# Patient Record
Sex: FEMALE | Race: WHITE | Employment: UNEMPLOYED | URBAN - METROPOLITAN AREA
[De-identification: names, ages, dates, MRNs, and addresses within clinical notes are randomized per-mention and may not be internally consistent; named-entity substitution may affect disease eponyms.]

---

## 2017-01-03 ENCOUNTER — RADIANT APPOINTMENT (OUTPATIENT)
Dept: MAMMOGRAPHY | Facility: CLINIC | Age: 57
End: 2017-01-03
Payer: COMMERCIAL

## 2017-01-03 DIAGNOSIS — Z12.31 VISIT FOR SCREENING MAMMOGRAM: ICD-10-CM

## 2017-01-03 PROCEDURE — G0202 SCR MAMMO BI INCL CAD: HCPCS | Mod: TC

## 2017-01-05 ENCOUNTER — OFFICE VISIT (OUTPATIENT)
Dept: OPTOMETRY | Facility: CLINIC | Age: 57
End: 2017-01-05
Payer: COMMERCIAL

## 2017-01-05 DIAGNOSIS — E11.9 TYPE 2 DIABETES MELLITUS WITHOUT RETINOPATHY (H): ICD-10-CM

## 2017-01-05 DIAGNOSIS — H52.203 HYPEROPIA WITH ASTIGMATISM AND PRESBYOPIA, BILATERAL: Primary | ICD-10-CM

## 2017-01-05 DIAGNOSIS — H52.03 HYPEROPIA WITH ASTIGMATISM AND PRESBYOPIA, BILATERAL: Primary | ICD-10-CM

## 2017-01-05 DIAGNOSIS — H52.4 HYPEROPIA WITH ASTIGMATISM AND PRESBYOPIA, BILATERAL: Primary | ICD-10-CM

## 2017-01-05 PROCEDURE — 92015 DETERMINE REFRACTIVE STATE: CPT | Performed by: OPTOMETRIST

## 2017-01-05 PROCEDURE — 92014 COMPRE OPH EXAM EST PT 1/>: CPT | Performed by: OPTOMETRIST

## 2017-01-05 ASSESSMENT — REFRACTION_WEARINGRX
OS_CYLINDER: +0.75
OS_ADD: +1.75
OD_ADD: +1.75
OS_AXIS: 118
SPECS_TYPE: BIFOCAL
OS_SPHERE: -0.50
OD_CYLINDER: +0.50
OD_SPHERE: PLANO
OD_AXIS: 035

## 2017-01-05 ASSESSMENT — VISUAL ACUITY
METHOD: SNELLEN - LINEAR
OD_SC+: +2
OS_CC: 20/30-3
OS_SC+: -2
OS_CC+: +1
OD_SC: 20/30
OS_CC: 20/25
OD_CC: 20/20
OD_CC: 20/30-2
OD_SC: 20/60
OS_SC: 20/120+1
OS_SC: 20/20
OD_CC+: -1

## 2017-01-05 ASSESSMENT — EXTERNAL EXAM - LEFT EYE: OS_EXAM: NORMAL

## 2017-01-05 ASSESSMENT — REFRACTION_MANIFEST
OS_CYLINDER: +1.00
OD_ADD: +2.00
OD_CYLINDER: +0.75
OS_SPHERE: -1.00
OS_CYLINDER: +1.50
OS_AXIS: 108
OD_SPHERE: -0.25
OD_AXIS: 064
OS_AXIS: 120
OD_SPHERE: PLANO
OS_SPHERE: -0.25
OD_CYLINDER: +1.00
OS_ADD: +2.00
METHOD_AUTOREFRACTION: 1
OD_AXIS: 60

## 2017-01-05 ASSESSMENT — TONOMETRY
IOP_METHOD: APPLANATION
OD_IOP_MMHG: 16
OS_IOP_MMHG: 16

## 2017-01-05 ASSESSMENT — SLIT LAMP EXAM - LIDS
COMMENTS: NORMAL
COMMENTS: NORMAL

## 2017-01-05 ASSESSMENT — CONF VISUAL FIELD
OS_NORMAL: 1
METHOD: COUNTING FINGERS
OD_NORMAL: 1

## 2017-01-05 ASSESSMENT — EXTERNAL EXAM - RIGHT EYE: OD_EXAM: NORMAL

## 2017-01-05 ASSESSMENT — CUP TO DISC RATIO
OD_RATIO: 0.2
OS_RATIO: 0.2

## 2017-01-05 NOTE — PROGRESS NOTES
Chief Complaint   Patient presents with     Eye Exam For Diabetes        A1C      6.3   12/21/2016  A1C      6.7   9/15/2016  A1C      7.5   6/9/2016  A1C      6.3   7/13/2015  A1C      6.1   4/22/2015    Last Eye Exam: 12/8/2015  Dilated Previously: Yes    What are you currently using to see?  glasses    Distance Vision Acuity: Satisfied with vision    Near Vision Acuity: Satisfied with vision while reading  with glasses    Eye Comfort: itchy - sometimes  Do you use eye drops? : Yes: Systane - 2x a month  Occupation or Hobbies: games on phone - [employer]    Kusum Hintonquist  [employer]     Medical, surgical and family histories reviewed and updated 1/5/2017.       OBJECTIVE: See Ophthalmology exam    ASSESSMENT:    ICD-10-CM    1. Hyperopia with astigmatism and presbyopia, bilateral H52.03 EYE EXAM (SIMPLE-NONBILLABLE)    H52.203 REFRACTION   2. Type 2 diabetes mellitus without retinopathy (H) E11.9 EYE EXAM (SIMPLE-NONBILLABLE)      PLAN:    Pily Roche aware  eye exam results will be sent to Kath Fierro.  Patient Instructions   There was no diabetic eye disease in either eye today. Keep blood sugar levels under good control and return yearly for eye exams.    Your eyes may be blurry at near and sensitive to light for several hours from the dilating drops.

## 2017-01-05 NOTE — PATIENT INSTRUCTIONS
There was no diabetic eye disease in either eye today. Keep blood sugar levels under good control and return yearly for eye exams.    Your eyes may be blurry at near and sensitive to light for several hours from the dilating drops.

## 2017-01-10 RX ORDER — METFORMIN HCL 500 MG
TABLET, EXTENDED RELEASE 24 HR ORAL
Qty: 360 TABLET | Refills: 0 | OUTPATIENT
Start: 2017-01-10

## 2017-01-10 NOTE — TELEPHONE ENCOUNTER
metFORMIN (GLUCOPHAGE-XR) 500 MG 24 hr tablet 360 tablet 1 12/21/2016  No      Sig: Take 4 tablets (2,000 mg) by mouth daily (with dinner)     RN called pharmacy and was informed patient has refills on file.    Martita Quintana RN

## 2017-01-30 RX ORDER — DICLOFENAC SODIUM 75 MG/1
TABLET, DELAYED RELEASE ORAL
Qty: 180 TABLET | Refills: 0 | OUTPATIENT
Start: 2017-01-30

## 2017-01-30 RX ORDER — SIMVASTATIN 40 MG
TABLET ORAL
Qty: 90 TABLET | Refills: 0 | OUTPATIENT
Start: 2017-01-30

## 2017-01-30 NOTE — TELEPHONE ENCOUNTER
Denied as too soon - patient should have refills available.  Last RX in EPIC shows it was sent to the same pharmacy (as requesting) with 'Receipt confirmed by pharmacy'.      Reza Kearney RN

## 2017-02-04 ENCOUNTER — TELEPHONE (OUTPATIENT)
Dept: FAMILY MEDICINE | Facility: CLINIC | Age: 57
End: 2017-02-04

## 2017-02-04 DIAGNOSIS — E11.9 TYPE 2 DIABETES MELLITUS WITHOUT RETINOPATHY (H): Primary | ICD-10-CM

## 2017-02-04 NOTE — TELEPHONE ENCOUNTER
The patient's insurance requires a new prescription. [employer] no longer covers Accu-chek Edna. Please fax us back for a new prescription approval for new diabetic suplies (nw meter, strips and lancets). Insurance covers One Touch Ultra.          Jordan Faarax  [facility]

## 2017-04-11 ENCOUNTER — TELEPHONE (OUTPATIENT)
Dept: FAMILY MEDICINE | Facility: CLINIC | Age: 57
End: 2017-04-11

## 2017-04-11 DIAGNOSIS — G89.29 CHRONIC SHOULDER PAIN, UNSPECIFIED LATERALITY: ICD-10-CM

## 2017-04-11 DIAGNOSIS — F33.1 MAJOR DEPRESSIVE DISORDER, RECURRENT EPISODE, MODERATE (H): ICD-10-CM

## 2017-04-11 DIAGNOSIS — F17.200 TOBACCO USE DISORDER: ICD-10-CM

## 2017-04-11 DIAGNOSIS — M25.519 CHRONIC SHOULDER PAIN, UNSPECIFIED LATERALITY: ICD-10-CM

## 2017-04-11 NOTE — LETTER
April 21, 2017          Pily Roche  [address] [facility] [address]            Dear Pily Roche,      At [facility] we care about your health and are committed to providing quality patient care. Regular appointments are a vital part of the care and management of your health and can help prevent many of the complications that can occur.      It has come to our attention that you are due for Diabetes check.  Please call [facility] at [phone] soon to schedule your follow up appointment.    If you have transferred care to another clinic please call to inform us so that we do not continue to send you reminder letters.      Sincerely,      [facility] Care Team/kwaku

## 2017-04-11 NOTE — TELEPHONE ENCOUNTER
Panel Management Review      Lab Results   Component Value Date    A1C 6.3 12/21/2016    A1C 6.7 09/15/2016       Fail List measure: Dm      Patient is due/failing the following:   A1C    Action needed:   Patient needs office visit for Diabetes check.    Type of outreach:    Phone, spoke to patient.   , left message to call and schd a Diabetes appt.      Questions for provider review:    None                                                                                                                                    Kaela Wolf MA

## 2017-04-11 NOTE — TELEPHONE ENCOUNTER
buPROPion (WELLBUTRIN SR) 150 MG 12 hr tablet       Last Written Prescription Date: 12/21/16  Last Fill Quantity: 180; # refills: 1  Last Office Visit with [facility], [facility] or  Health prescribing provider:  12/21/16        Last PHQ-9 score on record=   PHQ-9 SCORE 11/28/2016   Total Score -   Total Score 12       Lab Results   Component Value Date    AST 28 12/21/2016     Lab Results   Component Value Date    ALT 47 12/21/2016       gabapentin (NEURONTIN) 300 MG capsule      Last Written Prescription Date: 12/21/16  Last Quantity: 910, # refills: 1  Last Office Visit with [facility], [facility] or  Health prescribing provider: 12/21/16       Creatinine   Date Value Ref Range Status   12/21/2016 0.78 0.52 - 1.04 mg/dL Final     Lab Results   Component Value Date    AST 28 12/21/2016     Lab Results   Component Value Date    ALT 47 12/21/2016     BP Readings from Last 3 Encounters:   12/21/16 116/78   09/19/16 112/60   06/13/16 110/80     traZODone (DESYREL) 100 MG tablet       Last Written Prescription Date: 12/21/16  Last Fill Quantity: 90; # refills: 1  Last Office Visit with [facility], [facility] or  Health prescribing provider:  12/21/16        Last PHQ-9 score on record=   PHQ-9 SCORE 11/28/2016   Total Score -   Total Score 12       Lab Results   Component Value Date    AST 28 12/21/2016     Lab Results   Component Value Date    ALT 47 12/21/2016           Jordan Faarax  [facility]

## 2017-04-12 NOTE — TELEPHONE ENCOUNTER
Routing refill request to provider for review/approval because:  Drug not on the [facility] refill protocol - Gabapentin  PHQ9 > 4

## 2017-04-13 RX ORDER — GABAPENTIN 300 MG/1
CAPSULE ORAL
Qty: 810 CAPSULE | Refills: 0 | Status: SHIPPED | OUTPATIENT
Start: 2017-04-13 | End: 2017-07-07

## 2017-04-13 RX ORDER — BUPROPION HYDROCHLORIDE 150 MG/1
TABLET, EXTENDED RELEASE ORAL
Qty: 180 TABLET | Refills: 0 | Status: SHIPPED | OUTPATIENT
Start: 2017-04-13 | End: 2017-07-07

## 2017-04-13 RX ORDER — TRAZODONE HYDROCHLORIDE 100 MG/1
TABLET ORAL
Qty: 180 TABLET | Refills: 0 | Status: SHIPPED | OUTPATIENT
Start: 2017-04-13 | End: 2017-07-07

## 2017-06-18 DIAGNOSIS — M25.519 CHRONIC SHOULDER PAIN, UNSPECIFIED LATERALITY: ICD-10-CM

## 2017-06-18 DIAGNOSIS — G89.29 CHRONIC SHOULDER PAIN, UNSPECIFIED LATERALITY: ICD-10-CM

## 2017-06-18 NOTE — TELEPHONE ENCOUNTER
lidocaine (LIDODERM) 5 % Patch      Last Written Prescription Date: 12/21/16  Last Fill Quantity: 90, # refills: 1  Last Office Visit with [facility] or [facility] prescribing provider: 12/21/16       Creatinine   Date Value Ref Range Status   12/21/2016 0.78 0.52 - 1.04 mg/dL Final         Genevieve Goldberg  [facility]

## 2017-06-20 NOTE — TELEPHONE ENCOUNTER
Routing refill request to provider for review/approval because:  Drug not on the [facility] refill protocol     KATE Cramer, Clinical RN Larissa Young.

## 2017-06-21 DIAGNOSIS — F41.9 ANXIETY: Primary | ICD-10-CM

## 2017-06-21 RX ORDER — CLONAZEPAM 1 MG/1
TABLET ORAL
Refills: 0 | Status: SHIPPED
Start: 2017-06-21 | End: 2017-07-07

## 2017-06-21 RX ORDER — LIDOCAINE 50 MG/G
PATCH TOPICAL
Qty: 90 PATCH | Refills: 1 | Status: SHIPPED | OUTPATIENT
Start: 2017-06-21 | End: 2018-07-18

## 2017-06-21 NOTE — TELEPHONE ENCOUNTER
clonazePAM (KLONOPIN) 1 MG tablet (Discontinued)      Last Written Prescription Date:  12/21/16  Last Fill Quantity: 60,   # refills: 2  Last Office Visit with [facility], [facility] or [facility] prescribing provider: 12/21/16  Future Office visit:       Routing refill request to provider for review/approval because:  Drug not on the [facility], [facility] or [facility] refill protocol or controlled substance          Jordan Faarax  [facility]

## 2017-06-21 NOTE — TELEPHONE ENCOUNTER
I will not fill controlled substances for her as we discussed after her last urine drug screen.  She is due for a diabetes recheck, I suggest she schedule an appt soon for that.   Kath Fierro PA-C

## 2017-07-05 DIAGNOSIS — N18.2 TYPE 2 DIABETES MELLITUS WITH STAGE 2 CHRONIC KIDNEY DISEASE, WITHOUT LONG-TERM CURRENT USE OF INSULIN (H): Primary | ICD-10-CM

## 2017-07-05 DIAGNOSIS — E11.22 TYPE 2 DIABETES MELLITUS WITH STAGE 2 CHRONIC KIDNEY DISEASE, WITHOUT LONG-TERM CURRENT USE OF INSULIN (H): Primary | ICD-10-CM

## 2017-07-07 ENCOUNTER — OFFICE VISIT (OUTPATIENT)
Dept: FAMILY MEDICINE | Facility: CLINIC | Age: 57
End: 2017-07-07
Payer: COMMERCIAL

## 2017-07-07 VITALS
TEMPERATURE: 97.8 F | SYSTOLIC BLOOD PRESSURE: 120 MMHG | HEART RATE: 102 BPM | OXYGEN SATURATION: 94 % | HEIGHT: 64 IN | BODY MASS INDEX: 30.22 KG/M2 | DIASTOLIC BLOOD PRESSURE: 76 MMHG | WEIGHT: 177 LBS

## 2017-07-07 DIAGNOSIS — F17.200 TOBACCO USE DISORDER: ICD-10-CM

## 2017-07-07 DIAGNOSIS — F33.1 MAJOR DEPRESSIVE DISORDER, RECURRENT EPISODE, MODERATE (H): ICD-10-CM

## 2017-07-07 DIAGNOSIS — K21.9 LPRD (LARYNGOPHARYNGEAL REFLUX DISEASE): ICD-10-CM

## 2017-07-07 DIAGNOSIS — E78.5 HYPERLIPIDEMIA LDL GOAL <100: ICD-10-CM

## 2017-07-07 DIAGNOSIS — J44.89 CHRONIC BRONCHITIS WITH COPD (CHRONIC OBSTRUCTIVE PULMONARY DISEASE) (H): ICD-10-CM

## 2017-07-07 DIAGNOSIS — M25.519 CHRONIC SHOULDER PAIN, UNSPECIFIED LATERALITY: ICD-10-CM

## 2017-07-07 DIAGNOSIS — N18.2 TYPE 2 DIABETES MELLITUS WITH STAGE 2 CHRONIC KIDNEY DISEASE, WITHOUT LONG-TERM CURRENT USE OF INSULIN (H): ICD-10-CM

## 2017-07-07 DIAGNOSIS — G89.29 CHRONIC SHOULDER PAIN, UNSPECIFIED LATERALITY: ICD-10-CM

## 2017-07-07 DIAGNOSIS — E11.22 TYPE 2 DIABETES MELLITUS WITH STAGE 2 CHRONIC KIDNEY DISEASE, WITHOUT LONG-TERM CURRENT USE OF INSULIN (H): ICD-10-CM

## 2017-07-07 LAB
ALBUMIN SERPL-MCNC: 4.2 G/DL (ref 3.4–5)
ALP SERPL-CCNC: 116 U/L (ref 40–150)
ALT SERPL W P-5'-P-CCNC: 38 U/L (ref 0–50)
ANION GAP SERPL CALCULATED.3IONS-SCNC: 8 MMOL/L (ref 3–14)
AST SERPL W P-5'-P-CCNC: 23 U/L (ref 0–45)
BILIRUB SERPL-MCNC: 0.4 MG/DL (ref 0.2–1.3)
BUN SERPL-MCNC: 19 MG/DL (ref 7–30)
CALCIUM SERPL-MCNC: 9.4 MG/DL (ref 8.5–10.1)
CHLORIDE SERPL-SCNC: 107 MMOL/L (ref 94–109)
CHOLEST SERPL-MCNC: 162 MG/DL
CO2 SERPL-SCNC: 26 MMOL/L (ref 20–32)
CREAT SERPL-MCNC: 0.77 MG/DL (ref 0.52–1.04)
CREAT UR-MCNC: 105 MG/DL
GFR SERPL CREATININE-BSD FRML MDRD: 78 ML/MIN/1.7M2
GLUCOSE SERPL-MCNC: 105 MG/DL (ref 70–99)
HBA1C MFR BLD: 6.3 % (ref 4.3–6)
HDLC SERPL-MCNC: 50 MG/DL
LDLC SERPL CALC-MCNC: 91 MG/DL
MICROALBUMIN UR-MCNC: 12 MG/L
MICROALBUMIN/CREAT UR: 11.24 MG/G CR (ref 0–25)
NONHDLC SERPL-MCNC: 112 MG/DL
POTASSIUM SERPL-SCNC: 4.1 MMOL/L (ref 3.4–5.3)
PROT SERPL-MCNC: 7.5 G/DL (ref 6.8–8.8)
SODIUM SERPL-SCNC: 141 MMOL/L (ref 133–144)
TRIGL SERPL-MCNC: 106 MG/DL

## 2017-07-07 PROCEDURE — 82043 UR ALBUMIN QUANTITATIVE: CPT | Performed by: PHYSICIAN ASSISTANT

## 2017-07-07 PROCEDURE — 80053 COMPREHEN METABOLIC PANEL: CPT | Performed by: PHYSICIAN ASSISTANT

## 2017-07-07 PROCEDURE — 36415 COLL VENOUS BLD VENIPUNCTURE: CPT | Performed by: PHYSICIAN ASSISTANT

## 2017-07-07 PROCEDURE — 99214 OFFICE O/P EST MOD 30 MIN: CPT | Performed by: PHYSICIAN ASSISTANT

## 2017-07-07 PROCEDURE — 80061 LIPID PANEL: CPT | Performed by: PHYSICIAN ASSISTANT

## 2017-07-07 PROCEDURE — 99207 C FOOT EXAM  NO CHARGE: CPT | Performed by: PHYSICIAN ASSISTANT

## 2017-07-07 PROCEDURE — 83036 HEMOGLOBIN GLYCOSYLATED A1C: CPT | Performed by: PHYSICIAN ASSISTANT

## 2017-07-07 RX ORDER — METFORMIN HCL 500 MG
2000 TABLET, EXTENDED RELEASE 24 HR ORAL
Qty: 360 TABLET | Refills: 1 | Status: SHIPPED | OUTPATIENT
Start: 2017-07-07 | End: 2017-12-04

## 2017-07-07 RX ORDER — TRAZODONE HYDROCHLORIDE 100 MG/1
200 TABLET ORAL AT BEDTIME
Qty: 180 TABLET | Refills: 1 | Status: SHIPPED | OUTPATIENT
Start: 2017-07-07 | End: 2017-12-04

## 2017-07-07 RX ORDER — LORATADINE 10 MG/1
10 TABLET ORAL DAILY
Qty: 90 TABLET | Refills: 1 | Status: SHIPPED | OUTPATIENT
Start: 2017-07-07 | End: 2017-12-04

## 2017-07-07 RX ORDER — METHOCARBAMOL 500 MG/1
1000 TABLET, FILM COATED ORAL 3 TIMES DAILY PRN
Qty: 270 TABLET | Refills: 1 | Status: SHIPPED | OUTPATIENT
Start: 2017-07-07 | End: 2017-12-04

## 2017-07-07 RX ORDER — ALBUTEROL SULFATE 90 UG/1
AEROSOL, METERED RESPIRATORY (INHALATION)
Qty: 36 G | Refills: 1 | Status: SHIPPED | OUTPATIENT
Start: 2017-07-07 | End: 2017-10-25

## 2017-07-07 RX ORDER — BUPROPION HYDROCHLORIDE 150 MG/1
150 TABLET, EXTENDED RELEASE ORAL 2 TIMES DAILY
Qty: 180 TABLET | Refills: 1 | Status: SHIPPED | OUTPATIENT
Start: 2017-07-07 | End: 2017-12-04

## 2017-07-07 RX ORDER — GABAPENTIN 300 MG/1
CAPSULE ORAL
Qty: 810 CAPSULE | Refills: 1 | Status: SHIPPED | OUTPATIENT
Start: 2017-07-07 | End: 2017-12-04

## 2017-07-07 RX ORDER — SIMVASTATIN 40 MG
40 TABLET ORAL AT BEDTIME
Qty: 90 TABLET | Refills: 1 | Status: SHIPPED | OUTPATIENT
Start: 2017-07-07 | End: 2017-12-04

## 2017-07-07 RX ORDER — FLUTICASONE PROPIONATE 50 MCG
2 SPRAY, SUSPENSION (ML) NASAL DAILY
Qty: 48 G | Refills: 1 | Status: SHIPPED | OUTPATIENT
Start: 2017-07-07 | End: 2017-12-04

## 2017-07-07 RX ORDER — DICLOFENAC SODIUM 75 MG/1
TABLET, DELAYED RELEASE ORAL
Qty: 180 TABLET | Refills: 1 | Status: SHIPPED | OUTPATIENT
Start: 2017-07-07 | End: 2017-12-04

## 2017-07-07 ASSESSMENT — PAIN SCALES - GENERAL: PAINLEVEL: NO PAIN (0)

## 2017-07-07 NOTE — NURSING NOTE
"Chief Complaint   Patient presents with     Diabetes       Initial /76  Pulse 102  Temp 97.8  F (36.6  C) (Tympanic)  Ht 5' 4\" (1.626 m)  Wt 177 lb (80.3 kg)  SpO2 94%  Breastfeeding? No  BMI 30.38 kg/m2 Estimated body mass index is 30.38 kg/(m^2) as calculated from the following:    Height as of this encounter: 5' 4\" (1.626 m).    Weight as of this encounter: 177 lb (80.3 kg).  Medication Reconciliation: complete   Deandre APONTE        "

## 2017-07-07 NOTE — MR AVS SNAPSHOT
After Visit Summary   7/7/2017    Pily Roche    MRN: [id number]           Patient Information     Date Of Birth          1960        Visit Information        Provider Department      7/7/2017 7:00 AM Kath Fierro PA-C [facility]        Today's Diagnoses     Type 2 diabetes mellitus with stage 2 chronic kidney disease, without long-term current use of insulin (H)        LPRD (laryngopharyngeal reflux disease)        Chronic shoulder pain, unspecified laterality        Major depressive disorder, recurrent episode, moderate (H)        Chronic bronchitis with COPD (chronic obstructive pulmonary disease)        Hyperlipidemia LDL goal <100        Tobacco use disorder          Care Instructions    At [facility], we strive to deliver an exceptional experience to you, every time we see you.    If you receive a survey in the mail, please send us back your thoughts. We really do value your feedback.    Thank you for visiting [facility]    Normal or non-critical lab and imaging results will be communicated to you by MyChart, letter or phone within 7 days.  If you do not hear from us within 10 days, please call the clinic. If you have a critical or abnormal lab result, we will notify you by phone as soon as possible.     If you have any questions regarding your visit please contact:     Team Zeynep/Spirit  Clinic Hours Telephone Number   Dr. Daniel Varela   7am-7pm  Monday through Thursday  7am-5pm Friday [phone]  Lizzy CHAVEZ RN   Pharmacy 8:30am-9pm Monday-Friday    9am-5pm Saturday-Sunday [phone]   Urgent Care 11am-9pm Monday-Friday        9am-5pm Saturday-Sunday [phone]     After hours, weekend or if you need to make an appointment with your primary provider please call [phone].   After Hours nurse advise:  call [facility]: [phone]    Use EyeLockhart (secure email communication and access to your chart) to send your primary care provider a message or make an appointment. Ask someone on your Team how to sign up for AVST. To log on to [employer] or for more information in [employer] please visit the website at [email or web address].   As of October 8, 2013, all password changes, disabled accounts, or ID changes in AVST/MyHealth will be done by our Access Services Department.   If you need help with your account or password, call: [phone]. Clinic staff no longer has the ability to change passwords.             Follow-ups after your visit        Your next 10 appointments already scheduled     Jul 12, 2017  7:20 AM CDT   Office Visit with Kath Fierro PA-C   [facility]    [address] [place] [id number] [phone]           Bring a current list of meds and any records pertaining to this visit.  For Physicals, please bring immunization records and any forms needing to be filled out.  Please arrive 10 minutes early to complete paperwork.              Who to contact     If you have questions or need follow up information about today's clinic visit or your schedule please contact [facility] directly at [phone].  Normal or non-critical lab and imaging results will be communicated to you by EyeLockhart, letter or phone within 4 business days after the clinic has received the results. If you do not hear from us within 7 days, please contact the clinic through EyeLockhart or phone. If you have a critical or abnormal lab result, we will notify you by phone as soon as possible.  Submit refill requests through AVST or call your pharmacy and they will forward the refill request to us. Please allow 3 business days for your refill to be completed.          Additional Information About Your Visit        [facility]  "Information     Planwise lets you send messages to your doctor, view your test results, renew your prescriptions, schedule appointments and more. To sign up, go to [email or web address] . Click on \"Log in\" on the left side of the screen, which will take you to the Welcome page. Then click on \"Sign up Now\" on the right side of the page.     You will be asked to enter the access code listed below, as well as some personal information. Please follow the directions to create your username and password.     Your access code is: [email or web address]  Expires: 10/5/2017  7:28 AM     Your access code will  in 90 days. If you need help or a new code, please call your [place] clinic or [phone].        Care EveryWhere ID     This is your Care EveryWhere ID. This could be used by other organizations to access your [place] medical records  [id number]        Your Vitals Were     Pulse Temperature Height Pulse Oximetry Breastfeeding? BMI (Body Mass Index)    102 97.8  F (36.6  C) (Tympanic) 5' 4\" (1.626 m) 94% No 30.38 kg/m2       Blood Pressure from Last 3 Encounters:   17 120/76   16 116/78   16 112/60    Weight from Last 3 Encounters:   17 177 lb (80.3 kg)   16 194 lb (88 kg)   16 203 lb (92.1 kg)              We Performed the Following     Albumin Random Urine Quantitative     Comprehensive metabolic panel     FOOT EXAM     Hemoglobin A1c     Lipid panel reflex to direct LDL          Today's Medication Changes          These changes are accurate as of: 17  9:36 AM.  If you have any questions, ask your nurse or doctor.               These medicines have changed or have updated prescriptions.        Dose/Directions    buPROPion 150 MG 12 hr tablet   Commonly known as:  WELLBUTRIN SR   This may have changed:  Another medication with the same name was removed. Continue taking this medication, and follow the directions you see here.   Used for:  Tobacco use disorder, Major depressive " disorder, recurrent episode, moderate (H)   Changed by:  Kath Fierro PA-C        Dose:  150 mg   Take 1 tablet (150 mg) by mouth 2 times daily   Quantity:  180 tablet   Refills:  1       gabapentin 300 MG capsule   Commonly known as:  NEURONTIN   This may have changed:  Another medication with the same name was removed. Continue taking this medication, and follow the directions you see here.   Used for:  Chronic shoulder pain, unspecified laterality   Changed by:  Kath Fierro PA-C        TAKE 3 CAPSULES BY MOUTH THREE TIMES DAILY   Quantity:  810 capsule   Refills:  1       lidocaine 5 % Patch   Commonly known as:  LIDODERM   This may have changed:  Another medication with the same name was removed. Continue taking this medication, and follow the directions you see here.   Used for:  Chronic shoulder pain, unspecified laterality   Changed by:  Kath Fierro PA-C        APPLY 1 PATCH ONTO SKIN. WEAR FOR 12 HOURS THEN PATCH FREE FOR 12 HOURS   Quantity:  90 patch   Refills:  1       traZODone 100 MG tablet   Commonly known as:  DESYREL   This may have changed:  Another medication with the same name was removed. Continue taking this medication, and follow the directions you see here.   Used for:  Major depressive disorder, recurrent episode, moderate (H)   Changed by:  Kath Fierro PA-C        Dose:  200 mg   Take 2 tablets (200 mg) by mouth At Bedtime   Quantity:  180 tablet   Refills:  1            Where to get your medicines      These medications were sent to [facility] [address] [facility], [place] - [address] [facility] [address]  [address] [place] [facility]  [address] [facility] [address]     Phone:  [phone]     albuterol 108 (90 BASE) MCG/ACT Inhaler    aspirin 81 MG tablet    buPROPion 150 MG 12 hr tablet    diclofenac 75 MG EC tablet    fluticasone 50 MCG/ACT spray    gabapentin 300 MG capsule    loratadine 10 MG tablet    metFORMIN 500 MG 24 hr  tablet    methocarbamol 500 MG tablet    mometasone-formoterol 200-5 MCG/ACT oral inhaler    omeprazole 20 MG CR capsule    simvastatin 40 MG tablet    traZODone 100 MG tablet                Primary Care Provider Office Phone # Fax #    Kath Marjan Fierro PA-C [phone]       [facility] [address]  [facility] [address]        Equal Access to Services     KARISSA CARDENAS : Hadii aad ku hadasho Soomaali, waaxda luqadaha, qaybta kaalmada adeegyada, waxay idiin hayaan adeeg kharash lanataliia . So [facility] [phone].    ATENCIÓN: Si habla español, tiene a street disposición servicios gratuitos de asistencia lingüística. Yfname al [phone].    We comply with applicable federal civil rights laws and [place] laws. We do not discriminate on the basis of race, color, national origin, age, disability sex, sexual orientation or gender identity.            Thank you!     Thank you for choosing [facility]  for your care. Our goal is always to provide you with excellent care. Hearing back from our patients is one way we can continue to improve our services. Please take a few minutes to complete the written survey that you may receive in the mail after your visit with us. Thank you!             Your Updated Medication List - Protect others around you: Learn how to safely use, store and throw away your medicines at www.disposemymeds.org.          This list is accurate as of: 7/7/17  9:36 AM.  Always use your most recent med list.                   Brand Name Dispense Instructions for use Diagnosis    albuterol 108 (90 BASE) MCG/ACT Inhaler    VENTOLIN HFA    36 g    INHALE 2 PUFFS EVERY 6 HOURS AS NEEDED FOR SHORTNESS OF BREATH OR DYSPNEA    Chronic bronchitis with COPD (chronic obstructive pulmonary disease) (H)       aspirin 81 MG tablet     90 tablet    Take 1 tablet (81 mg) by mouth At Bedtime    Type 2 diabetes mellitus with stage 2 chronic kidney disease, without long-term  current use of insulin (H)       buPROPion 150 MG 12 hr tablet    WELLBUTRIN SR    180 tablet    Take 1 tablet (150 mg) by mouth 2 times daily    Tobacco use disorder, Major depressive disorder, recurrent episode, moderate (H)       diclofenac 75 MG EC tablet    VOLTAREN    180 tablet    TAKE 1 TABLET BY MOUTH TWICE DAILY AS NEEDED FOR MODERATE PAIN.    Chronic shoulder pain, unspecified laterality       fluticasone 50 MCG/ACT spray    FLONASE    48 g    Spray 2 sprays into both nostrils daily    LPRD (laryngopharyngeal reflux disease)       gabapentin 300 MG capsule    NEURONTIN    810 capsule    TAKE 3 CAPSULES BY MOUTH THREE TIMES DAILY    Chronic shoulder pain, unspecified laterality       lidocaine 5 % Patch    LIDODERM    90 patch    APPLY 1 PATCH ONTO SKIN. WEAR FOR 12 HOURS THEN PATCH FREE FOR 12 HOURS    Chronic shoulder pain, unspecified laterality       loratadine 10 MG tablet    CLARITIN    90 tablet    Take 1 tablet (10 mg) by mouth daily    LPRD (laryngopharyngeal reflux disease)       metFORMIN 500 MG 24 hr tablet    GLUCOPHAGE-XR    360 tablet    Take 4 tablets (2,000 mg) by mouth daily (with dinner)    Type 2 diabetes mellitus with stage 2 chronic kidney disease, without long-term current use of insulin (H)       methocarbamol 500 MG tablet    ROBAXIN    270 tablet    Take 2 tablets (1,000 mg) by mouth 3 times daily as needed for muscle spasms    Chronic shoulder pain, unspecified laterality       mometasone-formoterol 200-5 MCG/ACT oral inhaler    DULERA    3 Inhaler    Inhale 2 puffs into the lungs 2 times daily    Chronic bronchitis with COPD (chronic obstructive pulmonary disease) (H)       omeprazole 20 MG CR capsule    priLOSEC    90 capsule    Take 1 capsule (20 mg) by mouth daily as needed    LPRD (laryngopharyngeal reflux disease)       * order for DME     1 Device    1 glucometer per insurance formulary    Type 2 diabetes mellitus without retinopathy (H)       * order for DME     3 Month     Diabetic test strips and lancets per insurance formulary Check blood sugar once per day    Type 2 diabetes mellitus without retinopathy (H)       simvastatin 40 MG tablet    ZOCOR    90 tablet    Take 1 tablet (40 mg) by mouth At Bedtime    Hyperlipidemia LDL goal <100       traZODone 100 MG tablet    DESYREL    180 tablet    Take 2 tablets (200 mg) by mouth At Bedtime    Major depressive disorder, recurrent episode, moderate (H)       * Notice:  This list has 2 medication(s) that are the same as other medications prescribed for you. Read the directions carefully, and ask your doctor or other care provider to review them with you.

## 2017-07-07 NOTE — PROGRESS NOTES
SUBJECTIVE:                                                    Pily Roche is a 56 year old female who presents to clinic today for the following health issues:      Diabetes Follow-up      Patient is checking blood sugars: rarely.  Results range from 122 to 100    Diabetic concerns: None     Symptoms of hypoglycemia (low blood sugar): none     Paresthesias (numbness or burning in feet) or sores: No     Date of last diabetic eye exam: UTD      Amount of exercise or physical activity: work and gardening    Problems taking medications regularly: No    Medication side effects: none    Diet: diabetic    GERD:  Stable on meds.     Chronic pain:  Stable on meds, not interested in pain clinic.  She has failed previous UDS therefore no narcotic or opioids recommended.   She is self-medicating with marijuana.    COPD:  Stable.  Still smoking.     Lipids:  Tolerating statin without issues.     Depression:  Stable.     Alcohol Use:  She is no longer drinking every night, now it is more like once every few weeks.      Skin changes>  Would like bumps removed (has had similar bumps removed in past)    Problem list and histories reviewed & adjusted, as indicated.  Additional history: as documented    Patient Active Problem List   Diagnosis     Insomnia     Chronic low back pain     Elevated liver enzymes     Moderate major depression (H)     Disorder of bursae and tendons in shoulder region     Alcohol abuse     Chronic bronchitis with COPD (chronic obstructive pulmonary disease) (H)     Advance care planning     Type 2 diabetes, HbA1c goal < 7% (H)     Hyperlipidemia LDL goal <100     Impingement syndrome, shoulder     Rotator cuff tear     AC separation     Tobacco use disorder     Anxiety     Chronic shoulder pain, unspecified laterality     CKD (chronic kidney disease) stage 2, GFR 60-89 ml/min     Macular degeneration     Type II diabetes mellitus with stage 2 chronic kidney disease (H)     Non morbid obesity due to  excess calories     Menopausal symptoms     Chronic bilateral low back pain without sciatica     Chronic pain syndrome     Methamphetamine use     Type 2 diabetes mellitus without retinopathy (H)     Past Surgical History:   Procedure Laterality Date     C SHOULDER SURG PROC UNLISTED  1992    X 3 (broken clavicle and rotator cuff tear with impingement     HC SACROPLASTY  1990's    Herniated L4-L5 X 2     HYSTERECTOMY, PAP NO LONGER INDICATED  2005     HYSTERECTOMY, VAGINAL  6/28/05    Ovaries in Place       Social History   Substance Use Topics     Smoking status: Current Every Day Smoker     Packs/day: 1.50     Years: 30.00     Types: Cigarettes     Smokeless tobacco: Never Used      Comment: 1/2 pack to 1 ppd, has an ecig     Alcohol use 3.0 - 6.0 oz/week     5 - 10 Standard drinks or equivalent per week      Comment: 2beers every few fews     Family History   Problem Relation Age of Onset     DIABETES Mother      Hypertension Mother      Gynecology Mother      Arthritis Mother      Thyroid Disease Mother      Allergies Mother      Lipids Father      HEART DISEASE Father      C.A.D. Father      Hypertension Maternal Grandmother      Arthritis Maternal Grandmother      CANCER Maternal Grandmother      CANCER Maternal Grandfather      C.A.D. Maternal Grandfather      Asthma Paternal Grandmother      C.A.D. Paternal Grandmother      CEREBROVASCULAR DISEASE Paternal Grandfather      Alzheimer Disease Paternal Grandfather      Arthritis Paternal Grandfather      C.A.D. Paternal Grandfather      Cardiovascular Paternal Grandfather      Circulatory Paternal Grandfather      Glaucoma No family hx of      Macular Degeneration No family hx of          Current Outpatient Prescriptions   Medication Sig Dispense Refill     loratadine (CLARITIN) 10 MG tablet Take 1 tablet (10 mg) by mouth daily 90 tablet 1     omeprazole (PRILOSEC) 20 MG CR capsule Take 1 capsule (20 mg) by mouth daily as needed 90 capsule 1     methocarbamol  (ROBAXIN) 500 MG tablet Take 2 tablets (1,000 mg) by mouth 3 times daily as needed for muscle spasms 270 tablet 1     traZODone (DESYREL) 100 MG tablet Take 2 tablets (200 mg) by mouth At Bedtime 180 tablet 1     gabapentin (NEURONTIN) 300 MG capsule TAKE 3 CAPSULES BY MOUTH THREE TIMES DAILY 810 capsule 1     mometasone-formoterol (DULERA) 200-5 MCG/ACT oral inhaler Inhale 2 puffs into the lungs 2 times daily 3 Inhaler 1     simvastatin (ZOCOR) 40 MG tablet Take 1 tablet (40 mg) by mouth At Bedtime 90 tablet 1     metFORMIN (GLUCOPHAGE-XR) 500 MG 24 hr tablet Take 4 tablets (2,000 mg) by mouth daily (with dinner) 360 tablet 1     diclofenac (VOLTAREN) 75 MG EC tablet TAKE 1 TABLET BY MOUTH TWICE DAILY AS NEEDED FOR MODERATE PAIN. 180 tablet 1     buPROPion (WELLBUTRIN SR) 150 MG 12 hr tablet Take 1 tablet (150 mg) by mouth 2 times daily 180 tablet 1     albuterol (VENTOLIN HFA) 108 (90 BASE) MCG/ACT Inhaler INHALE 2 PUFFS EVERY 6 HOURS AS NEEDED FOR SHORTNESS OF BREATH OR DYSPNEA 36 g 1     fluticasone (FLONASE) 50 MCG/ACT spray Spray 2 sprays into both nostrils daily 48 g 1     aspirin 81 MG tablet Take 1 tablet (81 mg) by mouth At Bedtime 90 tablet 3     lidocaine (LIDODERM) 5 % Patch APPLY 1 PATCH ONTO SKIN. WEAR FOR 12 HOURS THEN PATCH FREE FOR 12 HOURS 90 patch 1     [DISCONTINUED] buPROPion (WELLBUTRIN SR) 150 MG 12 hr tablet TAKE 1 TABLET BY MOUTH TWICE DAILY 180 tablet 0     [DISCONTINUED] gabapentin (NEURONTIN) 300 MG capsule TAKE 3 CAPSULES BY MOUTH THREE TIMES DAILY 810 capsule 0     [DISCONTINUED] traZODone (DESYREL) 100 MG tablet TAKE 2 TABLETS BY MOUTH AT BEDTIME 180 tablet 0     order for DME 1 glucometer per insurance formulary 1 Device 0     order for DME Diabetic test strips and lancets per insurance formulary Check blood sugar once per day 3 Month 1     [DISCONTINUED] loratadine (CLARITIN) 10 MG tablet Take 1 tablet (10 mg) by mouth daily 90 tablet 1     [DISCONTINUED] omeprazole (PRILOSEC) 20 MG  CR capsule Take 1 capsule (20 mg) by mouth daily as needed 90 capsule 1     [DISCONTINUED] traZODone (DESYREL) 100 MG tablet Take 2 tablets (200 mg) by mouth At Bedtime 180 tablet 1     [DISCONTINUED] gabapentin (NEURONTIN) 300 MG capsule TAKE 3 CAPSULES BY MOUTH THREE TIMES DAILY 810 capsule 1     [DISCONTINUED] mometasone-formoterol (DULERA) 200-5 MCG/ACT oral inhaler Inhale 2 puffs into the lungs 2 times daily 3 Inhaler 1     [DISCONTINUED] simvastatin (ZOCOR) 40 MG tablet Take 1 tablet (40 mg) by mouth At Bedtime 90 tablet 1     [DISCONTINUED] metFORMIN (GLUCOPHAGE-XR) 500 MG 24 hr tablet Take 4 tablets (2,000 mg) by mouth daily (with dinner) 360 tablet 1     [DISCONTINUED] buPROPion (WELLBUTRIN SR) 150 MG 12 hr tablet Take 1 tablet (150 mg) by mouth 2 times daily 180 tablet 1     [DISCONTINUED] albuterol (VENTOLIN HFA) 108 (90 BASE) MCG/ACT Inhaler INHALE 2 PUFFS EVERY 6 HOURS AS NEEDED FOR SHORTNESS OF BREATH OR DYSPNEA 36 g 1     [DISCONTINUED] lidocaine (LIDODERM) 5 % Patch Place 1 patch onto the skin every 24 hours Apply 1 patch to skin. Wear for 12 hours and remove for 12 hrs 90 patch 1     [DISCONTINUED] diclofenac (VOLTAREN) 75 MG EC tablet TAKE 1 TABLET BY MOUTH TWICE DAILY AS NEEDED FOR MODERATE PAIN. 180 tablet 1     [DISCONTINUED] fluticasone (FLONASE) 50 MCG/ACT spray Spray 2 sprays into both nostrils daily 48 g 1     BP Readings from Last 3 Encounters:   07/07/17 120/76   12/21/16 116/78   09/19/16 112/60    Wt Readings from Last 3 Encounters:   07/07/17 177 lb (80.3 kg)   12/21/16 194 lb (88 kg)   09/19/16 203 lb (92.1 kg)                    Reviewed and updated as needed this visit by clinical staff  Tobacco  Allergies  Meds       Reviewed and updated as needed this visit by Provider         ROS:  Constitutional, HEENT, cardiovascular, pulmonary, GI, , musculoskeletal, neuro, skin, endocrine and psych systems are negative, except as otherwise noted.    OBJECTIVE:     /76  Pulse 102   "Temp 97.8  F (36.6  C) (Tympanic)  Ht 5' 4\" (1.626 m)  Wt 177 lb (80.3 kg)  SpO2 94%  Breastfeeding? No  BMI 30.38 kg/m2  Body mass index is 30.38 kg/(m^2).  GENERAL: healthy, alert and no distress  EYES: Eyes grossly normal to inspection, PERRL and conjunctivae and sclerae normal  HENT: ear canals and TM's normal, nose and mouth without ulcers or lesions  NECK: no adenopathy, no asymmetry, masses, or scars and thyroid normal to palpation  RESP: lungs clear to auscultation - no rales, rhonchi or wheezes  BREAST: normal without masses, tenderness or nipple discharge and no palpable axillary masses or adenopathy  CV: regular rate and rhythm, normal S1 S2, no S3 or S4, no murmur, click or rub, no peripheral edema and peripheral pulses strong  ABDOMEN: soft, nontender, no hepatosplenomegaly, no masses and bowel sounds normal  MS: no gross musculoskeletal defects noted, no edema  SKIN: no suspicious lesions or rashes a few scattered erythematous papules noted on abdomen/arms (approximately 4-5).   NEURO: Normal strength and tone, mentation intact and speech normal  PSYCH: mentation appears normal, affect normal/bright  Diabetic foot exam: normal DP and PT pulses, no trophic changes or ulcerative lesions and normal sensory exam    Diagnostic Test Results:  Results for orders placed or performed in visit on 07/07/17 (from the past 24 hour(s))   Hemoglobin A1c   Result Value Ref Range    Hemoglobin A1C 6.3 (H) 4.3 - 6.0 %       ASSESSMENT/PLAN:             1. Type 2 diabetes mellitus with stage 2 chronic kidney disease, without long-term current use of insulin (H)  A1C stable.  Foot exam normal.  Ok to monitor sugars once every week or so given how stable sugars have been.      We did not discuss today, but may consider ACE-I at next visit, I do not see any CI to starting this.    - Hemoglobin A1c  - Lipid panel reflex to direct LDL  - Comprehensive metabolic panel  - Albumin Random Urine Quantitative  - metFORMIN " (GLUCOPHAGE-XR) 500 MG 24 hr tablet; Take 4 tablets (2,000 mg) by mouth daily (with dinner)  Dispense: 360 tablet; Refill: 1  - aspirin 81 MG tablet; Take 1 tablet (81 mg) by mouth At Bedtime  Dispense: 90 tablet; Refill: 3  - FOOT EXAM    2. LPRD (laryngopharyngeal reflux disease)  Stable.   - loratadine (CLARITIN) 10 MG tablet; Take 1 tablet (10 mg) by mouth daily  Dispense: 90 tablet; Refill: 1  - omeprazole (PRILOSEC) 20 MG CR capsule; Take 1 capsule (20 mg) by mouth daily as needed  Dispense: 90 capsule; Refill: 1  - fluticasone (FLONASE) 50 MCG/ACT spray; Spray 2 sprays into both nostrils daily  Dispense: 48 g; Refill: 1    3. Chronic shoulder pain, unspecified laterality  Stable.   I do not recommend self medicating with marijuana, I suggest she see a pain specialist however she is not interested.  I do not recommend opioids or narcotic medications given her previous methamphetamine use and current marijuana use.  Also need to monitor alcohol use.   - methocarbamol (ROBAXIN) 500 MG tablet; Take 2 tablets (1,000 mg) by mouth 3 times daily as needed for muscle spasms  Dispense: 270 tablet; Refill: 1  - gabapentin (NEURONTIN) 300 MG capsule; TAKE 3 CAPSULES BY MOUTH THREE TIMES DAILY  Dispense: 810 capsule; Refill: 1  - diclofenac (VOLTAREN) 75 MG EC tablet; TAKE 1 TABLET BY MOUTH TWICE DAILY AS NEEDED FOR MODERATE PAIN.  Dispense: 180 tablet; Refill: 1    4. Major depressive disorder, recurrent episode, moderate (H)  Stable.   - traZODone (DESYREL) 100 MG tablet; Take 2 tablets (200 mg) by mouth At Bedtime  Dispense: 180 tablet; Refill: 1  - buPROPion (WELLBUTRIN SR) 150 MG 12 hr tablet; Take 1 tablet (150 mg) by mouth 2 times daily  Dispense: 180 tablet; Refill: 1    5. Chronic bronchitis with COPD (chronic obstructive pulmonary disease)  Lungs clear today.  Smoking cessation advised.   - mometasone-formoterol (DULERA) 200-5 MCG/ACT oral inhaler; Inhale 2 puffs into the lungs 2 times daily  Dispense: 3  Inhaler; Refill: 1  - albuterol (VENTOLIN HFA) 108 (90 BASE) MCG/ACT Inhaler; INHALE 2 PUFFS EVERY 6 HOURS AS NEEDED FOR SHORTNESS OF BREATH OR DYSPNEA  Dispense: 36 g; Refill: 1    6. Hyperlipidemia LDL goal <100  Tolerating well.   - simvastatin (ZOCOR) 40 MG tablet; Take 1 tablet (40 mg) by mouth At Bedtime  Dispense: 90 tablet; Refill: 1    7. Tobacco use disorder  Will continue to strive towards smoking cessation.   - buPROPion (WELLBUTRIN SR) 150 MG 12 hr tablet; Take 1 tablet (150 mg) by mouth 2 times daily  Dispense: 180 tablet; Refill: 1    8.  Skin Lesions.  Ok to f/u for removal of these (will do a punch biopsy to remove)  FUTURE APPOINTMENTS:       - Follow-up visit in 6 months.       Kath Fierro PA-C  [facility]

## 2017-07-07 NOTE — PATIENT INSTRUCTIONS
At [facility], we strive to deliver an exceptional experience to you, every time we see you.    If you receive a survey in the mail, please send us back your thoughts. We really do value your feedback.    Thank you for visiting [facility]    Normal or non-critical lab and imaging results will be communicated to you by MyChart, letter or phone within 7 days.  If you do not hear from us within 10 days, please call the clinic. If you have a critical or abnormal lab result, we will notify you by phone as soon as possible.     If you have any questions regarding your visit please contact:     Team [facility]  Clinic Hours Telephone Number   Dr. Daniel Varela   7am-7pm  Monday through Thursday  7am-5pm Friday [phone]  Lizzy CHAVEZ RN   Pharmacy 8:30am-9pm Monday-Friday    9am-5pm Saturday-Sunday [phone]   Urgent Care 11am-9pm Monday-Friday        9am-5pm Saturday-Sunday [phone]     After hours, weekend or if you need to make an appointment with your primary provider please call [phone].   After Hours nurse advise: call [place] [facility]: [phone]    Use [employer] (secure email communication and access to your chart) to send your primary care provider a message or make an appointment. Ask someone on your Team how to sign up for [employer]. To log on to [employer] or for more information in [employer] please visit the website at [email or web address].   As of October 8, 2013, all password changes, disabled accounts, or ID changes in [employer]/MyHealth will be done by our Access Services Department.   If you need help with your account or password, call: [phone]. Clinic staff no longer has the ability to change passwords.

## 2017-07-08 ASSESSMENT — PATIENT HEALTH QUESTIONNAIRE - PHQ9: SUM OF ALL RESPONSES TO PHQ QUESTIONS 1-9: 11

## 2017-07-12 ENCOUNTER — OFFICE VISIT (OUTPATIENT)
Dept: FAMILY MEDICINE | Facility: CLINIC | Age: 57
End: 2017-07-12
Payer: COMMERCIAL

## 2017-07-12 VITALS
BODY MASS INDEX: 30.73 KG/M2 | HEART RATE: 105 BPM | OXYGEN SATURATION: 95 % | SYSTOLIC BLOOD PRESSURE: 128 MMHG | WEIGHT: 179 LBS | DIASTOLIC BLOOD PRESSURE: 84 MMHG | TEMPERATURE: 96.5 F

## 2017-07-12 DIAGNOSIS — R32 URINARY INCONTINENCE, UNSPECIFIED TYPE: ICD-10-CM

## 2017-07-12 DIAGNOSIS — D23.5 BENIGN NEOPLASM OF SKIN OF TRUNK, EXCEPT SCROTUM: Primary | ICD-10-CM

## 2017-07-12 PROCEDURE — 99207 ZZC NO CHARGE LOS: CPT | Performed by: PHYSICIAN ASSISTANT

## 2017-07-12 PROCEDURE — 11400 EXC TR-EXT B9+MARG 0.5 CM<: CPT | Performed by: PHYSICIAN ASSISTANT

## 2017-07-12 PROCEDURE — 11400 EXC TR-EXT B9+MARG 0.5 CM<: CPT | Mod: 59 | Performed by: PHYSICIAN ASSISTANT

## 2017-07-12 NOTE — PATIENT INSTRUCTIONS
Based on your medical history and these are the current health maintenance or preventive care services that you are due for (some may have been done at this visit)  Health Maintenance Due   Topic Date Due     COPD ACTION PLAN Q1 YR  10/30/2016         At [facility], we strive to deliver an exceptional experience to you, every time we see you.    If you receive a survey in the mail, please send us back your thoughts. We really do value your feedback.    Your care team's suggested websites for health information:  Www.[employer].org : Up to date and easily searchable information on multiple topics.  Www.medlineplus.gov : medication info, interactive tutorials, watch real surgeries online  Www.familydoctor.org : good info from the Academy of Family Physicians  Www.cdc.gov : public health info, travel advisories, epidemics (H1N1)  Www.aap.org : children's health info, normal development, vaccinations  Www.health.[facility].[place].us : [place] dept of health, public health issues in [place], N1N1    How to contact your care team:   Team [facility] [phone]         Pharmacy [phone]    Dr. Sanchez, Melissa Mckeon PA-C, Dr. Morillo, Gaby BANEGAS CNP, Kath Fierro PA-C, Dr. Douglas, and BASSAM Walter CNP    Team RNs: Lizzy & Judy      Clinic hours  M-Th 7 am-7 pm   Fri 7 am-5 pm.   Urgent care M-F 11 am-9 pm,   Sat/Sun 9 am-5 pm.  Pharmacy M-Th 8 am-8 pm Fri 8 am-6 pm  Sat/Sun 9 am-5 pm.     All password changes, disabled accounts, or ID changes in [employer]/MyHealth will be done by our Access Services Department.    If you need help with your account or password, call: [phone]. Clinic staff no longer has the ability to change passwords.     Post Mole removal Instructions    Apply vaseline and a bandage daily until healed.    Do not use hydrogen peroxide to cleanse wound.    After 24 hours, allow water to run over wound gently (do not scub) to cleanse.    With any bleeding, hold  steady pressure (without peeking) for 15 mins. Try twice.    If it continues to bleed for more than 30 minutes, call [phone] during office hours. After office hours, proceed directly to the nearest emergency room.    Make sure to protect the biopsy site from sun exposure to prevent it from turning dark brown.    Once the site is healed, you can gently massage it with vitamin E oil once daily to help the scar lessen.

## 2017-07-12 NOTE — MR AVS SNAPSHOT
After Visit Summary   7/12/2017    Pily Roche    MRN: [id number]           Patient Information     Date Of Birth          1960        Visit Information        Provider Department      7/12/2017 7:20 AM Kath Fierro PA-C [facility]        Today's Diagnoses     Benign neoplasm of skin of trunk, except scrotum    -  1    Urinary incontinence, unspecified type          Care Instructions    Based on your medical history and these are the current health maintenance or preventive care services that you are due for (some may have been done at this visit)  Health Maintenance Due   Topic Date Due     COPD ACTION PLAN Q1 YR  10/30/2016         At [facility], we strive to deliver an exceptional experience to you, every time we see you.    If you receive a survey in the mail, please send us back your thoughts. We really do value your feedback.    Your care team's suggested websites for health information:  Www.[employer] : Up to date and easily searchable information on multiple topics.  Www.medlineplus.gov : medication info, interactive tutorials, watch real surgeries online  Www.familydoctor.org : good info from the Academy of Family Physicians  Www.cdc.gov : public health info, travel advisories, epidemics (H1N1)  Www.aap.org : children's health info, normal development, vaccinations  Www.health.[facility].[place].us : [place] dept of health, public health issues in [place], N1N1    How to contact your care team:   Team [facility] [phone]         Pharmacy [phone]    Dr. Sanchez, Melissa Mckeon PA-C, Gaby Gonzáles APRN CNP, Kath Fierro PA-C, Dr. Douglas, and BASSAM Walter CNP    Team RNs: Lizzy & Judy      Clinic hours  M-Th 7 am-7 pm   Fri 7 am-5 pm.   Urgent care M-F 11 am-9 pm,   Sat/Sun 9 am-5 pm.  Pharmacy M-Th 8 am-8 pm Fri 8 am-6 pm  Sat/Sun 9 am-5 pm.     All password changes, disabled accounts, or ID changes in  [employer]/MyHealth will be done by our Access Services Department.    If you need help with your account or password, call: [phone]. Clinic staff no longer has the ability to change passwords.             Follow-ups after your visit        Additional Services     UROLOGY ADULT REFERRAL       Your provider has referred you to: [place]: [facility] [phone]   [email or web address]    Please be aware that coverage of these services is subject to the terms and limitations of your health insurance plan.  Call member services at your health plan with any benefit or coverage questions.      Please bring the following with you to your appointment:    (1) Any X-Rays, CTs or MRIs which have been performed.  Contact the facility where they were done to arrange for  prior to your scheduled appointment.    (2) List of current medications  (3) This referral request   (4) Any documents/labs given to you for this referral                  Who to contact     If you have questions or need follow up information about today's clinic visit or your schedule please contact [facility] directly at [phone].  Normal or non-critical lab and imaging results will be communicated to you by MyChart, letter or phone within 4 business days after the clinic has received the results. If you do not hear from us within 7 days, please contact the clinic through [employer] or phone. If you have a critical or abnormal lab result, we will notify you by phone as soon as possible.  Submit refill requests through [employer] or call your pharmacy and they will forward the refill request to us. Please allow 3 business days for your refill to be completed.          Additional Information About Your Visit        [employer] Information     [employer] lets you send messages to your doctor, view your test results, renew your prescriptions, schedule appointments and more. To sign up, go to  [email or web address] . Click on \"Log in\" on the left side of the screen, which will take you to the Welcome page. Then click on \"Sign up Now\" on the right side of the page.     You will be asked to enter the access code listed below, as well as some personal information. Please follow the directions to create your username and password.     Your access code is: [email or web address]  Expires: 10/5/2017  7:28 AM     Your access code will  in 90 days. If you need help or a new code, please call your [place] clinic or [phone].        Care EveryWhere ID     This is your Care EveryWhere ID. This could be used by other organizations to access your [place] medical records  [id number]        Your Vitals Were     Pulse Temperature Pulse Oximetry Breastfeeding? BMI (Body Mass Index)       105 96.5  F (35.8  C) (Oral) 95% No 30.73 kg/m2        Blood Pressure from Last 3 Encounters:   17 128/84   17 120/76   16 116/78    Weight from Last 3 Encounters:   17 179 lb (81.2 kg)   17 177 lb (80.3 kg)   16 194 lb (88 kg)              We Performed the Following     UROLOGY ADULT REFERRAL        Primary Care Provider Office Phone # Fax #    Kath Marjan Fierro PA-C [phone]       [facility] [address]  [facility] [address]        Equal Access to Services     PEGGY CARDENAS : Hadii aad ku hadasho Soomaali, waaxda luqadaha, qaybta kaalmada adeegyada, [facility]. So [facility] [phone].    ATENCIÓN: Si habla español, tiene a street disposición servicios gratuitos de asistencia lingüística. Llame al [phone].    We comply with applicable federal civil rights laws and [place] laws. We do not discriminate on the basis of race, color, national origin, age, disability sex, sexual orientation or gender identity.            Thank you!     Thank you for choosing [facility]  for your care. Our goal is always to provide " you with excellent care. Hearing back from our patients is one way we can continue to improve our services. Please take a few minutes to complete the written survey that you may receive in the mail after your visit with us. Thank you!             Your Updated Medication List - Protect others around you: Learn how to safely use, store and throw away your medicines at www.disposemymeds.org.          This list is accurate as of: 7/12/17  8:07 AM.  Always use your most recent med list.                   Brand Name Dispense Instructions for use Diagnosis    albuterol 108 (90 BASE) MCG/ACT Inhaler    VENTOLIN HFA    36 g    INHALE 2 PUFFS EVERY 6 HOURS AS NEEDED FOR SHORTNESS OF BREATH OR DYSPNEA    Chronic bronchitis with COPD (chronic obstructive pulmonary disease) (H)       aspirin 81 MG tablet     90 tablet    Take 1 tablet (81 mg) by mouth At Bedtime    Type 2 diabetes mellitus with stage 2 chronic kidney disease, without long-term current use of insulin (H)       buPROPion 150 MG 12 hr tablet    WELLBUTRIN SR    180 tablet    Take 1 tablet (150 mg) by mouth 2 times daily    Tobacco use disorder, Major depressive disorder, recurrent episode, moderate (H)       diclofenac 75 MG EC tablet    VOLTAREN    180 tablet    TAKE 1 TABLET BY MOUTH TWICE DAILY AS NEEDED FOR MODERATE PAIN.    Chronic shoulder pain, unspecified laterality       fluticasone 50 MCG/ACT spray    FLONASE    48 g    Spray 2 sprays into both nostrils daily    LPRD (laryngopharyngeal reflux disease)       gabapentin 300 MG capsule    NEURONTIN    810 capsule    TAKE 3 CAPSULES BY MOUTH THREE TIMES DAILY    Chronic shoulder pain, unspecified laterality       lidocaine 5 % Patch    LIDODERM    90 patch    APPLY 1 PATCH ONTO SKIN. WEAR FOR 12 HOURS THEN PATCH FREE FOR 12 HOURS    Chronic shoulder pain, unspecified laterality       loratadine 10 MG tablet    CLARITIN    90 tablet    Take 1 tablet (10 mg) by mouth daily    LPRD (laryngopharyngeal reflux  disease)       metFORMIN 500 MG 24 hr tablet    GLUCOPHAGE-XR    360 tablet    Take 4 tablets (2,000 mg) by mouth daily (with dinner)    Type 2 diabetes mellitus with stage 2 chronic kidney disease, without long-term current use of insulin (H)       methocarbamol 500 MG tablet    ROBAXIN    270 tablet    Take 2 tablets (1,000 mg) by mouth 3 times daily as needed for muscle spasms    Chronic shoulder pain, unspecified laterality       mometasone-formoterol 200-5 MCG/ACT oral inhaler    DULERA    3 Inhaler    Inhale 2 puffs into the lungs 2 times daily    Chronic bronchitis with COPD (chronic obstructive pulmonary disease) (H)       omeprazole 20 MG CR capsule    priLOSEC    90 capsule    Take 1 capsule (20 mg) by mouth daily as needed    LPRD (laryngopharyngeal reflux disease)       * order for DME     1 Device    1 glucometer per insurance formulary    Type 2 diabetes mellitus without retinopathy (H)       * order for DME     3 Month    Diabetic test strips and lancets per insurance formulary Check blood sugar once per day    Type 2 diabetes mellitus without retinopathy (H)       simvastatin 40 MG tablet    ZOCOR    90 tablet    Take 1 tablet (40 mg) by mouth At Bedtime    Hyperlipidemia LDL goal <100       traZODone 100 MG tablet    DESYREL    180 tablet    Take 2 tablets (200 mg) by mouth At Bedtime    Major depressive disorder, recurrent episode, moderate (H)       * Notice:  This list has 2 medication(s) that are the same as other medications prescribed for you. Read the directions carefully, and ask your doctor or other care provider to review them with you.

## 2017-07-12 NOTE — PROGRESS NOTES
SUBJECTIVE:                                                    Pily Roche is a 56 year old female who presents to clinic today for the following health issues:      MOLE REMOVAL:       Mole Removal x 5  (erythematous papules that are irritated and she continues to pick at them.  She has had a few removed in the past with relief and would like these removed as well)    Location:   Mole 1: left upper chest (5 mm)  Mole 2: left upper chest (approximately 2 inches below mole 1) 5 mm  Mole 3: right abdomen (5 mm)  Mole 4:  Right upper leg (inner thigh) 5 mm  Mole 5:  Right upper back 5 mm (with scarring from previous mole removal noted      Informed consent was reviewed and patient was agreeable to proceed with procedure.  Risks of scarring, recurrence, infection, and bleeding were reviewed.    The lesion is anesthetized with 1-1.5cc of 2% lidocaine with epinephrine after being prepped with povidone swabs.  The specimen is then removed with a  5 mm punch biopsy, scissors, and foreceps.  Hemostasis was obtained with drysol.  The wound is dressed with vaseline and a bandage.    For any bleeding apply direct pressure for 15 minutes (try twice).  If not successful at stopping the bleeding, call the office or proceed to nearest emergency room.  If wound becomes purulent or erythematous, call the office.  After the first 24 hours pt can allow clean water to run across the wound and gently rinse the area.  Please do not to use hydrogen peroxide to cleanse the wound.  Keep the wound covered with vaseline and a bandage until healed.      She also c/o increased urinary incontinence, has had bladder repair surgery in the past and would like to go back to urologist.  Referral placed.     Kath Fierro PA-C

## 2017-07-12 NOTE — NURSING NOTE
"Chief Complaint   Patient presents with     Lesion Removal     MOLE REMOVAL       Initial /84 (BP Location: Left arm, Patient Position: Chair, Cuff Size: Adult Regular)  Pulse 105  Temp 96.5  F (35.8  C) (Oral)  Wt 179 lb (81.2 kg)  SpO2 95%  Breastfeeding? No  BMI 30.73 kg/m2 Estimated body mass index is 30.73 kg/(m^2) as calculated from the following:    Height as of 7/7/17: 5' 4\" (1.626 m).    Weight as of this encounter: 179 lb (81.2 kg).  Medication Reconciliation: complete   An,CMA (AMAA)      "

## 2017-08-07 ENCOUNTER — TELEPHONE (OUTPATIENT)
Dept: FAMILY MEDICINE | Facility: CLINIC | Age: 57
End: 2017-08-07

## 2017-08-07 DIAGNOSIS — F41.9 ANXIETY: Primary | ICD-10-CM

## 2017-08-07 RX ORDER — CLONAZEPAM 1 MG/1
TABLET ORAL
Refills: 0
Start: 2017-08-07

## 2017-08-08 DIAGNOSIS — F41.9 ANXIETY: ICD-10-CM

## 2017-08-08 NOTE — TELEPHONE ENCOUNTER
clonazePAM (KLONOPIN) 1 MG tablet (Discontinued)      Last Written Prescription Date:  12/21/16  Last Fill Quantity: 60,   # refills: 2  Last Office Visit with [facility] or [facility] prescribing provider: 7/12/17  Future Office visit:       Routing refill request to provider for review/approval because:  Drug not on the [facility], [facility] or [facility] refill protocol or controlled substance          Jordan Faarax  [facility]

## 2017-08-08 NOTE — TELEPHONE ENCOUNTER
clonazePAM (KLONOPIN) 1 MG tablet (Discontinued)      Last Written Prescription Date:  6/21/17  Last Fill Quantity: 60,   # refills: 0  Last Office Visit with [facility], [facility] or [facility] prescribing provider: 7/12/17  Future Office visit:       Routing refill request to provider for review/approval because:  Drug not on the [facility], [facility] or [facility] refill protocol or controlled substance      [facility]

## 2017-08-08 NOTE — TELEPHONE ENCOUNTER
Please call pharmacy, I will not refill this medication (as mentioned in last refill request for this in June, why am I getting this request from the pharmacy?  Do they have it on auto-refill?  If so, please take that off).   Kath Fierro PA-C

## 2017-08-08 NOTE — TELEPHONE ENCOUNTER
Reason for Call:  Other call back    Detailed comments: Patient is requesting a call, does not understand why prescription refill for below medication was not approved. Patient states she was just in July 12th to see Kath Fierro.     Pending Prescriptions:                       Disp   Refills    clonazePAM (KLONOPIN) 1 MG tablet [Pharma*60 tab*0            Sig: TAKE 1 TO 2 TABLETS BY MOUTH AT BEDTIME AS NEEDED    clonazePAM (KLONOPIN) 1 MG tablet         90 tab*0            Sig: Take 1 to 2 tabs by mouth at bedtime as needed.          Phone Number Patient can be reached at: Home number on file [phone] (home)    Best Time: Anytime    Can we leave a detailed message on this number? YES    Call taken on 8/8/2017 at 3:47 PM by Sarina Tatum

## 2017-08-09 RX ORDER — CLONAZEPAM 1 MG/1
TABLET ORAL
Refills: 0
Start: 2017-08-09

## 2017-08-09 NOTE — TELEPHONE ENCOUNTER
Please call Pily, Klonopin is a controlled substance and I told her after her last urine drug screen that I cannot fill any controlled substances for her going forward (which includes Klonopin).   Kath Fierro PA-C

## 2017-08-11 NOTE — TELEPHONE ENCOUNTER
Pt. Informed the her refill for Klonopin has been denied. Pt is requesting some other medication.  Laura Newberry MA

## 2017-08-11 NOTE — TELEPHONE ENCOUNTER
This writer attempted to contact Pily on 08/11/17      Reason for call Medication refill and left message to return call.      When patient calls back, please contact 2nd floor [facility] Care Team (MA/TC). If no one available, document that pt called and route to care team. routine priority .          Laura Newberry, CMA

## 2017-08-16 RX ORDER — HYDROXYZINE HYDROCHLORIDE 25 MG/1
25-50 TABLET, FILM COATED ORAL EVERY 6 HOURS PRN
Qty: 45 TABLET | Refills: 0 | Status: SHIPPED | OUTPATIENT
Start: 2017-08-16 | End: 2017-09-27

## 2017-08-16 NOTE — TELEPHONE ENCOUNTER
Please call Pily,   I have sent a script of hydroxyzine to help with anxiety.  This is like a really strong benadryl.  Do not mix it with drugs or alcohol.  She should f/u in 1 month for a med recheck.  Use it PRN    Kath Fierro PA-C

## 2017-08-16 NOTE — TELEPHONE ENCOUNTER
Reason for Call:  Other returning call    Detailed comments: Pily returned call to clinic. Please try her again     Phone Number Patient can be reached at: Home number on file [phone] (home)    Best Time: Any     Can we leave a detailed message on this number? YES    Call taken on 8/16/2017 at 1:24 PM by Evans Colbert

## 2017-08-16 NOTE — TELEPHONE ENCOUNTER
This writer attempted to contact Pily on 08/16/17    Reason for call  New script sent to pharmacy/ provider's message and left message to return call.    When patient calls back, please contact 2nd floor [place] Care Team (MA/TC). If no one available, document that pt called and route to care team. routine priority .        Sharon Newberry MA

## 2017-08-17 NOTE — TELEPHONE ENCOUNTER
This writer attempted to contact Pily on 08/17/17      Reason for call new medication  and left message to return call.      When patient calls back, please contact clinic RN team. If no one available, document that pt called and route to care team. routine priority.          Martita Ocampo, RN

## 2017-08-21 NOTE — TELEPHONE ENCOUNTER
Called and informed the patient of the medication change and follow up as written by Kath Fierro PA-C below.    Judy Blanchard RN, [facility]

## 2017-08-25 ENCOUNTER — OFFICE VISIT (OUTPATIENT)
Dept: UROLOGY | Facility: CLINIC | Age: 57
End: 2017-08-25
Payer: COMMERCIAL

## 2017-08-25 VITALS — DIASTOLIC BLOOD PRESSURE: 84 MMHG | HEART RATE: 80 BPM | SYSTOLIC BLOOD PRESSURE: 130 MMHG | RESPIRATION RATE: 16 BRPM

## 2017-08-25 DIAGNOSIS — T83.721A EXPOSURE OF VAGINAL MESH THROUGH VAGINAL WALL, INITIAL ENCOUNTER (H): ICD-10-CM

## 2017-08-25 DIAGNOSIS — N39.42 URINARY INCONTINENCE WITHOUT SENSORY AWARENESS: Primary | ICD-10-CM

## 2017-08-25 LAB
ALBUMIN UR-MCNC: 30 MG/DL
APPEARANCE UR: CLEAR
BACTERIA #/AREA URNS HPF: ABNORMAL /HPF
BILIRUB UR QL STRIP: ABNORMAL
COLOR UR AUTO: YELLOW
GLUCOSE UR STRIP-MCNC: NEGATIVE MG/DL
HGB UR QL STRIP: NEGATIVE
HYALINE CASTS #/AREA URNS LPF: ABNORMAL /LPF
KETONES UR STRIP-MCNC: ABNORMAL MG/DL
LEUKOCYTE ESTERASE UR QL STRIP: ABNORMAL
NITRATE UR QL: NEGATIVE
NON-SQ EPI CELLS #/AREA URNS LPF: ABNORMAL /LPF
PH UR STRIP: 5 PH (ref 5–7)
RBC #/AREA URNS AUTO: ABNORMAL /HPF
SOURCE: ABNORMAL
SP GR UR STRIP: >1.03 (ref 1–1.03)
UROBILINOGEN UR STRIP-ACNC: 0.2 EU/DL (ref 0.2–1)
WBC #/AREA URNS AUTO: ABNORMAL /HPF

## 2017-08-25 PROCEDURE — 99204 OFFICE O/P NEW MOD 45 MIN: CPT | Mod: 25 | Performed by: UROLOGY

## 2017-08-25 PROCEDURE — 51798 US URINE CAPACITY MEASURE: CPT | Performed by: UROLOGY

## 2017-08-25 PROCEDURE — 81001 URINALYSIS AUTO W/SCOPE: CPT | Performed by: UROLOGY

## 2017-08-25 RX ORDER — OXYBUTYNIN CHLORIDE 5 MG/1
5 TABLET, EXTENDED RELEASE ORAL DAILY
Qty: 30 TABLET | Refills: 1 | Status: SHIPPED | OUTPATIENT
Start: 2017-08-25 | End: 2017-12-04

## 2017-08-25 NOTE — PATIENT INSTRUCTIONS
Your next cystoscopy is scheduled 09/19/2017 @ 2:30 Please call  if you need to reschedule this appointment. Please hold blood thinners for one week prior.

## 2017-08-25 NOTE — PROGRESS NOTES
CC:    HPI:  Pily Roche is a 56 year old female asked to be seen in consultation  for urinary incontinence.  This problem has been going on for several years and has been getting worse.  It is associated with unaware incontinence.  She has several episodes of incontinence per day and has been using 3 small pads per day.  She has  had  previous treatment for her urinary incontinence. She is s/p TVT in 2008.  The patient voids q3-4 hours, nocturia X 0-1.  She drinks normally.  She denies any dysuria, nocturia, hematuria, pyuria, hesitency, intermittency, feeling of incomplete emptying, or any recent hx of UTI's or stones.  She has no gross hematuria.      The patient has no constipation or splinting.  She is sexually active and denies any dyspareunia or pelvic pain.   She denies any vaginal bulge. She has no Neurological or balance problems          Current Outpatient Prescriptions   Medication Sig Dispense Refill     oxybutynin (DITROPAN-XL) 5 MG 24 hr tablet Take 1 tablet (5 mg) by mouth daily 30 tablet 1     hydrOXYzine (ATARAX) 25 MG tablet Take 1-2 tablets (25-50 mg) by mouth every 6 hours as needed for anxiety 45 tablet 0     omeprazole (PRILOSEC) 20 MG CR capsule Take 1 capsule (20 mg) by mouth daily as needed 90 capsule 1     methocarbamol (ROBAXIN) 500 MG tablet Take 2 tablets (1,000 mg) by mouth 3 times daily as needed for muscle spasms 270 tablet 1     traZODone (DESYREL) 100 MG tablet Take 2 tablets (200 mg) by mouth At Bedtime 180 tablet 1     gabapentin (NEURONTIN) 300 MG capsule TAKE 3 CAPSULES BY MOUTH THREE TIMES DAILY 810 capsule 1     mometasone-formoterol (DULERA) 200-5 MCG/ACT oral inhaler Inhale 2 puffs into the lungs 2 times daily 3 Inhaler 1     simvastatin (ZOCOR) 40 MG tablet Take 1 tablet (40 mg) by mouth At Bedtime 90 tablet 1     metFORMIN (GLUCOPHAGE-XR) 500 MG 24 hr tablet Take 4 tablets (2,000 mg) by mouth daily (with dinner) 360 tablet 1     diclofenac (VOLTAREN) 75 MG EC tablet  "TAKE 1 TABLET BY MOUTH TWICE DAILY AS NEEDED FOR MODERATE PAIN. 180 tablet 1     buPROPion (WELLBUTRIN SR) 150 MG 12 hr tablet Take 1 tablet (150 mg) by mouth 2 times daily 180 tablet 1     albuterol (VENTOLIN HFA) 108 (90 BASE) MCG/ACT Inhaler INHALE 2 PUFFS EVERY 6 HOURS AS NEEDED FOR SHORTNESS OF BREATH OR DYSPNEA 36 g 1     fluticasone (FLONASE) 50 MCG/ACT spray Spray 2 sprays into both nostrils daily 48 g 1     aspirin 81 MG tablet Take 1 tablet (81 mg) by mouth At Bedtime 90 tablet 3     lidocaine (LIDODERM) 5 % Patch APPLY 1 PATCH ONTO SKIN. WEAR FOR 12 HOURS THEN PATCH FREE FOR 12 HOURS 90 patch 1     order for DME 1 glucometer per insurance formulary 1 Device 0     order for DME Diabetic test strips and lancets per insurance formulary Check blood sugar once per day 3 Month 1     loratadine (CLARITIN) 10 MG tablet Take 1 tablet (10 mg) by mouth daily (Patient not taking: Reported on 8/25/2017) 90 tablet 1     Allergies   Allergen Reactions     Quetiapine Anaphylaxis     Seroquel [Quetiapine Fumarate]      Insomnia  , anhedonia       Ambien [Zolpidem Tartrate] Difficulty breathing     Reaction after taking 5 mg on Wed, Thurs, then drank Friday afternoon and developed symptoms,  Irrability.       Zolpidem Unknown     Past Medical History:   Diagnosis Date     Advanced directives, counseling/discussion 2/26/2013    Patient does not have an Advance/Health Care Directive (HCD), given \"What is Advance Care Planning?\" flyer.  Maureen Iglesias February 26, 2013      Arthritis      Disorders of bursae and tendons in shoulder region, unspecified 6/8/2010     Dysfunctional uterine bleeding      History of substance abuse 2006    cocaine, completed rehab     Hyperlipidemia      Insomnia      Lyme disease 1990     Pain in shoulder 3/16/2010     Seasonal allergies      Tobacco use disorders      Type 2 diabetes, HbA1c goal < 7% (H) 7/18/2013     Past Surgical History:   Procedure Laterality Date     C SHOULDER SURG PROC " UNLISTED  1992    X 3 (broken clavicle and rotator cuff tear with impingement     HC SACROPLASTY  1990's    Herniated L4-L5 X 2     HYSTERECTOMY, PAP NO LONGER INDICATED  2005     HYSTERECTOMY, VAGINAL  6/28/05    Ovaries in Place      Family History   Problem Relation Age of Onset     DIABETES Mother      Hypertension Mother      Gynecology Mother      Arthritis Mother      Thyroid Disease Mother      Allergies Mother      Lipids Father      HEART DISEASE Father      C.A.D. Father      Hypertension Maternal Grandmother      Arthritis Maternal Grandmother      CANCER Maternal Grandmother      CANCER Maternal Grandfather      C.A.D. Maternal Grandfather      Asthma Paternal Grandmother      C.A.D. Paternal Grandmother      CEREBROVASCULAR DISEASE Paternal Grandfather      Alzheimer Disease Paternal Grandfather      Arthritis Paternal Grandfather      C.A.D. Paternal Grandfather      Cardiovascular Paternal Grandfather      Circulatory Paternal Grandfather      Glaucoma No family hx of      Macular Degeneration No family hx of      Social History     Social History     Marital status:      Spouse name: N/A     Number of children: N/A     Years of education: N/A     Social History Main Topics     Smoking status: Current Every Day Smoker     Packs/day: 1.50     Years: 30.00     Types: Cigarettes     Smokeless tobacco: Never Used      Comment: 1/2 pack to 1 ppd, has an ecig     Alcohol use 3.0 - 6.0 oz/week     5 - 10 Standard drinks or equivalent per week      Comment: 2beers every few fews     Drug use: Yes      Comment: marijuana     Sexual activity: Yes     Partners: Male     Other Topics Concern     Parent/Sibling W/ Cabg, Mi Or Angioplasty Before 65f 55m? Yes      Service No     Blood Transfusions No     Caffeine Concern No     Occupational Exposure No     Hobby Hazards No     Sleep Concern Yes     Stress Concern Yes     Weight Concern Yes     Special Diet No     Back Care Yes     Exercise Yes      Bike Helmet No     Seat Belt Yes     Self-Exams No     Social History Narrative       REVIEW OF SYSTEMS  =================  C: NEGATIVE for fever, chills, change in weight  I: NEGATIVE for worrisome rashes, moles or lesions  E/M: NEGATIVE for ear, mouth and throat problems  R: NEGATIVE for significant cough or SHORTNESS OF BREATH  CV:  NEGATIVE for chest pain, palpitations or peripheral edema  GI: NEGATIVE for nausea, abdominal pain, heartburn, or change in bowel habits  NEURO: NEGATIVE numbness/weakness  : see HPI  PSYCH: NEGATIVE depression/anxiety  LYmph: no new enlarged lymph nodes  Ortho: no new trauma/movements      Physical Exam:  /84 (BP Location: Right arm, Patient Position: Chair, Cuff Size: Adult Regular)  Pulse 80  Resp 16   Patient is pleasant, in no acute distress, good general condition.  HEENT:  Normalcephalic, atraumatic  Lung: no evidence of respiratory distress    Abdomen: Soft, nondistended, non tender. No masses. No rebound or guarding.  :  Normal external genitalia and introitus. Mesh extrusion seen on exam.  Skin: Warm and dry.  No redness.  Neuro: grossly normal  Psych normal mood and affect  Musculoskeletal  moving all extremities    RU: 0 cc    UA dipstick: neg        Office Visit on 07/07/2017   Component Date Value Ref Range Status     Hemoglobin A1C 07/07/2017 6.3* 4.3 - 6.0 % Final     Cholesterol 07/07/2017 162  <200 mg/dL Final     Triglycerides 07/07/2017 106  <150 mg/dL Final    Fasting specimen     HDL Cholesterol 07/07/2017 50  >49 mg/dL Final     LDL Cholesterol Calculated 07/07/2017 91  <100 mg/dL Final    Desirable:       <100 mg/dl     Non HDL Cholesterol 07/07/2017 112  <130 mg/dL Final     Sodium 07/07/2017 141  133 - 144 mmol/L Final     Potassium 07/07/2017 4.1  3.4 - 5.3 mmol/L Final     Chloride 07/07/2017 107  94 - 109 mmol/L Final     Carbon Dioxide 07/07/2017 26  20 - 32 mmol/L Final     Anion Gap 07/07/2017 8  3 - 14 mmol/L Final     Glucose 07/07/2017  105* 70 - 99 mg/dL Final    Fasting specimen     Urea Nitrogen 07/07/2017 19  7 - 30 mg/dL Final     Creatinine 07/07/2017 0.77  0.52 - 1.04 mg/dL Final     GFR Estimate 07/07/2017 78  >60 mL/min/1.7m2 Final    Non  GFR Calc     GFR Estimate If Black 07/07/2017   >60 mL/min/1.7m2 Final                    Value:>90   GFR Calc       Calcium 07/07/2017 9.4  8.5 - 10.1 mg/dL Final     Bilirubin Total 07/07/2017 0.4  0.2 - 1.3 mg/dL Final     Albumin 07/07/2017 4.2  3.4 - 5.0 g/dL Final     Protein Total 07/07/2017 7.5  6.8 - 8.8 g/dL Final     Alkaline Phosphatase 07/07/2017 116  40 - 150 U/L Final     ALT 07/07/2017 38  0 - 50 U/L Final     AST 07/07/2017 23  0 - 45 U/L Final     Creatinine Urine 07/07/2017 105  mg/dL Final     Albumin Urine mg/L 07/07/2017 12  mg/L Final     Albumin Urine mg/g Cr 07/07/2017 11.24  0 - 25 mg/g Cr Final       IMAGING:    ASSESSMENT and PLAN:  This is a 56 year old female with unaware incontinence with mesh extrusion seen on exam today.    Will schedule patient for cysto and mesh excision next.    Will start her on ditropan xl 5 mg po q day.  Side effects dsicussed.        Antonio Diallo MD

## 2017-08-25 NOTE — MR AVS SNAPSHOT
"              After Visit Summary   8/25/2017    Pily Roche    MRN: [id number]           Patient Information     Date Of Birth          1960        Visit Information        Provider Department      8/25/2017 9:00 AM Antonio Diallo MD [facility]        Today's Diagnoses     Urinary incontinence without sensory awareness    -  1    Exposure of vaginal mesh through vaginal wall, initial encounter (H)          Care Instructions    Your next cystoscopy is scheduled 09/19/2017 @ 2:30 Please call  if you need to reschedule this appointment. Please hold blood thinners for one week prior.                 Follow-ups after your visit        Your next 10 appointments already scheduled     Sep 19, 2017  2:30 PM CDT   Return Visit with Antonio Diallo MD, [facility] CYSTO PROC ROOM   [facility] ([address] [id number] [phone]              Who to contact     If you have questions or need follow up information about today's clinic visit or your schedule please contact [facility] directly at [phone].  Normal or non-critical lab and imaging results will be communicated to you by [employer], letter or phone within 4 business days after the clinic has received the results. If you do not hear from us within 7 days, please contact the clinic through [facility] or phone. If you have a critical or abnormal lab result, we will notify you by phone as soon as possible.  Submit refill requests through [facility] or call your pharmacy and they will forward the refill request to us. Please allow 3 business days for your refill to be completed.          Additional Information About Your Visit        [facility] Information     [employer] lets you send messages to your doctor, view your test results, renew your prescriptions, schedule appointments and more. To sign up, go to [email or web address] . Click on \"Log in\" on the left " "side of the screen, which will take you to the Welcome page. Then click on \"Sign up Now\" on the right side of the page.     You will be asked to enter the access code listed below, as well as some personal information. Please follow the directions to create your username and password.     Your access code is: [email or web address]  Expires: 10/5/2017  7:28 AM     Your access code will  in 90 days. If you need help or a new code, please call your [facility] or [phone].        Care EveryWhere ID     This is your Care EveryWhere ID. This could be used by other organizations to access your [place] medical records  [id number]        Your Vitals Were     Pulse Respirations                80 16           Blood Pressure from Last 3 Encounters:   17 130/84   17 128/84   17 120/76    Weight from Last 3 Encounters:   17 81.2 kg (179 lb)   17 80.3 kg (177 lb)   16 88 kg (194 lb)              We Performed the Following     MEASURE POST-VOID RESIDUAL URINE/BLADDER CAPACITY, US NON-IMAGING (02603)     UA reflex to Microscopic and Culture     Urine Microscopic          Today's Medication Changes          These changes are accurate as of: 17  9:25 AM.  If you have any questions, ask your nurse or doctor.               Start taking these medicines.        Dose/Directions    oxybutynin 5 MG 24 hr tablet   Commonly known as:  DITROPAN-XL   Used for:  Urinary incontinence without sensory awareness   Started by:  Antonio Diallo MD        Dose:  5 mg   Take 1 tablet (5 mg) by mouth daily   Quantity:  30 tablet   Refills:  1            Where to get your medicines      These medications were sent to [facility] [address] [facility], [place] - [address] [facility] [address] [place] [address]  [address] [facility] [address]     Phone:  [phone]     oxybutynin 5 MG 24 hr tablet                Primary Care Provider Office Phone # Fax #    Kath Marjan " SAQIB Fierro [phone]       [address]  [place] [facility] [address]        Equal Access to Services     KARISSA CARDENAS : Wayne ashley hutson geo Sodayanna, wamichaelada luqcarlosha, qaybta kaalmada adebelinda, lincoln jefferson lashielamoe wells. So [facility] [phone].    ATENCIÓN: Si habla español, tiene a street disposición servicios gratuitos de asistencia lingüística. Llame al [phone].    We comply with applicable federal civil rights laws and [place] laws. We do not discriminate on the basis of race, color, national origin, age, disability sex, sexual orientation or gender identity.            Thank you!     Thank you for choosing [facility]  for your care. Our goal is always to provide you with excellent care. Hearing back from our patients is one way we can continue to improve our services. Please take a few minutes to complete the written survey that you may receive in the mail after your visit with us. Thank you!             Your Updated Medication List - Protect others around you: Learn how to safely use, store and throw away your medicines at www.disposemymeds.org.          This list is accurate as of: 8/25/17  9:25 AM.  Always use your most recent med list.                   Brand Name Dispense Instructions for use Diagnosis    albuterol 108 (90 BASE) MCG/ACT Inhaler    VENTOLIN HFA    36 g    INHALE 2 PUFFS EVERY 6 HOURS AS NEEDED FOR SHORTNESS OF BREATH OR DYSPNEA    Chronic bronchitis with COPD (chronic obstructive pulmonary disease) (H)       aspirin 81 MG tablet     90 tablet    Take 1 tablet (81 mg) by mouth At Bedtime    Type 2 diabetes mellitus with stage 2 chronic kidney disease, without long-term current use of insulin (H)       buPROPion 150 MG 12 hr tablet    WELLBUTRIN SR    180 tablet    Take 1 tablet (150 mg) by mouth 2 times daily    Tobacco use disorder, Major depressive disorder, recurrent episode, moderate (H)       diclofenac 75 MG EC tablet    VOLTAREN    180  tablet    TAKE 1 TABLET BY MOUTH TWICE DAILY AS NEEDED FOR MODERATE PAIN.    Chronic shoulder pain, unspecified laterality       fluticasone 50 MCG/ACT spray    FLONASE    48 g    Spray 2 sprays into both nostrils daily    LPRD (laryngopharyngeal reflux disease)       gabapentin 300 MG capsule    NEURONTIN    810 capsule    TAKE 3 CAPSULES BY MOUTH THREE TIMES DAILY    Chronic shoulder pain, unspecified laterality       hydrOXYzine 25 MG tablet    ATARAX    45 tablet    Take 1-2 tablets (25-50 mg) by mouth every 6 hours as needed for anxiety    Anxiety       lidocaine 5 % Patch    LIDODERM    90 patch    APPLY 1 PATCH ONTO SKIN. WEAR FOR 12 HOURS THEN PATCH FREE FOR 12 HOURS    Chronic shoulder pain, unspecified laterality       loratadine 10 MG tablet    CLARITIN    90 tablet    Take 1 tablet (10 mg) by mouth daily    LPRD (laryngopharyngeal reflux disease)       metFORMIN 500 MG 24 hr tablet    GLUCOPHAGE-XR    360 tablet    Take 4 tablets (2,000 mg) by mouth daily (with dinner)    Type 2 diabetes mellitus with stage 2 chronic kidney disease, without long-term current use of insulin (H)       methocarbamol 500 MG tablet    ROBAXIN    270 tablet    Take 2 tablets (1,000 mg) by mouth 3 times daily as needed for muscle spasms    Chronic shoulder pain, unspecified laterality       mometasone-formoterol 200-5 MCG/ACT oral inhaler    DULERA    3 Inhaler    Inhale 2 puffs into the lungs 2 times daily    Chronic bronchitis with COPD (chronic obstructive pulmonary disease) (H)       omeprazole 20 MG CR capsule    priLOSEC    90 capsule    Take 1 capsule (20 mg) by mouth daily as needed    LPRD (laryngopharyngeal reflux disease)       * order for DME     1 Device    1 glucometer per insurance formulary    Type 2 diabetes mellitus without retinopathy (H)       * order for DME     3 Month    Diabetic test strips and lancets per insurance formulary Check blood sugar once per day    Type 2 diabetes mellitus without retinopathy  (H)       oxybutynin 5 MG 24 hr tablet    DITROPAN-XL    30 tablet    Take 1 tablet (5 mg) by mouth daily    Urinary incontinence without sensory awareness       simvastatin 40 MG tablet    ZOCOR    90 tablet    Take 1 tablet (40 mg) by mouth At Bedtime    Hyperlipidemia LDL goal <100       traZODone 100 MG tablet    DESYREL    180 tablet    Take 2 tablets (200 mg) by mouth At Bedtime    Major depressive disorder, recurrent episode, moderate (H)       * Notice:  This list has 2 medication(s) that are the same as other medications prescribed for you. Read the directions carefully, and ask your doctor or other care provider to review them with you.

## 2017-08-25 NOTE — NURSING NOTE
"Chief Complaint   Patient presents with     Consult For     incontinence       Initial /84 (BP Location: Right arm, Patient Position: Chair, Cuff Size: Adult Regular)  Pulse 80  Resp 16 Estimated body mass index is 30.73 kg/(m^2) as calculated from the following:    Height as of 7/7/17: 1.626 m (5' 4\").    Weight as of 7/12/17: 81.2 kg (179 lb).  Medication Reconciliation: complete   Nan Brown CMA      "

## 2017-09-19 ENCOUNTER — OFFICE VISIT (OUTPATIENT)
Dept: UROLOGY | Facility: CLINIC | Age: 57
End: 2017-09-19
Payer: COMMERCIAL

## 2017-09-19 DIAGNOSIS — T83.721A EXPOSURE OF VAGINAL MESH THROUGH VAGINAL WALL, INITIAL ENCOUNTER (H): Primary | ICD-10-CM

## 2017-09-19 DIAGNOSIS — N39.42 URINARY INCONTINENCE WITHOUT SENSORY AWARENESS: ICD-10-CM

## 2017-09-19 PROCEDURE — 99207 ZZC DROP WITH A PROCEDURE: CPT | Mod: 25 | Performed by: UROLOGY

## 2017-09-19 PROCEDURE — 57295 REVISE VAG GRAFT VIA VAGINA: CPT | Performed by: UROLOGY

## 2017-09-19 RX ORDER — CIPROFLOXACIN 500 MG/1
500 TABLET, FILM COATED ORAL ONCE
Qty: 1 TABLET | Refills: 0
Start: 2017-09-19 | End: 2017-09-19

## 2017-09-19 NOTE — PROGRESS NOTES
The patient is here for cystoscopy and vaginal mesh excision.         Patient is prepped and draped.    A small piece of TVT mesh was seen.  This was then cut at both edges.    Cysto was done next.    Flexible cystoscope placed under direct vision.      Cysto: The anterior urethra is normal     In the bladder there is normal mucosa.    Assessment/Plan:  (T86.671X) Exposure of vaginal mesh through vaginal wall, initial encounter (H)  (primary encounter diagnosis)  Comment:    Plan: REVISION VAGINAL GRAFT           See in several months for re-exam    (N39.42) Urinary incontinence without sensory awareness  Comment:    Plan: ? Vaginal discharge from mesh           See in several months for re-exam.

## 2017-09-19 NOTE — PROGRESS NOTES
The following medication was given:     MEDICATION:  Ciprofloxacin   ROUTE: PO  SITE: mouth  DOSE: 500 mg   LOT #: [id number]  : [employer]   EXPIRATION DATE: 12/18  NDC#: 53803-429-35   Was there drug waste? No    Rachael Cooley RN

## 2017-09-19 NOTE — MR AVS SNAPSHOT
"              After Visit Summary   9/19/2017    Pily Roche    MRN: [id number]           Patient Information     Date Of Birth          1960        Visit Information        Provider Department      9/19/2017 2:30 PM Antonio Diallo MD; [facility]OM [facility]        Today's Diagnoses     Exposure of vaginal mesh through vaginal wall, initial encounter (H)    -  1    Urinary incontinence without sensory awareness           Follow-ups after your visit        Your next 10 appointments already scheduled     Oct 06, 2017  8:00 AM CDT   Return Visit with Antonio Diallo MD   [facility] ([facility])    [address] [place] [address]  [place] [id number] [phone]              Who to contact     If you have questions or need follow up information about today's clinic visit or your schedule please contact [facility] directly at [phone].  Normal or non-critical lab and imaging results will be communicated to you by MyChart, letter or phone within 4 business days after the clinic has received the results. If you do not hear from us within 7 days, please contact the clinic through MyChart or phone. If you have a critical or abnormal lab result, we will notify you by phone as soon as possible.  Submit refill requests through [email or web address] or call your pharmacy and they will forward the refill request to us. Please allow 3 business days for your refill to be completed.          Additional Information About Your Visit        [employer] Information     [email or web address] lets you send messages to your doctor, view your test results, renew your prescriptions, schedule appointments and more. To sign up, go to [email or web address] . Click on \"Log in\" on the left side of the screen, which will take you to the Welcome page. Then click on \"Sign up Now\" on the right side of the page.     You will be asked to enter the access code listed below, as well as some " personal information. Please follow the directions to create your username and password.     Your access code is: [email or web address]  Expires: 10/5/2017  7:28 AM     Your access code will  in 90 days. If you need help or a new code, please call your [place] clinic or [phone].        Care EveryWhere ID     This is your Care EveryWhere ID. This could be used by other organizations to access your [place] medical records  [id number]         Blood Pressure from Last 3 Encounters:   17 130/84   17 128/84   17 120/76    Weight from Last 3 Encounters:   17 81.2 kg (179 lb)   17 80.3 kg (177 lb)   16 88 kg (194 lb)              We Performed the Following     CYSTOURETHROSCOPY     REVISION VAGINAL GRAFT          Today's Medication Changes          These changes are accurate as of: 17  3:11 PM.  If you have any questions, ask your nurse or doctor.               Start taking these medicines.        Dose/Directions    ciprofloxacin 500 MG tablet   Commonly known as:  CIPRO   Used for:  Urinary incontinence without sensory awareness   Started by:  Antonio Diallo MD        Dose:  500 mg   Take 1 tablet (500 mg) by mouth once for 1 dose   Quantity:  1 tablet   Refills:  0            Where to get your medicines      Some of these will need a paper prescription and others can be bought over the counter.  Ask your nurse if you have questions.     You don't need a prescription for these medications     ciprofloxacin 500 MG tablet                Primary Care Provider Office Phone # Fax #    Kath Marjan Fierro PA-C [phone]       [address]  [facility] [address]        Equal Access to Services     [facility]: Hadii aad ku hadashclarke Sodayanna, waaxda luqadaha, qaybta kaalmada stacie, [facility]. So [facility] [phone].    ATENCIÓN: Si habla español, tiene a street disposición servicios gratuitos de asistencia lingüística. Llame al  [phone].    We comply with applicable federal civil rights laws and [place] laws. We do not discriminate on the basis of race, color, national origin, age, disability sex, sexual orientation or gender identity.            Thank you!     [facility] you [facility]  [facility] your [facility]. Our goal is always to provide you with excellent care. Hearing back from our patients is one way we can continue to improve our services. Please take a few minutes to complete the written survey that you may receive in the mail after your visit with us. Thank you!             Your Updated Medication List - Protect others around you: Learn how to safely use, store and throw away your medicines at www.disposemymeds.org.          This list is accurate as of: 9/19/17  3:11 PM.  Always use your most recent med list.                   Brand Name Dispense Instructions for use Diagnosis    albuterol 108 (90 BASE) MCG/ACT Inhaler    VENTOLIN HFA    36 g    INHALE 2 PUFFS EVERY 6 HOURS AS NEEDED FOR SHORTNESS OF BREATH OR DYSPNEA    Chronic bronchitis with COPD (chronic obstructive pulmonary disease) (H)       aspirin 81 MG tablet     90 tablet    Take 1 tablet (81 mg) by mouth At Bedtime    Type 2 diabetes mellitus with stage 2 chronic kidney disease, without long-term current use of insulin (H)       buPROPion 150 MG 12 hr tablet    WELLBUTRIN SR    180 tablet    Take 1 tablet (150 mg) by mouth 2 times daily    Tobacco use disorder, Major depressive disorder, recurrent episode, moderate (H)       ciprofloxacin 500 MG tablet    CIPRO    1 tablet    Take 1 tablet (500 mg) by mouth once for 1 dose    Urinary incontinence without sensory awareness       diclofenac 75 MG EC tablet    VOLTAREN    180 tablet    TAKE 1 TABLET BY MOUTH TWICE DAILY AS NEEDED FOR MODERATE PAIN.    Chronic shoulder pain, unspecified laterality       fluticasone 50 MCG/ACT spray    FLONASE    48 g    Spray 2 sprays into both nostrils daily    LPRD  (laryngopharyngeal reflux disease)       gabapentin 300 MG capsule    NEURONTIN    810 capsule    TAKE 3 CAPSULES BY MOUTH THREE TIMES DAILY    Chronic shoulder pain, unspecified laterality       hydrOXYzine 25 MG tablet    ATARAX    45 tablet    Take 1-2 tablets (25-50 mg) by mouth every 6 hours as needed for anxiety    Anxiety       lidocaine 5 % Patch    LIDODERM    90 patch    APPLY 1 PATCH ONTO SKIN. WEAR FOR 12 HOURS THEN PATCH FREE FOR 12 HOURS    Chronic shoulder pain, unspecified laterality       loratadine 10 MG tablet    CLARITIN    90 tablet    Take 1 tablet (10 mg) by mouth daily    LPRD (laryngopharyngeal reflux disease)       metFORMIN 500 MG 24 hr tablet    GLUCOPHAGE-XR    360 tablet    Take 4 tablets (2,000 mg) by mouth daily (with dinner)    Type 2 diabetes mellitus with stage 2 chronic kidney disease, without long-term current use of insulin (H)       methocarbamol 500 MG tablet    ROBAXIN    270 tablet    Take 2 tablets (1,000 mg) by mouth 3 times daily as needed for muscle spasms    Chronic shoulder pain, unspecified laterality       mometasone-formoterol 200-5 MCG/ACT oral inhaler    DULERA    3 Inhaler    Inhale 2 puffs into the lungs 2 times daily    Chronic bronchitis with COPD (chronic obstructive pulmonary disease) (H)       omeprazole 20 MG CR capsule    priLOSEC    90 capsule    Take 1 capsule (20 mg) by mouth daily as needed    LPRD (laryngopharyngeal reflux disease)       * order for DME     1 Device    1 glucometer per insurance formulary    Type 2 diabetes mellitus without retinopathy (H)       * order for DME     3 Month    Diabetic test strips and lancets per insurance formulary Check blood sugar once per day    Type 2 diabetes mellitus without retinopathy (H)       oxybutynin 5 MG 24 hr tablet    DITROPAN-XL    30 tablet    Take 1 tablet (5 mg) by mouth daily    Urinary incontinence without sensory awareness       simvastatin 40 MG tablet    ZOCOR    90 tablet    Take 1 tablet  (40 mg) by mouth At Bedtime    Hyperlipidemia LDL goal <100       traZODone 100 MG tablet    DESYREL    180 tablet    Take 2 tablets (200 mg) by mouth At Bedtime    Major depressive disorder, recurrent episode, moderate (H)       * Notice:  This list has 2 medication(s) that are the same as other medications prescribed for you. Read the directions carefully, and ask your doctor or other care provider to review them with you.

## 2017-09-26 ENCOUNTER — APPOINTMENT (OUTPATIENT)
Dept: OPTOMETRY | Facility: CLINIC | Age: 57
End: 2017-09-26
Payer: COMMERCIAL

## 2017-09-26 ENCOUNTER — OFFICE VISIT (OUTPATIENT)
Dept: OPTOMETRY | Facility: CLINIC | Age: 57
End: 2017-09-26
Payer: COMMERCIAL

## 2017-09-26 DIAGNOSIS — H52.03 HYPEROPIA, BILATERAL: ICD-10-CM

## 2017-09-26 DIAGNOSIS — E11.9 TYPE 2 DIABETES MELLITUS WITHOUT RETINOPATHY (H): Primary | ICD-10-CM

## 2017-09-26 DIAGNOSIS — H52.223 REGULAR ASTIGMATISM OF BOTH EYES: ICD-10-CM

## 2017-09-26 DIAGNOSIS — H52.4 PRESBYOPIA: ICD-10-CM

## 2017-09-26 PROCEDURE — 92015 DETERMINE REFRACTIVE STATE: CPT | Performed by: OPTOMETRIST

## 2017-09-26 PROCEDURE — 92341 FIT SPECTACLES BIFOCAL: CPT | Performed by: OPTOMETRIST

## 2017-09-26 PROCEDURE — 99212 OFFICE O/P EST SF 10 MIN: CPT | Performed by: OPTOMETRIST

## 2017-09-26 ASSESSMENT — VISUAL ACUITY
METHOD: SNELLEN - LINEAR
OD_CC+: -1
OD_CC: 20/20
OS_CC: 20/30
OS_CC+: -2
CORRECTION_TYPE: GLASSES

## 2017-09-26 ASSESSMENT — REFRACTION_WEARINGRX
OS_CYLINDER: +1.00
SPECS_TYPE: BIFOCAL
OD_AXIS: 035
OD_SPHERE: PLANO
OD_SPHERE: PLANO
OS_AXIS: 120
OS_ADD: +1.75
OD_ADD: +1.75
OS_SPHERE: -0.50
OD_CYLINDER: +0.75
OD_CYLINDER: +0.50
OS_AXIS: 118
OS_ADD: +2.00
OD_ADD: +2.00
OS_CYLINDER: +0.75
OS_SPHERE: -0.25
OD_AXIS: 60

## 2017-09-26 ASSESSMENT — REFRACTION_MANIFEST
OS_ADD: +2.00
OD_SPHERE: PLANO
OS_SPHERE: -0.25
OD_AXIS: 035
OS_CYLINDER: +0.75
OD_CYLINDER: +0.50
OD_ADD: +2.00
OS_AXIS: 120

## 2017-09-26 ASSESSMENT — CUP TO DISC RATIO
OD_RATIO: 0.2
OS_RATIO: 0.2

## 2017-09-26 ASSESSMENT — SLIT LAMP EXAM - LIDS
COMMENTS: NORMAL
COMMENTS: NORMAL

## 2017-09-26 ASSESSMENT — EXTERNAL EXAM - LEFT EYE: OS_EXAM: NORMAL

## 2017-09-26 ASSESSMENT — EXTERNAL EXAM - RIGHT EYE: OD_EXAM: NORMAL

## 2017-09-26 NOTE — MR AVS SNAPSHOT
After Visit Summary   9/26/2017    Pily Roche    MRN: [id number]           Patient Information     Date Of Birth          1960        Visit Information        Provider Department      9/26/2017 3:20 PM Efrain Garcia, CLAUDIA [facility]        Today's Diagnoses     Type 2 diabetes mellitus without retinopathy (H)    -  1    Regular astigmatism of both eyes        Hyperopia, bilateral        Presbyopia          Care Instructions    Recommend new glasses.    Return 2/18 for a complete eye exam or sooner if needed.    Efrain Garcia OD            Follow-ups after your visit        Follow-up notes from your care team     Return in about 4 months (around 2/1/2018), or if symptoms worsen or fail to improve, for Annual Visit.      Your next 10 appointments already scheduled     Sep 27, 2017  9:00 AM CDT   SHORT with Kath Fierro PA-C   [facility] ([facility])    [address] [facility] [address]   [phone]            Oct 06, 2017  8:00 AM CDT   Return Visit with Antonio Diallo MD   [facility] ([address] [address] [phone]              Who to contact     If you have questions or need follow up information about today's clinic visit or your schedule please contact [facility] directly at [phone].  Normal or non-critical lab and imaging results will be communicated to you by MyChart, letter or phone within 4 business days after the clinic has received the results. If you do not hear from us within 7 days, please contact the clinic through MyChart or phone. If you have a critical or abnormal lab result, we will notify you by phone as soon as possible.  Submit refill requests through [email or web address] or call your pharmacy and they will forward the refill request to us. Please allow 3 business days for your refill to be completed.           "Additional Information About Your Visit        MyChart Information     [employer] lets you send messages to your doctor, view your test results, renew your prescriptions, schedule appointments and more. To sign up, go to [email or web address] . Click on \"Log in\" on the left side of the screen, which will take you to the Welcome page. Then click on \"Sign up Now\" on the right side of the page.     You will be asked to enter the access code listed below, as well as some personal information. Please follow the directions to create your username and password.     Your access code is: [email or web address]  Expires: 10/5/2017  7:28 AM     Your access code will  in 90 days. If you need help or a new code, please call your [place] clinic or [phone].        Care EveryWhere ID     This is your Care EveryWhere ID. This could be used by other organizations to access your [place] medical records  [id number]         Blood Pressure from Last 3 Encounters:   17 130/84   17 128/84   17 120/76    Weight from Last 3 Encounters:   17 81.2 kg (179 lb)   17 80.3 kg (177 lb)   16 88 kg (194 lb)              We Performed the Following     REFRACTION        Primary Care Provider Office Phone # Fax #    Kath Marjan Fierro PA-C [phone]       [address]  [facility] [address]        Equal Access to Services     [facility]: Hadii aad ku hadasho Soomaali, waaxda luqadaha, qaybta kaalmada adeegyada, waxay jazmin lopez . So [facility] [phone].    ATENCIÓN: Si kimberlyla thaddeus, tiene a street disposición servicios gratuitos de asistencia lingüística. Llame al [phone].    We comply with applicable federal civil rights laws and [place] laws. We do not discriminate on the basis of race, color, national origin, age, disability sex, sexual orientation or gender identity.            Thank you!     Thank you for choosing [facility]  for your " care. Our goal is always to provide you with excellent care. Hearing back from our patients is one way we can continue to improve our services. Please take a few minutes to complete the written survey that you may receive in the mail after your visit with us. Thank you!             Your Updated Medication List - Protect others around you: Learn how to safely use, store and throw away your medicines at www.disposemymeds.org.          This list is accurate as of: 9/26/17  5:48 PM.  Always use your most recent med list.                   Brand Name Dispense Instructions for use Diagnosis    albuterol 108 (90 BASE) MCG/ACT Inhaler    VENTOLIN HFA    36 g    INHALE 2 PUFFS EVERY 6 HOURS AS NEEDED FOR SHORTNESS OF BREATH OR DYSPNEA    Chronic bronchitis with COPD (chronic obstructive pulmonary disease) (H)       aspirin 81 MG tablet     90 tablet    Take 1 tablet (81 mg) by mouth At Bedtime    Type 2 diabetes mellitus with stage 2 chronic kidney disease, without long-term current use of insulin (H)       buPROPion 150 MG 12 hr tablet    WELLBUTRIN SR    180 tablet    Take 1 tablet (150 mg) by mouth 2 times daily    Tobacco use disorder, Major depressive disorder, recurrent episode, moderate (H)       diclofenac 75 MG EC tablet    VOLTAREN    180 tablet    TAKE 1 TABLET BY MOUTH TWICE DAILY AS NEEDED FOR MODERATE PAIN.    Chronic shoulder pain, unspecified laterality       fluticasone 50 MCG/ACT spray    FLONASE    48 g    Spray 2 sprays into both nostrils daily    LPRD (laryngopharyngeal reflux disease)       gabapentin 300 MG capsule    NEURONTIN    810 capsule    TAKE 3 CAPSULES BY MOUTH THREE TIMES DAILY    Chronic shoulder pain, unspecified laterality       hydrOXYzine 25 MG tablet    ATARAX    45 tablet    Take 1-2 tablets (25-50 mg) by mouth every 6 hours as needed for anxiety    Anxiety       lidocaine 5 % Patch    LIDODERM    90 patch    APPLY 1 PATCH ONTO SKIN. WEAR FOR 12 HOURS THEN PATCH FREE FOR 12 HOURS     Chronic shoulder pain, unspecified laterality       loratadine 10 MG tablet    CLARITIN    90 tablet    Take 1 tablet (10 mg) by mouth daily    LPRD (laryngopharyngeal reflux disease)       metFORMIN 500 MG 24 hr tablet    GLUCOPHAGE-XR    360 tablet    Take 4 tablets (2,000 mg) by mouth daily (with dinner)    Type 2 diabetes mellitus with stage 2 chronic kidney disease, without long-term current use of insulin (H)       methocarbamol 500 MG tablet    ROBAXIN    270 tablet    Take 2 tablets (1,000 mg) by mouth 3 times daily as needed for muscle spasms    Chronic shoulder pain, unspecified laterality       mometasone-formoterol 200-5 MCG/ACT oral inhaler    DULERA    3 Inhaler    Inhale 2 puffs into the lungs 2 times daily    Chronic bronchitis with COPD (chronic obstructive pulmonary disease) (H)       omeprazole 20 MG CR capsule    priLOSEC    90 capsule    Take 1 capsule (20 mg) by mouth daily as needed    LPRD (laryngopharyngeal reflux disease)       * order for DME     1 Device    1 glucometer per insurance formulary    Type 2 diabetes mellitus without retinopathy (H)       * order for DME     3 Month    Diabetic test strips and lancets per insurance formulary Check blood sugar once per day    Type 2 diabetes mellitus without retinopathy (H)       oxybutynin 5 MG 24 hr tablet    DITROPAN-XL    30 tablet    Take 1 tablet (5 mg) by mouth daily    Urinary incontinence without sensory awareness       simvastatin 40 MG tablet    ZOCOR    90 tablet    Take 1 tablet (40 mg) by mouth At Bedtime    Hyperlipidemia LDL goal <100       traZODone 100 MG tablet    DESYREL    180 tablet    Take 2 tablets (200 mg) by mouth At Bedtime    Major depressive disorder, recurrent episode, moderate (H)       * Notice:  This list has 2 medication(s) that are the same as other medications prescribed for you. Read the directions carefully, and ask your doctor or other care provider to review them with you.

## 2017-09-26 NOTE — PATIENT INSTRUCTIONS
Recommend new glasses.    Return 2/18 for a complete eye exam or sooner if needed.    Efrain Garcia, OD

## 2017-09-26 NOTE — PROGRESS NOTES
Chief Complaint   Patient presents with     Eye Problem     type 2 diabetes- needs new glasses       Last exam 1/17.  No complaints- just wants eyes checked before new glasses.    Hemoglobin A1C   Date Value Ref Range Status   07/07/2017 6.3 (H) 4.3 - 6.0 % Final         Medical, surgical and family histories reviewed and updated 9/26/2017.       OBJECTIVE: See Ophthalmology exam    ASSESSMENT:    ICD-10-CM    1. Type 2 diabetes mellitus without retinopathy (H) E11.9 REFRACTION   2. Regular astigmatism of both eyes H52.223 REFRACTION   3. Hyperopia, bilateral H52.03 REFRACTION   4. Presbyopia H52.4 REFRACTION      PLAN:     Patient Instructions   Recommend new glasses.    Return 2/18 for a complete eye exam or sooner if needed.    Efrain Garcia, OD

## 2017-09-27 ENCOUNTER — OFFICE VISIT (OUTPATIENT)
Dept: FAMILY MEDICINE | Facility: CLINIC | Age: 57
End: 2017-09-27
Payer: COMMERCIAL

## 2017-09-27 VITALS
BODY MASS INDEX: 30.86 KG/M2 | DIASTOLIC BLOOD PRESSURE: 92 MMHG | HEIGHT: 65 IN | SYSTOLIC BLOOD PRESSURE: 145 MMHG | TEMPERATURE: 97.3 F | WEIGHT: 185.2 LBS

## 2017-09-27 DIAGNOSIS — F99 INSOMNIA DUE TO OTHER MENTAL DISORDER: ICD-10-CM

## 2017-09-27 DIAGNOSIS — F10.11 H/O ALCOHOL ABUSE: ICD-10-CM

## 2017-09-27 DIAGNOSIS — R03.0 ELEVATED BLOOD PRESSURE READING WITHOUT DIAGNOSIS OF HYPERTENSION: ICD-10-CM

## 2017-09-27 DIAGNOSIS — F51.05 INSOMNIA DUE TO OTHER MENTAL DISORDER: ICD-10-CM

## 2017-09-27 DIAGNOSIS — G89.4 CHRONIC PAIN SYNDROME: ICD-10-CM

## 2017-09-27 DIAGNOSIS — F41.1 GAD (GENERALIZED ANXIETY DISORDER): Primary | ICD-10-CM

## 2017-09-27 PROCEDURE — 99214 OFFICE O/P EST MOD 30 MIN: CPT | Performed by: PHYSICIAN ASSISTANT

## 2017-09-27 RX ORDER — BUSPIRONE HYDROCHLORIDE 5 MG/1
TABLET ORAL
Qty: 150 TABLET | Refills: 0 | Status: SHIPPED | OUTPATIENT
Start: 2017-09-27 | End: 2017-11-22

## 2017-09-27 NOTE — MR AVS SNAPSHOT
"              After Visit Summary   9/27/2017    Pily Roche    MRN: [id number]           Patient Information     Date Of Birth          1960        Visit Information        Provider Department      9/27/2017 9:00 AM Kath Fierro PA-C [facility]        Today's Diagnoses     GUDELIA (generalized anxiety disorder)    -  1      Care Instructions    Start monitoring blood pressures at home.  Send me a message in about 2 weeks with the readings (goal is < 140/90 mmHg).   We may want to start a medication if this continues to be high.               Follow-ups after your visit        Your next 10 appointments already scheduled     Oct 06, 2017  8:00 AM CDT   Return Visit with Antonio Diallo MD   [facility] [phone]              Who to contact     Normal or non-critical lab and imaging results will be communicated to you by MyChart, letter or phone within 4 business days after the clinic has received the results. If you do not hear from us within 7 days, please contact the clinic through MyChart or phone. If you have a critical or abnormal lab result, we will notify you by phone as soon as possible.  Submit refill requests through [employer] or call your pharmacy and they will forward the refill request to us. Please allow 3 business days for your refill to be completed.          If you need to speak with a  for additional information , please call: [phone]           Additional Information About Your Visit        [employer] Information     [employer] lets you send messages to your doctor, view your test results, renew your prescriptions, schedule appointments and more. To sign up, go to [email or web address] . Click on \"Log in\" on the left side of the screen, which will take you to the Welcome page. Then click on \"Sign up Now\" on the right side of the page.     You will be asked " "to enter the access code listed below, as well as some personal information. Please follow the directions to create your username and password.     Your access code is: [email or web address]  Expires: 10/5/2017  7:28 AM     Your access code will  in 90 days. If you need help or a new code, please call your [facility] or [phone].        Care EveryWhere ID     This is your Care EveryWhere ID. This could be used by other organizations to access your [place] medical records  [id number]        Your Vitals Were     Temperature Height Breastfeeding? BMI (Body Mass Index)          97.3  F (36.3  C) (Tympanic) 5' 4.5\" (1.638 m) No 31.3 kg/m2         Blood Pressure from Last 3 Encounters:   17 140/80   17 130/84   17 128/84    Weight from Last 3 Encounters:   17 185 lb 3.2 oz (84 kg)   17 179 lb (81.2 kg)   17 177 lb (80.3 kg)              Today, you had the following     No orders found for display         Today's Medication Changes          These changes are accurate as of: 17  9:36 AM.  If you have any questions, ask your nurse or doctor.               Start taking these medicines.        Dose/Directions    busPIRone 5 MG tablet   Commonly known as:  BUSPAR   Used for:  GUDELIA (generalized anxiety disorder)   Started by:  Kath Fierro PA-C        Start at 5 mg twice daily for 3 days, then 7.5 mg (1.5 tabs) twice daily for 3 days, then 10 mg (2 tabs) twice daily for and stay at that dose   Quantity:  150 tablet   Refills:  0            Where to get your medicines      Some of these will need a paper prescription and others can be bought over the counter.  Ask your nurse if you have questions.     Bring a paper prescription for each of these medications     busPIRone 5 MG tablet                Primary Care Provider Office Phone # Fax #    Kath Fierro PA-C [phone] [address]  [facility] [address]        Equal Access to Services     " KARISSA CARDENAS : Hadii aad ku geo Fermin, waaxda luqadaha, qaybta kaalmada adebelinda, lincoln jazmin joshuamoe acosta marciekelsey lopez . So [facility] [phone].    ATENCIÓN: Si habla español, tiene a street disposición servicios gratuitos de asistencia lingüística. Llame al [phone].    We comply with applicable federal civil rights laws and [place] laws. We do not discriminate on the basis of race, color, national origin, age, disability sex, sexual orientation or gender identity.            Thank you!     Thank you for choosing [facility]  for your care. Our goal is always to provide you with excellent care. Hearing back from our patients is one way we can continue to improve our services. Please take a few minutes to complete the written survey that you may receive in the mail after your visit with us. Thank you!             Your Updated Medication List - Protect others around you: Learn how to safely use, store and throw away your medicines at www.disposemymeds.org.          This list is accurate as of: 9/27/17  9:36 AM.  Always use your most recent med list.                   Brand Name Dispense Instructions for use Diagnosis    albuterol 108 (90 BASE) MCG/ACT Inhaler    VENTOLIN HFA    36 g    INHALE 2 PUFFS EVERY 6 HOURS AS NEEDED FOR SHORTNESS OF BREATH OR DYSPNEA    Chronic bronchitis with COPD (chronic obstructive pulmonary disease) (H)       aspirin 81 MG tablet     90 tablet    Take 1 tablet (81 mg) by mouth At Bedtime    Type 2 diabetes mellitus with stage 2 chronic kidney disease, without long-term current use of insulin (H)       buPROPion 150 MG 12 hr tablet    WELLBUTRIN SR    180 tablet    Take 1 tablet (150 mg) by mouth 2 times daily    Tobacco use disorder, Major depressive disorder, recurrent episode, moderate (H)       busPIRone 5 MG tablet    BUSPAR    150 tablet    Start at 5 mg twice daily for 3 days, then 7.5 mg (1.5 tabs) twice daily for 3 days, then 10 mg (2 tabs) twice daily  for and stay at that dose    GUDELIA (generalized anxiety disorder)       diclofenac 75 MG EC tablet    VOLTAREN    180 tablet    TAKE 1 TABLET BY MOUTH TWICE DAILY AS NEEDED FOR MODERATE PAIN.    Chronic shoulder pain, unspecified laterality       fluticasone 50 MCG/ACT spray    FLONASE    48 g    Spray 2 sprays into both nostrils daily    LPRD (laryngopharyngeal reflux disease)       gabapentin 300 MG capsule    NEURONTIN    810 capsule    TAKE 3 CAPSULES BY MOUTH THREE TIMES DAILY    Chronic shoulder pain, unspecified laterality       hydrOXYzine 25 MG tablet    ATARAX    45 tablet    Take 1-2 tablets (25-50 mg) by mouth every 6 hours as needed for anxiety    Anxiety       lidocaine 5 % Patch    LIDODERM    90 patch    APPLY 1 PATCH ONTO SKIN. WEAR FOR 12 HOURS THEN PATCH FREE FOR 12 HOURS    Chronic shoulder pain, unspecified laterality       loratadine 10 MG tablet    CLARITIN    90 tablet    Take 1 tablet (10 mg) by mouth daily    LPRD (laryngopharyngeal reflux disease)       metFORMIN 500 MG 24 hr tablet    GLUCOPHAGE-XR    360 tablet    Take 4 tablets (2,000 mg) by mouth daily (with dinner)    Type 2 diabetes mellitus with stage 2 chronic kidney disease, without long-term current use of insulin (H)       methocarbamol 500 MG tablet    ROBAXIN    270 tablet    Take 2 tablets (1,000 mg) by mouth 3 times daily as needed for muscle spasms    Chronic shoulder pain, unspecified laterality       mometasone-formoterol 200-5 MCG/ACT oral inhaler    DULERA    3 Inhaler    Inhale 2 puffs into the lungs 2 times daily    Chronic bronchitis with COPD (chronic obstructive pulmonary disease) (H)       omeprazole 20 MG CR capsule    priLOSEC    90 capsule    Take 1 capsule (20 mg) by mouth daily as needed    LPRD (laryngopharyngeal reflux disease)       * order for DME     1 Device    1 glucometer per insurance formulary    Type 2 diabetes mellitus without retinopathy (H)       * order for DME     3 Month    Diabetic test strips  and lancets per insurance formulary Check blood sugar once per day    Type 2 diabetes mellitus without retinopathy (H)       oxybutynin 5 MG 24 hr tablet    DITROPAN-XL    30 tablet    Take 1 tablet (5 mg) by mouth daily    Urinary incontinence without sensory awareness       simvastatin 40 MG tablet    ZOCOR    90 tablet    Take 1 tablet (40 mg) by mouth At Bedtime    Hyperlipidemia LDL goal <100       traZODone 100 MG tablet    DESYREL    180 tablet    Take 2 tablets (200 mg) by mouth At Bedtime    Major depressive disorder, recurrent episode, moderate (H)       * Notice:  This list has 2 medication(s) that are the same as other medications prescribed for you. Read the directions carefully, and ask your doctor or other care provider to review them with you.

## 2017-09-27 NOTE — NURSING NOTE
"Chief Complaint   Patient presents with     Recheck Medication       Initial /80  Temp 97.3  F (36.3  C) (Tympanic)  Ht 5' 4.5\" (1.638 m)  Wt 185 lb 3.2 oz (84 kg)  Breastfeeding? No  BMI 31.3 kg/m2 Estimated body mass index is 31.3 kg/(m^2) as calculated from the following:    Height as of this encounter: 5' 4.5\" (1.638 m).    Weight as of this encounter: 185 lb 3.2 oz (84 kg).  Medication Reconciliation: complete     Camille Dawn MA      "

## 2017-09-27 NOTE — PROGRESS NOTES
SUBJECTIVE:   Pily Roche is a 56 year old female who presents to clinic today for the following health issues:      Medication Followup of Hydroxyzine 25mg    Taking Medication as prescribed: yes    Side Effects:  None    Medication Helping Symptoms:  NO     2 tabs last night and she could not fall asleep. Still taking trazodone at bedtime for sleep.    Anxiety levels are ok during the day, however her anxiety centers around her lack of sleep and pain that wakes her up at night.  She would like to go back on the klonopin as this worked very well for her in the past, however she has failed her drug screen in the past therefore I am trying to stay away from benzo's and narcotics.     Drinking alcohol sparingly (beer/wine)      Ear Problem- 1 month, Pt had a q-tip in left ear and bumped it and she states there was blood at the site.     She also c/o mild cough for the past few weeks.  She will cough up yellow mucus at times.  No fevers . No SOB . She does still smoke.           Problem list and histories reviewed & adjusted, as indicated.  Additional history: as documented    Patient Active Problem List   Diagnosis     Insomnia     Chronic low back pain     Elevated liver enzymes     Moderate major depression (H)     Disorder of bursae and tendons in shoulder region     Alcohol abuse     Chronic bronchitis with COPD (chronic obstructive pulmonary disease) (H)     Advance care planning     Type 2 diabetes, HbA1c goal < 7% (H)     Hyperlipidemia LDL goal <100     Impingement syndrome, shoulder     Rotator cuff tear     AC separation     Tobacco use disorder     Anxiety     Chronic shoulder pain, unspecified laterality     CKD (chronic kidney disease) stage 2, GFR 60-89 ml/min     Macular degeneration     Type II diabetes mellitus with stage 2 chronic kidney disease (H)     Non morbid obesity due to excess calories     Menopausal symptoms     Chronic bilateral low back pain without sciatica     Chronic pain  syndrome     Methamphetamine use     Type 2 diabetes mellitus without retinopathy (H)     Past Surgical History:   Procedure Laterality Date     C SHOULDER SURG PROC UNLISTED  1992    X 3 (broken clavicle and rotator cuff tear with impingement     HC SACROPLASTY  1990's    Herniated L4-L5 X 2     HYSTERECTOMY, PAP NO LONGER INDICATED  2005     HYSTERECTOMY, VAGINAL  6/28/05    Ovaries in Place       Social History   Substance Use Topics     Smoking status: Current Every Day Smoker     Packs/day: 1.50     Years: 30.00     Types: Cigarettes     Smokeless tobacco: Never Used      Comment: 1/2 pack to 1 ppd, has an ecig     Alcohol use 3.0 - 6.0 oz/week     5 - 10 Standard drinks or equivalent per week      Comment: 2beers every few fews     Family History   Problem Relation Age of Onset     DIABETES Mother      Hypertension Mother      Gynecology Mother      Arthritis Mother      Thyroid Disease Mother      Allergies Mother      Lipids Father      HEART DISEASE Father      C.A.D. Father      Hypertension Maternal Grandmother      Arthritis Maternal Grandmother      CANCER Maternal Grandmother      CANCER Maternal Grandfather      C.A.D. Maternal Grandfather      Asthma Paternal Grandmother      C.A.D. Paternal Grandmother      CEREBROVASCULAR DISEASE Paternal Grandfather      Alzheimer Disease Paternal Grandfather      Arthritis Paternal Grandfather      C.A.D. Paternal Grandfather      Cardiovascular Paternal Grandfather      Circulatory Paternal Grandfather      Glaucoma No family hx of      Macular Degeneration No family hx of          Current Outpatient Prescriptions   Medication Sig Dispense Refill     busPIRone (BUSPAR) 5 MG tablet Start at 5 mg twice daily for 3 days, then 7.5 mg (1.5 tabs) twice daily for 3 days, then 10 mg (2 tabs) twice daily for and stay at that dose 150 tablet 0     oxybutynin (DITROPAN-XL) 5 MG 24 hr tablet Take 1 tablet (5 mg) by mouth daily 30 tablet 1     hydrOXYzine (ATARAX) 25 MG  tablet Take 1-2 tablets (25-50 mg) by mouth every 6 hours as needed for anxiety 45 tablet 0     loratadine (CLARITIN) 10 MG tablet Take 1 tablet (10 mg) by mouth daily 90 tablet 1     omeprazole (PRILOSEC) 20 MG CR capsule Take 1 capsule (20 mg) by mouth daily as needed 90 capsule 1     methocarbamol (ROBAXIN) 500 MG tablet Take 2 tablets (1,000 mg) by mouth 3 times daily as needed for muscle spasms 270 tablet 1     traZODone (DESYREL) 100 MG tablet Take 2 tablets (200 mg) by mouth At Bedtime 180 tablet 1     gabapentin (NEURONTIN) 300 MG capsule TAKE 3 CAPSULES BY MOUTH THREE TIMES DAILY 810 capsule 1     mometasone-formoterol (DULERA) 200-5 MCG/ACT oral inhaler Inhale 2 puffs into the lungs 2 times daily 3 Inhaler 1     simvastatin (ZOCOR) 40 MG tablet Take 1 tablet (40 mg) by mouth At Bedtime 90 tablet 1     metFORMIN (GLUCOPHAGE-XR) 500 MG 24 hr tablet Take 4 tablets (2,000 mg) by mouth daily (with dinner) 360 tablet 1     diclofenac (VOLTAREN) 75 MG EC tablet TAKE 1 TABLET BY MOUTH TWICE DAILY AS NEEDED FOR MODERATE PAIN. 180 tablet 1     buPROPion (WELLBUTRIN SR) 150 MG 12 hr tablet Take 1 tablet (150 mg) by mouth 2 times daily 180 tablet 1     albuterol (VENTOLIN HFA) 108 (90 BASE) MCG/ACT Inhaler INHALE 2 PUFFS EVERY 6 HOURS AS NEEDED FOR SHORTNESS OF BREATH OR DYSPNEA 36 g 1     fluticasone (FLONASE) 50 MCG/ACT spray Spray 2 sprays into both nostrils daily 48 g 1     lidocaine (LIDODERM) 5 % Patch APPLY 1 PATCH ONTO SKIN. WEAR FOR 12 HOURS THEN PATCH FREE FOR 12 HOURS 90 patch 1     order for DME 1 glucometer per insurance formulary 1 Device 0     order for DME Diabetic test strips and lancets per insurance formulary Check blood sugar once per day 3 Month 1     aspirin 81 MG tablet Take 1 tablet (81 mg) by mouth At Bedtime (Patient not taking: Reported on 9/27/2017) 90 tablet 3     BP Readings from Last 3 Encounters:   09/27/17 140/80   08/25/17 130/84   07/12/17 128/84    Wt Readings from Last 3  "Encounters:   09/27/17 185 lb 3.2 oz (84 kg)   07/12/17 179 lb (81.2 kg)   07/07/17 177 lb (80.3 kg)                        Reviewed and updated as needed this visit by clinical staff       Reviewed and updated as needed this visit by Provider         ROS:  Constitutional, HEENT, cardiovascular, pulmonary, GI, , musculoskeletal, neuro, skin, endocrine and psych systems are negative, except as otherwise noted.      OBJECTIVE:   /80  Temp 97.3  F (36.3  C) (Tympanic)  Ht 5' 4.5\" (1.638 m)  Wt 185 lb 3.2 oz (84 kg)  Breastfeeding? No  BMI 31.3 kg/m2  Body mass index is 31.3 kg/(m^2).  GENERAL: healthy, alert and no distress  HENT: ear canals and TM's normal, nose and mouth without ulcers or lesions  NECK: no adenopathy, no asymmetry, masses, or scars and thyroid normal to palpation  RESP: lungs clear to auscultation - no rales, rhonchi or wheezes  CV: regular rate and rhythm, normal S1 S2, no S3 or S4, no murmur, click or rub, no peripheral edema and peripheral pulses strong  MS: no gross musculoskeletal defects noted, no edema    Diagnostic Test Results:  none     ASSESSMENT/PLAN:             1. GUDELIA (generalized anxiety disorder)  Hydroxyzine ineffective, I hesitate to push the dose to high with the other sedative meds she is already on.    Will try buspar and taper up as below.    May consider going back to Klonopin however would need regular drug screens while on this medication if we go that route.     - busPIRone (BUSPAR) 5 MG tablet; Start at 5 mg twice daily for 3 days, then 7.5 mg (1.5 tabs) twice daily for 3 days, then 10 mg (2 tabs) twice daily for and stay at that dose  Dispense: 150 tablet; Refill: 0    2. H/O alcohol abuse  Drinking sparingly, will continue to monitor.      3. Chronic pain syndrome  Unchanged/stable, continue with current meds.      4. Insomnia due to other mental disorder  Due to anxiety.  On trazodone.     5.  Elevated BP without HTN diagnosis.   Reviewed BP and medication " options.  She would benefit from an ACE-I, however she is not interested in starting this today.  Home BP log given and she will notify me if BP > 140/90 and we'll start an ACE-I.  Not a good candidate for PGen as she should be on an ACE given her diabetes.     BP Readings from Last 6 Encounters:   09/27/17 (!) 145/92   08/25/17 130/84   07/12/17 128/84   07/07/17 120/76   12/21/16 116/78   09/19/16 112/60         FUTURE APPOINTMENTS:       - Follow-up visit in 2-3 months.     Kath Fierro PA-C  [facility]

## 2017-09-27 NOTE — PATIENT INSTRUCTIONS
Start monitoring blood pressures at home.  Send me a message in about 2 weeks with the readings (goal is < 140/90 mmHg).   We may want to start a medication if this continues to be high.

## 2017-10-24 ENCOUNTER — TELEPHONE (OUTPATIENT)
Dept: FAMILY MEDICINE | Facility: CLINIC | Age: 57
End: 2017-10-24

## 2017-10-24 NOTE — TELEPHONE ENCOUNTER
Patient called and said she has had the crud for 4 weeks and wants a z-jeferson sent in for her.  I made the patient aware that our policy is she would have to be seen but she still wanted me to send the message to the nurse.    Vilma [facility]

## 2017-10-24 NOTE — TELEPHONE ENCOUNTER
Patient reports that she has has a phlegmy cough for the past 4 weeks. She is coughing up yellow/green mucus. She has chest congestion and some sinus congestion. Patient denies temp, nausea, headache. She is a smoker. Advised patient to be seen. Patient would rather not come in if she does not have to. Offered her a telephone visit. Discussed charges and patient is ok with this. Appointment made.  Thais Gonzalez RN

## 2017-10-25 ENCOUNTER — TELEPHONE (OUTPATIENT)
Dept: FAMILY MEDICINE | Facility: CLINIC | Age: 57
End: 2017-10-25

## 2017-10-25 ENCOUNTER — OFFICE VISIT (OUTPATIENT)
Dept: FAMILY MEDICINE | Facility: CLINIC | Age: 57
End: 2017-10-25
Payer: COMMERCIAL

## 2017-10-25 DIAGNOSIS — J44.89 CHRONIC BRONCHITIS WITH COPD (CHRONIC OBSTRUCTIVE PULMONARY DISEASE) (H): ICD-10-CM

## 2017-10-25 DIAGNOSIS — R05.9 COUGH: Primary | ICD-10-CM

## 2017-10-25 PROCEDURE — 98966 PH1 ASSMT&MGMT NQHP 5-10: CPT | Performed by: PHYSICIAN ASSISTANT

## 2017-10-25 RX ORDER — AZITHROMYCIN 250 MG/1
TABLET, FILM COATED ORAL
Qty: 6 TABLET | Refills: 0 | Status: SHIPPED | OUTPATIENT
Start: 2017-10-25 | End: 2017-12-04

## 2017-10-25 RX ORDER — ALBUTEROL SULFATE 90 UG/1
AEROSOL, METERED RESPIRATORY (INHALATION)
Qty: 36 G | Refills: 1 | Status: SHIPPED | OUTPATIENT
Start: 2017-10-25 | End: 2019-06-10

## 2017-10-25 RX ORDER — BENZONATATE 100 MG/1
100 CAPSULE ORAL 3 TIMES DAILY PRN
Qty: 42 CAPSULE | Refills: 0 | Status: SHIPPED | OUTPATIENT
Start: 2017-10-25 | End: 2017-12-04

## 2017-10-25 RX ORDER — BENZONATATE 200 MG/1
200 CAPSULE ORAL 3 TIMES DAILY PRN
Qty: 21 CAPSULE | Refills: 0 | Status: SHIPPED | OUTPATIENT
Start: 2017-10-25 | End: 2017-12-04

## 2017-10-25 NOTE — TELEPHONE ENCOUNTER
I have sent a different dose to the pharmacy, she can see if that would be covered better.  Otherwise, for the cough, I suggest OTC Robitussin or Delsym.     Kath Fierro PA-C

## 2017-10-25 NOTE — PROGRESS NOTES
"Pily Roche is a 56 year old female who is being evaluated via a telephone visit.      The patient has been notified of following:     \"This telephone visit will be conducted via a call between you and your physician/provider. We have found that certain health care needs can be provided without the need for a physical exam.  This service lets us provide the care you need with a short phone conversation.  If a prescription is necessary we can send it directly to your pharmacy.  If lab work is needed we can place an order for that and you can then stop by our lab to have the test done at a later time.    We will bill your insurance company for this service.  Please check with your medical insurance if this type of visit is covered. You may be responsible for the cost of this type of visit if insurance coverage is denied.  The typical cost is $30 (10min), $59 (11-20min) and $85 (21-30min).  Most often these visits are shorter than 10 minutes.    If during the course of the call the physician/provider feels a telephone visit is not appropriate, you will not be charged for this service.\"       Consent has been obtained for this service by 2 care team members: yes. See the scanned image in the medical record.    Pily Roche complains of  Cough (x 4 weeks )      I have reviewed and updated the patient's Past Medical History, Social History, Family History and Medication List.    ALLERGIES  Quetiapine; Seroquel [quetiapine fumarate]; Ambien [zolpidem tartrate]; and Zolpidem    Latha Boles CMA    (MA signature)    Additional provider notes:   Start:  11:42 am  End:  11:48 am    She c/o cough and runny nose x 4 weeks.  Cough is productive of green mucus . She has been using her inhaler with some improvement, however mucus is very thick.  She denies any SOB.  Congestion is worse at night.  She is using flonase, with some improvement.      Still smoking, at about 1 pack per day.      Assessment/Plan:  (J44.9) " Chronic bronchitis with COPD (chronic obstructive pulmonary disease)  Comment: BRONCHITIS    * Antibiotics indicated, see orders.  * I discussed the pathophysiology of bronchitis.  I reviewed the risks and benefits of various over the counter and prescription medications.  Additionally, we reviewed the infectious nature of this condition and techniques to minimize transmission and future infections.    Patient advised to follow up in clinic  if symptoms worsen or fail to improve as anticipated.     Plan: benzonatate (TESSALON) 200 MG capsule,         azithromycin (ZITHROMAX) 250 MG tablet,         albuterol (VENTOLIN HFA) 108 (90 BASE) MCG/ACT         Inhaler                I have reviewed the note as documented above.  This accurately captures the substance of my conversation with the patient,  Kath Fierro PA-C     Total time of call between patient and provider was 5 minutes

## 2017-10-25 NOTE — TELEPHONE ENCOUNTER
Pt is calling and states that her benzonatate (TESSALON) 200 MG capsule  are not covered by insurance, please send new medication over.     Reyna Osorio, [place]

## 2017-10-25 NOTE — TELEPHONE ENCOUNTER
Pt called and left msg that her insurance wont cover her  Medication, I called pt to find out which med she is referring too. Asked her to return my call.     Reyna Osorio, [place]

## 2017-10-25 NOTE — MR AVS SNAPSHOT
"              After Visit Summary   10/25/2017    Pily Roche    MRN: [id number]           Patient Information     Date Of Birth          1960        Visit Information        Provider Department      10/25/2017 11:40 AM Kath Fierro PA-C [facility]        Today's Diagnoses     Chronic bronchitis with COPD (chronic obstructive pulmonary disease)           Follow-ups after your visit        Who to contact     Normal or non-critical lab and imaging results will be communicated to you by [employer], letter or phone within 4 business days after the clinic has received the results. If you do not hear from us within 7 days, please contact the clinic through [employer] or phone. If you have a critical or abnormal lab result, we will notify you by phone as soon as possible.  Submit refill requests through [employer] or call your pharmacy and they will forward the refill request to us. Please allow 3 business days for your refill to be completed.          If you need to speak with a  for additional information , please call: [phone]           Additional Information About Your Visit        [employer] Information     [employer] lets you send messages to your doctor, view your test results, renew your prescriptions, schedule appointments and more. To sign up, go to [email or web address] . Click on \"Log in\" on the left side of the screen, which will take you to the Welcome page. Then click on \"Sign up Now\" on the right side of the page.     You will be asked to enter the access code listed below, as well as some personal information. Please follow the directions to create your username and password.     Your access code is: [email or web address]  Expires: 2018 12:50 PM     Your access code will  in 90 days. If you need help or a new code, please call your [facility] or [phone].        Care EveryWhere ID     This is your Care EveryWhere ID. This could be used by other " organizations to access your [place] medical records  [id number]         Blood Pressure from Last 3 Encounters:   09/27/17 (!) 145/92   08/25/17 130/84   07/12/17 128/84    Weight from Last 3 Encounters:   09/27/17 185 lb 3.2 oz (84 kg)   07/12/17 179 lb (81.2 kg)   07/07/17 177 lb (80.3 kg)              Today, you had the following     No orders found for display         Today's Medication Changes          These changes are accurate as of: 10/25/17 11:59 PM.  If you have any questions, ask your nurse or doctor.               Start taking these medicines.        Dose/Directions    azithromycin 250 MG tablet   Commonly known as:  ZITHROMAX   Used for:  Chronic bronchitis with COPD (chronic obstructive pulmonary disease) (H)   Started by:  Kath Fierro PA-C        Take 2 tabs on day 1, then 1 tab on days 2-5   Quantity:  6 tablet   Refills:  0       * benzonatate 200 MG capsule   Commonly known as:  TESSALON   Used for:  Chronic bronchitis with COPD (chronic obstructive pulmonary disease) (H)   Started by:  Kath Fierro PA-C        Dose:  200 mg   Take 1 capsule (200 mg) by mouth 3 times daily as needed for cough   Quantity:  21 capsule   Refills:  0       * benzonatate 100 MG capsule   Commonly known as:  TESSALON PERLES   Used for:  Cough   Started by:  Kath Fierro PA-C        Dose:  100 mg   Take 1 capsule (100 mg) by mouth 3 times daily as needed for cough   Quantity:  42 capsule   Refills:  0       * Notice:  This list has 2 medication(s) that are the same as other medications prescribed for you. Read the directions carefully, and ask your doctor or other care provider to review them with you.         Where to get your medicines      These medications were sent to [facility] [address] [facility], [place] - [address] [facility] [address] [place] [facility]  [address] [facility] [address]     Phone:  [phone]     albuterol 108 (90 BASE) MCG/ACT Inhaler     azithromycin 250 MG tablet    benzonatate 100 MG capsule    benzonatate 200 MG capsule                Primary Care Provider Office Phone # Fax #    Kath Fierro PA-C [phone] [address]  [place] [facility] [address]        Equal Access to Services     [facility] THERESA : Hadii aad ku hadasho Soomaali, waaxda luqadaha, qaybta kaalmada adeegyada, waxay kayliein haymessimoe murrellsybilkelsey wells. So [facility] [phone].    ATENCIÓN: Si habla español, tiene a street disposición servicios gratuitos de asistencia lingüística. Llame al [phone].    We comply with applicable federal civil rights laws and [place] laws. We do not discriminate on the basis of race, color, national origin, age, disability, sex, sexual orientation, or gender identity.            Thank you!     Thank you for choosing [facility]  for your care. Our goal is always to provide you with excellent care. Hearing back from our patients is one way we can continue to improve our services. Please take a few minutes to complete the written survey that you may receive in the mail after your visit with us. Thank you!             Your Updated Medication List - Protect others around you: Learn how to safely use, store and throw away your medicines at www.disposemymeds.org.          This list is accurate as of: 10/25/17 11:59 PM.  Always use your most recent med list.                   Brand Name Dispense Instructions for use Diagnosis    albuterol 108 (90 BASE) MCG/ACT Inhaler    VENTOLIN HFA    36 g    INHALE 2 PUFFS EVERY 6 HOURS AS NEEDED FOR SHORTNESS OF BREATH OR DYSPNEA    Chronic bronchitis with COPD (chronic obstructive pulmonary disease) (H)       aspirin 81 MG tablet     90 tablet    Take 1 tablet (81 mg) by mouth At Bedtime    Type 2 diabetes mellitus with stage 2 chronic kidney disease, without long-term current use of insulin (H)       azithromycin 250 MG tablet    ZITHROMAX    6 tablet    Take 2 tabs on day 1,  then 1 tab on days 2-5    Chronic bronchitis with COPD (chronic obstructive pulmonary disease) (H)       * benzonatate 200 MG capsule    TESSALON    21 capsule    Take 1 capsule (200 mg) by mouth 3 times daily as needed for cough    Chronic bronchitis with COPD (chronic obstructive pulmonary disease) (H)       * benzonatate 100 MG capsule    TESSALON PERLES    42 capsule    Take 1 capsule (100 mg) by mouth 3 times daily as needed for cough    Cough       buPROPion 150 MG 12 hr tablet    WELLBUTRIN SR    180 tablet    Take 1 tablet (150 mg) by mouth 2 times daily    Tobacco use disorder, Major depressive disorder, recurrent episode, moderate (H)       busPIRone 5 MG tablet    BUSPAR    150 tablet    Start at 5 mg twice daily for 3 days, then 7.5 mg (1.5 tabs) twice daily for 3 days, then 10 mg (2 tabs) twice daily for and stay at that dose    GUDELIA (generalized anxiety disorder)       diclofenac 75 MG EC tablet    VOLTAREN    180 tablet    TAKE 1 TABLET BY MOUTH TWICE DAILY AS NEEDED FOR MODERATE PAIN.    Chronic shoulder pain, unspecified laterality       fluticasone 50 MCG/ACT spray    FLONASE    48 g    Spray 2 sprays into both nostrils daily    LPRD (laryngopharyngeal reflux disease)       gabapentin 300 MG capsule    NEURONTIN    810 capsule    TAKE 3 CAPSULES BY MOUTH THREE TIMES DAILY    Chronic shoulder pain, unspecified laterality       lidocaine 5 % Patch    LIDODERM    90 patch    APPLY 1 PATCH ONTO SKIN. WEAR FOR 12 HOURS THEN PATCH FREE FOR 12 HOURS    Chronic shoulder pain, unspecified laterality       loratadine 10 MG tablet    CLARITIN    90 tablet    Take 1 tablet (10 mg) by mouth daily    LPRD (laryngopharyngeal reflux disease)       metFORMIN 500 MG 24 hr tablet    GLUCOPHAGE-XR    360 tablet    Take 4 tablets (2,000 mg) by mouth daily (with dinner)    Type 2 diabetes mellitus with stage 2 chronic kidney disease, without long-term current use of insulin (H)       methocarbamol 500 MG tablet     ROBAXIN    270 tablet    Take 2 tablets (1,000 mg) by mouth 3 times daily as needed for muscle spasms    Chronic shoulder pain, unspecified laterality       mometasone-formoterol 200-5 MCG/ACT oral inhaler    DULERA    3 Inhaler    Inhale 2 puffs into the lungs 2 times daily    Chronic bronchitis with COPD (chronic obstructive pulmonary disease) (H)       omeprazole 20 MG CR capsule    priLOSEC    90 capsule    Take 1 capsule (20 mg) by mouth daily as needed    LPRD (laryngopharyngeal reflux disease)       * order for DME     1 Device    1 glucometer per insurance formulary    Type 2 diabetes mellitus without retinopathy (H)       * order for DME     3 Month    Diabetic test strips and lancets per insurance formulary Check blood sugar once per day    Type 2 diabetes mellitus without retinopathy (H)       oxybutynin 5 MG 24 hr tablet    DITROPAN-XL    30 tablet    Take 1 tablet (5 mg) by mouth daily    Urinary incontinence without sensory awareness       simvastatin 40 MG tablet    ZOCOR    90 tablet    Take 1 tablet (40 mg) by mouth At Bedtime    Hyperlipidemia LDL goal <100       traZODone 100 MG tablet    DESYREL    180 tablet    Take 2 tablets (200 mg) by mouth At Bedtime    Major depressive disorder, recurrent episode, moderate (H)       * Notice:  This list has 4 medication(s) that are the same as other medications prescribed for you. Read the directions carefully, and ask your doctor or other care provider to review them with you.

## 2017-11-22 DIAGNOSIS — F41.1 GAD (GENERALIZED ANXIETY DISORDER): ICD-10-CM

## 2017-11-24 RX ORDER — BUSPIRONE HYDROCHLORIDE 5 MG/1
10 TABLET ORAL 2 TIMES DAILY
Qty: 360 TABLET | Refills: 0 | Status: SHIPPED | OUTPATIENT
Start: 2017-11-24 | End: 2017-12-04

## 2017-11-27 ENCOUNTER — OFFICE VISIT (OUTPATIENT)
Dept: UROLOGY | Facility: CLINIC | Age: 57
End: 2017-11-27
Payer: COMMERCIAL

## 2017-11-27 VITALS — HEART RATE: 84 BPM | DIASTOLIC BLOOD PRESSURE: 84 MMHG | RESPIRATION RATE: 18 BRPM | SYSTOLIC BLOOD PRESSURE: 122 MMHG

## 2017-11-27 DIAGNOSIS — Z98.890 POSTOPERATIVE STATE: Primary | ICD-10-CM

## 2017-11-27 PROCEDURE — 99024 POSTOP FOLLOW-UP VISIT: CPT | Performed by: UROLOGY

## 2017-11-27 NOTE — NURSING NOTE
"Chief Complaint   Patient presents with     RECHECK     mesh exposure       Initial /84 (BP Location: Right arm, Patient Position: Chair, Cuff Size: Adult Large)  Pulse 84  Resp 18 Estimated body mass index is 31.3 kg/(m^2) as calculated from the following:    Height as of 9/27/17: 1.638 m (5' 4.5\").    Weight as of 9/27/17: 84 kg (185 lb 3.2 oz).  Medication Reconciliation: complete   Nan Brown, NAYANA      "

## 2017-11-27 NOTE — PROGRESS NOTES
Chief Complaint   Patient presents with     RECHECK     mesh exposure       Pily Roche is a 57 year old female who presents today for follow up of   Chief Complaint   Patient presents with     RECHECK     mesh exposure    f/u post excision of mesh exposure.  She has occasional spotting otherwise no other complaints.    Current Outpatient Prescriptions   Medication Sig Dispense Refill     busPIRone (BUSPAR) 5 MG tablet Take 2 tablets (10 mg) by mouth 2 times daily 360 tablet 0     benzonatate (TESSALON) 200 MG capsule Take 1 capsule (200 mg) by mouth 3 times daily as needed for cough 21 capsule 0     azithromycin (ZITHROMAX) 250 MG tablet Take 2 tabs on day 1, then 1 tab on days 2-5 6 tablet 0     albuterol (VENTOLIN HFA) 108 (90 BASE) MCG/ACT Inhaler INHALE 2 PUFFS EVERY 6 HOURS AS NEEDED FOR SHORTNESS OF BREATH OR DYSPNEA 36 g 1     benzonatate (TESSALON PERLES) 100 MG capsule Take 1 capsule (100 mg) by mouth 3 times daily as needed for cough 42 capsule 0     oxybutynin (DITROPAN-XL) 5 MG 24 hr tablet Take 1 tablet (5 mg) by mouth daily (Patient not taking: Reported on 11/27/2017) 30 tablet 1     loratadine (CLARITIN) 10 MG tablet Take 1 tablet (10 mg) by mouth daily 90 tablet 1     omeprazole (PRILOSEC) 20 MG CR capsule Take 1 capsule (20 mg) by mouth daily as needed 90 capsule 1     methocarbamol (ROBAXIN) 500 MG tablet Take 2 tablets (1,000 mg) by mouth 3 times daily as needed for muscle spasms 270 tablet 1     traZODone (DESYREL) 100 MG tablet Take 2 tablets (200 mg) by mouth At Bedtime 180 tablet 1     gabapentin (NEURONTIN) 300 MG capsule TAKE 3 CAPSULES BY MOUTH THREE TIMES DAILY 810 capsule 1     mometasone-formoterol (DULERA) 200-5 MCG/ACT oral inhaler Inhale 2 puffs into the lungs 2 times daily 3 Inhaler 1     simvastatin (ZOCOR) 40 MG tablet Take 1 tablet (40 mg) by mouth At Bedtime 90 tablet 1     metFORMIN (GLUCOPHAGE-XR) 500 MG 24 hr tablet Take 4 tablets (2,000 mg) by mouth daily (with dinner)  "360 tablet 1     diclofenac (VOLTAREN) 75 MG EC tablet TAKE 1 TABLET BY MOUTH TWICE DAILY AS NEEDED FOR MODERATE PAIN. 180 tablet 1     buPROPion (WELLBUTRIN SR) 150 MG 12 hr tablet Take 1 tablet (150 mg) by mouth 2 times daily 180 tablet 1     fluticasone (FLONASE) 50 MCG/ACT spray Spray 2 sprays into both nostrils daily 48 g 1     aspirin 81 MG tablet Take 1 tablet (81 mg) by mouth At Bedtime (Patient not taking: Reported on 9/27/2017) 90 tablet 3     lidocaine (LIDODERM) 5 % Patch APPLY 1 PATCH ONTO SKIN. WEAR FOR 12 HOURS THEN PATCH FREE FOR 12 HOURS 90 patch 1     order for DME 1 glucometer per insurance formulary 1 Device 0     order for DME Diabetic test strips and lancets per insurance formulary Check blood sugar once per day 3 Month 1     Allergies   Allergen Reactions     Quetiapine Anaphylaxis     Seroquel [Quetiapine Fumarate]      Insomnia  , anhedonia       Ambien [Zolpidem Tartrate] Difficulty breathing     Reaction after taking 5 mg on Wed, Thurs, then drank Friday afternoon and developed symptoms,  Irrability.       Zolpidem Unknown      Past Medical History:   Diagnosis Date     Advanced directives, counseling/discussion 2/26/2013    Patient does not have an Advance/Health Care Directive (HCD), given \"What is Advance Care Planning?\" flyer.  Maureen Iglesias February 26, 2013      Arthritis      Disorders of bursae and tendons in shoulder region, unspecified 6/8/2010     Dysfunctional uterine bleeding      History of substance abuse 2006    cocaine, completed rehab     Hyperlipidemia      Insomnia      Lyme disease 1990     Pain in shoulder 3/16/2010     Seasonal allergies      Tobacco use disorders      Type 2 diabetes, HbA1c goal < 7% (H) 7/18/2013     Past Surgical History:   Procedure Laterality Date     C SHOULDER SURG PROC UNLISTED  1992    X 3 (broken clavicle and rotator cuff tear with impingement     HC SACROPLASTY  1990's    Herniated L4-L5 X 2     HYSTERECTOMY, PAP NO LONGER INDICATED  " 2005     HYSTERECTOMY, VAGINAL  6/28/05    Ovaries in Place     Family History   Problem Relation Age of Onset     DIABETES Mother      Hypertension Mother      Gynecology Mother      Arthritis Mother      Thyroid Disease Mother      Allergies Mother      Lipids Father      HEART DISEASE Father      C.A.D. Father      Hypertension Maternal Grandmother      Arthritis Maternal Grandmother      CANCER Maternal Grandmother      CANCER Maternal Grandfather      C.A.D. Maternal Grandfather      Asthma Paternal Grandmother      C.A.D. Paternal Grandmother      CEREBROVASCULAR DISEASE Paternal Grandfather      Alzheimer Disease Paternal Grandfather      Arthritis Paternal Grandfather      C.A.D. Paternal Grandfather      Cardiovascular Paternal Grandfather      Circulatory Paternal Grandfather      Glaucoma No family hx of      Macular Degeneration No family hx of      Social History     Social History     Marital status:      Spouse name: N/A     Number of children: N/A     Years of education: N/A     Social History Main Topics     Smoking status: Current Every Day Smoker     Packs/day: 1.50     Years: 30.00     Types: Cigarettes     Smokeless tobacco: Never Used      Comment: 1/2 pack to 1 ppd, has an ecig     Alcohol use 3.0 - 6.0 oz/week     5 - 10 Standard drinks or equivalent per week      Comment: 2beers every few fews     Drug use: Yes      Comment: marijuana     Sexual activity: Yes     Partners: Male     Other Topics Concern     Parent/Sibling W/ Cabg, Mi Or Angioplasty Before 65f 55m? Yes      Service No     Blood Transfusions No     Caffeine Concern No     Occupational Exposure No     Hobby Hazards No     Sleep Concern Yes     Stress Concern Yes     Weight Concern Yes     Special Diet No     Back Care Yes     Exercise Yes     Bike Helmet No     Seat Belt Yes     Self-Exams No     Social History Narrative       REVIEW OF SYSTEMS  =================  C: NEGATIVE for fever, chills, change in  weight  I: NEGATIVE for worrisome rashes, moles or lesions  E/M: NEGATIVE for ear, mouth and throat problems  R: NEGATIVE for significant cough or SHORTNESS OF BREATH,   CV: NEGATIVE for chest pain, palpitations or peripheral edema  GI: NEGATIVE for nausea, abdominal pain, heartburn, or change in bowel habits  NEURO: NEGATIVE any motor/sensory changes  PSYCH: NEGATIVE for recent mood disorder    Physical Exam:  /84 (BP Location: Right arm, Patient Position: Chair, Cuff Size: Adult Large)  Pulse 84  Resp 18   Patient is pleasant, in no acute distress, good general condition.  Lung: no evidence of respiratory distress    Abdomen: Soft, nondistended, non tender. No masses. No rebound or guarding.   Exam: normal vaginal introitus.  I can't feel any mesh at this time.  Skin: Warm and dry.  No redness.  Psych: normal mood and affect  Neuro: alert and oriented    Assessment/Plan:   (G89.18) Postoperative state  (primary encounter diagnosis)  Comment:   Plan: f/u as needed.

## 2017-11-27 NOTE — MR AVS SNAPSHOT
"              After Visit Summary   2017    Pily Roche    MRN: [id number]           Patient Information     Date Of Birth          1960        Visit Information        Provider Department      2017 10:00 AM Antonio Diallo MD [facility]        Today's Diagnoses     Postoperative state    -  1       Follow-ups after your visit        Who to contact     If you have questions or need follow up information about today's clinic visit or your schedule please contact [facility] directly at [phone].  Normal or non-critical lab and imaging results will be communicated to you by TripOvationhart, letter or phone within 4 business days after the clinic has received the results. If you do not hear from us within 7 days, please contact the clinic through TripOvationhart or phone. If you have a critical or abnormal lab result, we will notify you by phone as soon as possible.  Submit refill requests through [employer] or call your pharmacy and they will forward the refill request to us. Please allow 3 business days for your refill to be completed.          Additional Information About Your Visit        MyChart Information     [employer] lets you send messages to your doctor, view your test results, renew your prescriptions, schedule appointments and more. To sign up, go to [email or web address] . Click on \"Log in\" on the left side of the screen, which will take you to the Welcome page. Then click on \"Sign up Now\" on the right side of the page.     You will be asked to enter the access code listed below, as well as some personal information. Please follow the directions to create your username and password.     Your access code is: [email or web address]  Expires: 2018 11:50 AM     Your access code will  in 90 days. If you need help or a new code, please call your [facility] or [phone].        Care EveryWhere ID     This is your Care EveryWhere ID. This could be used by other " organizations to access your [facility] medical records  [id number]        Your Vitals Were     Pulse Respirations                84 18           Blood Pressure from Last 3 Encounters:   11/27/17 122/84   09/27/17 (!) 145/92   08/25/17 130/84    Weight from Last 3 Encounters:   09/27/17 84 kg (185 lb 3.2 oz)   07/12/17 81.2 kg (179 lb)   07/07/17 80.3 kg (177 lb)              Today, you had the following     No orders found for display       Primary Care Provider Office Phone # Fax #    Kath Fierro PA-C [phone]       [address]  [facility] [address]        Equal Access to Services     KARISSA CARDENAS : Hadii aad ku hadasho Soomaali, waaxda luqadaha, qaybta kaalmada adeegyada, waxay idiin haydayton lopez . So [facility] [phone].    ATENCIÓN: Si habla español, tiene a street disposición servicios gratuitos de asistencia lingüística. [facility] [phone].    We comply with applicable federal civil rights laws and [place] laws. We do not discriminate on the basis of race, color, national origin, age, disability, sex, sexual orientation, or gender identity.            Thank you!     Thank you for choosing [facility]  for your care. Our goal is always to provide you with excellent care. Hearing back from our patients is one way we can continue to improve our services. Please take a few minutes to complete the written survey that you may receive in the mail after your visit with us. Thank you!             Your Updated Medication List - Protect others around you: Learn how to safely use, store and throw away your medicines at www.disposemymeds.org.          This list is accurate as of: 11/27/17 10:12 AM.  Always use your most recent med list.                   Brand Name Dispense Instructions for use Diagnosis    albuterol 108 (90 BASE) MCG/ACT Inhaler    VENTOLIN HFA    36 g    INHALE 2 PUFFS EVERY 6 HOURS AS NEEDED FOR SHORTNESS OF BREATH OR DYSPNEA    Chronic  bronchitis with COPD (chronic obstructive pulmonary disease) (H)       aspirin 81 MG tablet     90 tablet    Take 1 tablet (81 mg) by mouth At Bedtime    Type 2 diabetes mellitus with stage 2 chronic kidney disease, without long-term current use of insulin (H)       azithromycin 250 MG tablet    ZITHROMAX    6 tablet    Take 2 tabs on day 1, then 1 tab on days 2-5    Chronic bronchitis with COPD (chronic obstructive pulmonary disease) (H)       * benzonatate 200 MG capsule    TESSALON    21 capsule    Take 1 capsule (200 mg) by mouth 3 times daily as needed for cough    Chronic bronchitis with COPD (chronic obstructive pulmonary disease) (H)       * benzonatate 100 MG capsule    TESSALON PERLES    42 capsule    Take 1 capsule (100 mg) by mouth 3 times daily as needed for cough    Cough       buPROPion 150 MG 12 hr tablet    WELLBUTRIN SR    180 tablet    Take 1 tablet (150 mg) by mouth 2 times daily    Tobacco use disorder, Major depressive disorder, recurrent episode, moderate (H)       busPIRone 5 MG tablet    BUSPAR    360 tablet    Take 2 tablets (10 mg) by mouth 2 times daily    GUDELIA (generalized anxiety disorder)       diclofenac 75 MG EC tablet    VOLTAREN    180 tablet    TAKE 1 TABLET BY MOUTH TWICE DAILY AS NEEDED FOR MODERATE PAIN.    Chronic shoulder pain, unspecified laterality       fluticasone 50 MCG/ACT spray    FLONASE    48 g    Spray 2 sprays into both nostrils daily    LPRD (laryngopharyngeal reflux disease)       gabapentin 300 MG capsule    NEURONTIN    810 capsule    TAKE 3 CAPSULES BY MOUTH THREE TIMES DAILY    Chronic shoulder pain, unspecified laterality       lidocaine 5 % Patch    LIDODERM    90 patch    APPLY 1 PATCH ONTO SKIN. WEAR FOR 12 HOURS THEN PATCH FREE FOR 12 HOURS    Chronic shoulder pain, unspecified laterality       loratadine 10 MG tablet    CLARITIN    90 tablet    Take 1 tablet (10 mg) by mouth daily    LPRD (laryngopharyngeal reflux disease)       metFORMIN 500 MG 24 hr  tablet    GLUCOPHAGE-XR    360 tablet    Take 4 tablets (2,000 mg) by mouth daily (with dinner)    Type 2 diabetes mellitus with stage 2 chronic kidney disease, without long-term current use of insulin (H)       methocarbamol 500 MG tablet    ROBAXIN    270 tablet    Take 2 tablets (1,000 mg) by mouth 3 times daily as needed for muscle spasms    Chronic shoulder pain, unspecified laterality       mometasone-formoterol 200-5 MCG/ACT oral inhaler    DULERA    3 Inhaler    Inhale 2 puffs into the lungs 2 times daily    Chronic bronchitis with COPD (chronic obstructive pulmonary disease) (H)       omeprazole 20 MG CR capsule    priLOSEC    90 capsule    Take 1 capsule (20 mg) by mouth daily as needed    LPRD (laryngopharyngeal reflux disease)       * order for DME     1 Device    1 glucometer per insurance formulary    Type 2 diabetes mellitus without retinopathy (H)       * order for DME     3 Month    Diabetic test strips and lancets per insurance formulary Check blood sugar once per day    Type 2 diabetes mellitus without retinopathy (H)       oxybutynin 5 MG 24 hr tablet    DITROPAN-XL    30 tablet    Take 1 tablet (5 mg) by mouth daily    Urinary incontinence without sensory awareness       simvastatin 40 MG tablet    ZOCOR    90 tablet    Take 1 tablet (40 mg) by mouth At Bedtime    Hyperlipidemia LDL goal <100       traZODone 100 MG tablet    DESYREL    180 tablet    Take 2 tablets (200 mg) by mouth At Bedtime    Major depressive disorder, recurrent episode, moderate (H)       * Notice:  This list has 4 medication(s) that are the same as other medications prescribed for you. Read the directions carefully, and ask your doctor or other care provider to review them with you.

## 2017-11-29 NOTE — TELEPHONE ENCOUNTER
Please call patient, she was to f/u 2-3 months after her visit in September, so she is due for a recheck now.  Script has been refilled, but she should try to get in soon to check in on how things are going.   Kath Fierro PA-C

## 2017-12-04 ENCOUNTER — OFFICE VISIT (OUTPATIENT)
Dept: FAMILY MEDICINE | Facility: CLINIC | Age: 57
End: 2017-12-04
Payer: COMMERCIAL

## 2017-12-04 VITALS
HEART RATE: 100 BPM | TEMPERATURE: 99.1 F | DIASTOLIC BLOOD PRESSURE: 78 MMHG | WEIGHT: 191.4 LBS | HEIGHT: 65 IN | BODY MASS INDEX: 31.89 KG/M2 | SYSTOLIC BLOOD PRESSURE: 119 MMHG

## 2017-12-04 DIAGNOSIS — F17.200 TOBACCO USE DISORDER: ICD-10-CM

## 2017-12-04 DIAGNOSIS — L60.0 INGROWN TOENAIL WITHOUT INFECTION: ICD-10-CM

## 2017-12-04 DIAGNOSIS — J44.89 CHRONIC BRONCHITIS WITH COPD (CHRONIC OBSTRUCTIVE PULMONARY DISEASE) (H): ICD-10-CM

## 2017-12-04 DIAGNOSIS — K21.9 LPRD (LARYNGOPHARYNGEAL REFLUX DISEASE): ICD-10-CM

## 2017-12-04 DIAGNOSIS — N18.2 TYPE 2 DIABETES MELLITUS WITH STAGE 2 CHRONIC KIDNEY DISEASE, WITHOUT LONG-TERM CURRENT USE OF INSULIN (H): ICD-10-CM

## 2017-12-04 DIAGNOSIS — N39.42 URINARY INCONTINENCE WITHOUT SENSORY AWARENESS: ICD-10-CM

## 2017-12-04 DIAGNOSIS — F41.1 GAD (GENERALIZED ANXIETY DISORDER): Primary | ICD-10-CM

## 2017-12-04 DIAGNOSIS — E11.22 TYPE 2 DIABETES MELLITUS WITH STAGE 2 CHRONIC KIDNEY DISEASE, WITHOUT LONG-TERM CURRENT USE OF INSULIN (H): ICD-10-CM

## 2017-12-04 DIAGNOSIS — G89.29 CHRONIC SHOULDER PAIN, UNSPECIFIED LATERALITY: ICD-10-CM

## 2017-12-04 DIAGNOSIS — M25.519 CHRONIC SHOULDER PAIN, UNSPECIFIED LATERALITY: ICD-10-CM

## 2017-12-04 DIAGNOSIS — E78.5 HYPERLIPIDEMIA LDL GOAL <100: ICD-10-CM

## 2017-12-04 DIAGNOSIS — F33.1 MAJOR DEPRESSIVE DISORDER, RECURRENT EPISODE, MODERATE (H): ICD-10-CM

## 2017-12-04 LAB
ANION GAP SERPL CALCULATED.3IONS-SCNC: 8 MMOL/L (ref 3–14)
BUN SERPL-MCNC: 18 MG/DL (ref 7–30)
CALCIUM SERPL-MCNC: 8.7 MG/DL (ref 8.5–10.1)
CHLORIDE SERPL-SCNC: 106 MMOL/L (ref 94–109)
CO2 SERPL-SCNC: 26 MMOL/L (ref 20–32)
CREAT SERPL-MCNC: 0.68 MG/DL (ref 0.52–1.04)
GFR SERPL CREATININE-BSD FRML MDRD: 89 ML/MIN/1.7M2
GLUCOSE SERPL-MCNC: 148 MG/DL (ref 70–99)
HBA1C MFR BLD: 6.3 % (ref 4.3–6)
POTASSIUM SERPL-SCNC: 3.7 MMOL/L (ref 3.4–5.3)
SODIUM SERPL-SCNC: 140 MMOL/L (ref 133–144)

## 2017-12-04 PROCEDURE — 80048 BASIC METABOLIC PNL TOTAL CA: CPT | Performed by: PHYSICIAN ASSISTANT

## 2017-12-04 PROCEDURE — 99214 OFFICE O/P EST MOD 30 MIN: CPT | Performed by: PHYSICIAN ASSISTANT

## 2017-12-04 PROCEDURE — 83036 HEMOGLOBIN GLYCOSYLATED A1C: CPT | Performed by: PHYSICIAN ASSISTANT

## 2017-12-04 PROCEDURE — 36415 COLL VENOUS BLD VENIPUNCTURE: CPT | Performed by: PHYSICIAN ASSISTANT

## 2017-12-04 RX ORDER — GABAPENTIN 300 MG/1
CAPSULE ORAL
Qty: 810 CAPSULE | Refills: 1 | Status: SHIPPED | OUTPATIENT
Start: 2017-12-04 | End: 2019-06-10

## 2017-12-04 RX ORDER — FLUTICASONE PROPIONATE 50 MCG
2 SPRAY, SUSPENSION (ML) NASAL DAILY
Qty: 48 G | Refills: 1 | Status: SHIPPED | OUTPATIENT
Start: 2017-12-04 | End: 2019-06-10

## 2017-12-04 RX ORDER — DICLOFENAC SODIUM 75 MG/1
TABLET, DELAYED RELEASE ORAL
Qty: 180 TABLET | Refills: 1 | Status: SHIPPED | OUTPATIENT
Start: 2017-12-04 | End: 2018-09-13

## 2017-12-04 RX ORDER — OXYBUTYNIN CHLORIDE 5 MG/1
5 TABLET, EXTENDED RELEASE ORAL DAILY
Qty: 90 TABLET | Refills: 1 | Status: SHIPPED | OUTPATIENT
Start: 2017-12-04 | End: 2019-06-10

## 2017-12-04 RX ORDER — SIMVASTATIN 40 MG
40 TABLET ORAL AT BEDTIME
Qty: 90 TABLET | Refills: 1 | Status: SHIPPED | OUTPATIENT
Start: 2017-12-04 | End: 2019-06-10

## 2017-12-04 RX ORDER — BUPROPION HYDROCHLORIDE 150 MG/1
150 TABLET, EXTENDED RELEASE ORAL 2 TIMES DAILY
Qty: 180 TABLET | Refills: 1 | Status: SHIPPED | OUTPATIENT
Start: 2017-12-04 | End: 2018-12-05

## 2017-12-04 RX ORDER — METFORMIN HCL 500 MG
2000 TABLET, EXTENDED RELEASE 24 HR ORAL
Qty: 360 TABLET | Refills: 1 | Status: SHIPPED | OUTPATIENT
Start: 2017-12-04 | End: 2019-06-10

## 2017-12-04 RX ORDER — BUSPIRONE HYDROCHLORIDE 15 MG/1
15 TABLET ORAL 2 TIMES DAILY
Qty: 180 TABLET | Refills: 0 | Status: SHIPPED | OUTPATIENT
Start: 2017-12-04 | End: 2018-04-05

## 2017-12-04 RX ORDER — METHOCARBAMOL 500 MG/1
1000 TABLET, FILM COATED ORAL 3 TIMES DAILY PRN
Qty: 270 TABLET | Refills: 1 | Status: SHIPPED | OUTPATIENT
Start: 2017-12-04 | End: 2018-06-29

## 2017-12-04 RX ORDER — LORATADINE 10 MG/1
10 TABLET ORAL DAILY
Qty: 90 TABLET | Refills: 1 | Status: SHIPPED | OUTPATIENT
Start: 2017-12-04 | End: 2018-10-18

## 2017-12-04 RX ORDER — TRAZODONE HYDROCHLORIDE 100 MG/1
200 TABLET ORAL AT BEDTIME
Qty: 180 TABLET | Refills: 1 | Status: SHIPPED | OUTPATIENT
Start: 2017-12-04 | End: 2018-07-11

## 2017-12-04 ASSESSMENT — PATIENT HEALTH QUESTIONNAIRE - PHQ9
5. POOR APPETITE OR OVEREATING: SEVERAL DAYS
SUM OF ALL RESPONSES TO PHQ QUESTIONS 1-9: 12

## 2017-12-04 ASSESSMENT — ANXIETY QUESTIONNAIRES
7. FEELING AFRAID AS IF SOMETHING AWFUL MIGHT HAPPEN: NOT AT ALL
6. BECOMING EASILY ANNOYED OR IRRITABLE: NEARLY EVERY DAY
2. NOT BEING ABLE TO STOP OR CONTROL WORRYING: MORE THAN HALF THE DAYS
5. BEING SO RESTLESS THAT IT IS HARD TO SIT STILL: SEVERAL DAYS
GAD7 TOTAL SCORE: 11
3. WORRYING TOO MUCH ABOUT DIFFERENT THINGS: SEVERAL DAYS
1. FEELING NERVOUS, ANXIOUS, OR ON EDGE: NEARLY EVERY DAY

## 2017-12-04 NOTE — LETTER
December 4, 2017      Pily Roche  [address]  [facility] [place] [address]        Dear ,    We are writing to inform you of your test results.    Your labs look good.     Resulted Orders   Basic metabolic panel   Result Value Ref Range    Sodium 140 133 - 144 mmol/L    Potassium 3.7 3.4 - 5.3 mmol/L    Chloride 106 94 - 109 mmol/L    Carbon Dioxide 26 20 - 32 mmol/L    Anion Gap 8 3 - 14 mmol/L    Glucose 148 (H) 70 - 99 mg/dL    Urea Nitrogen 18 7 - 30 mg/dL    Creatinine 0.68 0.52 - 1.04 mg/dL    GFR Estimate 89 >60 mL/min/1.7m2      Comment:      Non  GFR Calc    GFR Estimate If Black >90 >60 mL/min/1.7m2      Comment:       GFR Calc    Calcium 8.7 8.5 - 10.1 mg/dL   Hemoglobin A1c   Result Value Ref Range    Hemoglobin A1C 6.3 (H) 4.3 - 6.0 %       If you have any questions or concerns, please call the clinic at the number listed above.       Sincerely,        Kath Fierro PA-C/am

## 2017-12-04 NOTE — NURSING NOTE
"No chief complaint on file.      Initial /78  Pulse 100  Temp 99.1  F (37.3  C) (Tympanic)  Ht 5' 4.5\" (1.638 m)  Wt 191 lb 6.4 oz (86.8 kg)  Breastfeeding? No  BMI 32.35 kg/m2 Estimated body mass index is 32.35 kg/(m^2) as calculated from the following:    Height as of this encounter: 5' 4.5\" (1.638 m).    Weight as of this encounter: 191 lb 6.4 oz (86.8 kg).  Medication Reconciliation: complete     Camille Dawn MA      "

## 2017-12-04 NOTE — MR AVS SNAPSHOT
After Visit Summary   12/4/2017    Pily Roche    MRN: [id number]           Patient Information     Date Of Birth          1960        Visit Information        Provider Department      12/4/2017 9:00 AM Kath Fierro PA-C [facility]        Today's Diagnoses     Ingrown toenail without infection    -  1    GUDELIA (generalized anxiety disorder)        Major depressive disorder, recurrent episode, moderate (H)        LPRD (laryngopharyngeal reflux disease)        Urinary incontinence without sensory awareness        Chronic shoulder pain, unspecified laterality        Hyperlipidemia LDL goal <100        Type 2 diabetes mellitus with stage 2 chronic kidney disease, without long-term current use of insulin (H)        Tobacco use disorder        Chronic bronchitis with COPD (chronic obstructive pulmonary disease)           Follow-ups after your visit        Additional Services     ORTHO  REFERRAL       [facility] is referring you to the Orthopedic  Services at [facility].       The  Representative will assist you in the coordination of your Orthopedic and Musculoskeletal Care as prescribed by your physician.    The  Representative will call you within 1 business day to help schedule your appointment, or you may contact the  Representative at:    All areas ~ [phone]     Type of Referral : [facility]       Timeframe requested: Routine    Coverage of these services is subject to the terms and limitations of your health insurance plan.  Please call member services at your health plan with any benefit or coverage questions.      If X-rays, CT or MRI's have been performed, please contact the facility where they were done to arrange for , prior to your scheduled appointment.  Please bring this referral request to your appointment and present it to your  "specialist.                  Who to contact     Normal or non-critical lab and imaging results will be communicated to you by IDxhart, letter or phone within 4 business days after the clinic has received the results. If you do not hear from us within 7 days, please contact the clinic through IDxhart or phone. If you have a critical or abnormal lab result, we will notify you by phone as soon as possible.  Submit refill requests through [employer] or call your pharmacy and they will forward the refill request to us. Please allow 3 business days for your refill to be completed.          If you need to speak with a  for additional information , please call: [phone]           Additional Information About Your Visit        [employer] Information     [employer] lets you send messages to your doctor, view your test results, renew your prescriptions, schedule appointments and more. To sign up, go to [email or web address] . Click on \"Log in\" on the left side of the screen, which will take you to the Welcome page. Then click on \"Sign up Now\" on the right side of the page.     You will be asked to enter the access code listed below, as well as some personal information. Please follow the directions to create your username and password.     Your access code is: [email or web address]  Expires: 2018 11:50 AM     Your access code will  in 90 days. If you need help or a new code, please call your [facility] clinic or [phone].        Care EveryWhere ID     This is your Care EveryWhere ID. This could be used by other organizations to access your [facility] medical records  [id number]        Your Vitals Were     Pulse Temperature Height Breastfeeding? BMI (Body Mass Index)       100 99.1  F (37.3  C) (Tympanic) 5' 4.5\" (1.638 m) No 32.35 kg/m2        Blood Pressure from Last 3 Encounters:   17 119/78   17 122/84   17 (!) 145/92    Weight from Last 3 Encounters:   17 191 lb 6.4 oz (86.8 kg) "   09/27/17 185 lb 3.2 oz (84 kg)   07/12/17 179 lb (81.2 kg)              We Performed the Following     Basic metabolic panel     Hemoglobin A1c     ORTHO  REFERRAL          Today's Medication Changes          These changes are accurate as of: 12/4/17  9:36 AM.  If you have any questions, ask your nurse or doctor.               These medicines have changed or have updated prescriptions.        Dose/Directions    busPIRone 15 MG tablet   Commonly known as:  BUSPAR   This may have changed:    - medication strength  - how much to take   Used for:  GUDELIA (generalized anxiety disorder)   Changed by:  Kath Fierro PA-C        Dose:  15 mg   Take 1 tablet (15 mg) by mouth 2 times daily   Quantity:  180 tablet   Refills:  0            Where to get your medicines      These medications were sent to [id number] - [facility], [place] - [address] [facility] AT [address] [place] [address] & Health system  [address] [facility] [address]     Phone:  [phone]     aspirin 81 MG tablet    buPROPion 150 MG 12 hr tablet    busPIRone 15 MG tablet    diclofenac 75 MG EC tablet    fluticasone 50 MCG/ACT spray    gabapentin 300 MG capsule    loratadine 10 MG tablet    metFORMIN 500 MG 24 hr tablet    methocarbamol 500 MG tablet    omeprazole 20 MG CR capsule    oxybutynin 5 MG 24 hr tablet    simvastatin 40 MG tablet    traZODone 100 MG tablet         Call your pharmacy to confirm that your medication is ready for pickup. It may take up to 24 hours for them to receive the prescription. If the prescription is not ready within 3 business days, please contact your clinic or your provider.     We will let you know when these medications are ready. If you don't hear back within 3 business days, please contact us.     mometasone-formoterol 200-5 MCG/ACT oral inhaler                Primary Care Provider Office Phone # Fax #    Kath Fierro PA-C [phone]       [facility] [address]  [place] [address]        Equal Access to Services     KARISSA CARDENAS : Hadii ashley hutson hadgerardoclarke Sodayanna, waaxda luqadaha, qaybta kakimmyrin quinones, lincoln murrellsybilkelsey wells. So [facility] [phone].    ATENCIÓN: Si habla español, tiene a street disposición servicios gratuitos de asistencia lingüística. Llame al [phone].    We comply with applicable federal civil rights laws and [place] laws. We do not discriminate on the basis of race, color, national origin, age, disability, sex, sexual orientation, or gender identity.            Thank you!     Thank you for choosing [facility]  for your care. Our goal is always to provide you with excellent care. Hearing back from our patients is one way we can continue to improve our services. Please take a few minutes to complete the written survey that you may receive in the mail after your visit with us. Thank you!             Your Updated Medication List - Protect others around you: Learn how to safely use, store and throw away your medicines at www.disposemymeds.org.          This list is accurate as of: 12/4/17  9:36 AM.  Always use your most recent med list.                   Brand Name Dispense Instructions for use Diagnosis    albuterol 108 (90 BASE) MCG/ACT Inhaler    VENTOLIN HFA    36 g    INHALE 2 PUFFS EVERY 6 HOURS AS NEEDED FOR SHORTNESS OF BREATH OR DYSPNEA    Chronic bronchitis with COPD (chronic obstructive pulmonary disease) (H)       aspirin 81 MG tablet     90 tablet    Take 1 tablet (81 mg) by mouth At Bedtime    Type 2 diabetes mellitus with stage 2 chronic kidney disease, without long-term current use of insulin (H)       buPROPion 150 MG 12 hr tablet    WELLBUTRIN SR    180 tablet    Take 1 tablet (150 mg) by mouth 2 times daily    Tobacco use disorder, Major depressive disorder, recurrent episode, moderate (H)       busPIRone 15 MG tablet    BUSPAR    180 tablet    Take 1 tablet (15 mg) by mouth 2 times daily    GUDELIA  (generalized anxiety disorder)       diclofenac 75 MG EC tablet    VOLTAREN    180 tablet    TAKE 1 TABLET BY MOUTH TWICE DAILY AS NEEDED FOR MODERATE PAIN.    Chronic shoulder pain, unspecified laterality       fluticasone 50 MCG/ACT spray    FLONASE    48 g    Spray 2 sprays into both nostrils daily    LPRD (laryngopharyngeal reflux disease)       gabapentin 300 MG capsule    NEURONTIN    810 capsule    TAKE 3 CAPSULES BY MOUTH THREE TIMES DAILY    Chronic shoulder pain, unspecified laterality       lidocaine 5 % Patch    LIDODERM    90 patch    APPLY 1 PATCH ONTO SKIN. WEAR FOR 12 HOURS THEN PATCH FREE FOR 12 HOURS    Chronic shoulder pain, unspecified laterality       loratadine 10 MG tablet    CLARITIN    90 tablet    Take 1 tablet (10 mg) by mouth daily    LPRD (laryngopharyngeal reflux disease)       metFORMIN 500 MG 24 hr tablet    GLUCOPHAGE-XR    360 tablet    Take 4 tablets (2,000 mg) by mouth daily (with dinner)    Type 2 diabetes mellitus with stage 2 chronic kidney disease, without long-term current use of insulin (H)       methocarbamol 500 MG tablet    ROBAXIN    270 tablet    Take 2 tablets (1,000 mg) by mouth 3 times daily as needed for muscle spasms    Chronic shoulder pain, unspecified laterality       mometasone-formoterol 200-5 MCG/ACT oral inhaler    DULERA    3 Inhaler    Inhale 2 puffs into the lungs 2 times daily    Chronic bronchitis with COPD (chronic obstructive pulmonary disease) (H)       omeprazole 20 MG CR capsule    priLOSEC    90 capsule    Take 1 capsule (20 mg) by mouth daily as needed    LPRD (laryngopharyngeal reflux disease)       * order for DME     1 Device    1 glucometer per insurance formulary    Type 2 diabetes mellitus without retinopathy (H)       * order for DME     3 Month    Diabetic test strips and lancets per insurance formulary Check blood sugar once per day    Type 2 diabetes mellitus without retinopathy (H)       oxybutynin 5 MG 24 hr tablet    DITROPAN-XL     90 tablet    Take 1 tablet (5 mg) by mouth daily    Urinary incontinence without sensory awareness       simvastatin 40 MG tablet    ZOCOR    90 tablet    Take 1 tablet (40 mg) by mouth At Bedtime    Hyperlipidemia LDL goal <100       traZODone 100 MG tablet    DESYREL    180 tablet    Take 2 tablets (200 mg) by mouth At Bedtime    Major depressive disorder, recurrent episode, moderate (H)       * Notice:  This list has 2 medication(s) that are the same as other medications prescribed for you. Read the directions carefully, and ask your doctor or other care provider to review them with you.

## 2017-12-04 NOTE — PROGRESS NOTES
SUBJECTIVE:   Pily Roche is a 57 year old female who presents to clinic today for the following health issues:      Anxiety Follow-Up    Status since last visit: Some improvement with the buspar however still irritable and snappy.    Other associated symptoms:None     Complicating factors:   Significant life event: Yes-  Had a friend pass away,  doesn't have a job currently    Current substance abuse: None  Depression symptoms: Yes-  GUDELIA-7 SCORE 3/10/2016 2016 2016   Total Score - - -   Total Score 3 7 10       GAD7      Buspar does help mellow her out overall, however is still irritable and snappy at times.          Amount of exercise or physical activity: 4-5 days/week for an average of 30-45 minutes    Problems taking medications regularly: No    Medication side effects: none    Diet: regular (no restrictions)      Tobacco:   Was able to cut down on cigarettes to 1 pack in 47 hours (once).  Then she had a lot of stress after this (5 people recently  and she has had many funerals) so is back to smoking a pack per day.      Diabetes:  Stable.     Foot pain.  She c/o pain in both great toenails, has ingrown nails on both sides, worse on right.  She has tried trimming the nails straight across and soaking them without improvement.  Would like toenail removed and wants a referral.      Incontinence.  Improved since seeing urologist.  She was using the oxybutynin with improvement and would like a refill.     Problem list and histories reviewed & adjusted, as indicated.  Additional history: as documented    Patient Active Problem List   Diagnosis     Insomnia     Chronic low back pain     Elevated liver enzymes     Moderate major depression (H)     Disorder of bursae and tendons in shoulder region     Chronic bronchitis with COPD (chronic obstructive pulmonary disease) (H)     Advance care planning     Type 2 diabetes, HbA1c goal < 7% (H)     Hyperlipidemia LDL goal <100     Impingement  syndrome, shoulder     Rotator cuff tear     AC separation     Tobacco use disorder     Anxiety     Chronic shoulder pain, unspecified laterality     CKD (chronic kidney disease) stage 2, GFR 60-89 ml/min     Macular degeneration     Type II diabetes mellitus with stage 2 chronic kidney disease (H)     Non morbid obesity due to excess calories     Menopausal symptoms     Chronic bilateral low back pain without sciatica     Chronic pain syndrome     Methamphetamine use     Type 2 diabetes mellitus without retinopathy (H)     H/O alcohol abuse     GUDELIA (generalized anxiety disorder)     Elevated blood pressure reading without diagnosis of hypertension     Ingrown toenail without infection     Past Surgical History:   Procedure Laterality Date     C SHOULDER SURG PROC UNLISTED  1992    X 3 (broken clavicle and rotator cuff tear with impingement     HC SACROPLASTY  1990's    Herniated L4-L5 X 2     HYSTERECTOMY, PAP NO LONGER INDICATED  2005     HYSTERECTOMY, VAGINAL  6/28/05    Ovaries in Place       Social History   Substance Use Topics     Smoking status: Current Every Day Smoker     Packs/day: 1.50     Years: 30.00     Types: Cigarettes     Smokeless tobacco: Never Used      Comment: 1/2 pack to 1 ppd, has an ecig     Alcohol use 3.0 - 6.0 oz/week     5 - 10 Standard drinks or equivalent per week      Comment: 2beers every few fews     Family History   Problem Relation Age of Onset     DIABETES Mother      Hypertension Mother      Gynecology Mother      Arthritis Mother      Thyroid Disease Mother      Allergies Mother      Lipids Father      HEART DISEASE Father      C.A.D. Father      Hypertension Maternal Grandmother      Arthritis Maternal Grandmother      CANCER Maternal Grandmother      CANCER Maternal Grandfather      C.A.D. Maternal Grandfather      Asthma Paternal Grandmother      C.A.D. Paternal Grandmother      CEREBROVASCULAR DISEASE Paternal Grandfather      Alzheimer Disease Paternal Grandfather       Arthritis Paternal Grandfather      C.A.D. Paternal Grandfather      Cardiovascular Paternal Grandfather      Circulatory Paternal Grandfather      Glaucoma No family hx of      Macular Degeneration No family hx of          Current Outpatient Prescriptions   Medication Sig Dispense Refill     busPIRone (BUSPAR) 15 MG tablet Take 1 tablet (15 mg) by mouth 2 times daily 180 tablet 0     traZODone (DESYREL) 100 MG tablet Take 2 tablets (200 mg) by mouth At Bedtime 180 tablet 1     loratadine (CLARITIN) 10 MG tablet Take 1 tablet (10 mg) by mouth daily 90 tablet 1     oxybutynin (DITROPAN-XL) 5 MG 24 hr tablet Take 1 tablet (5 mg) by mouth daily 90 tablet 1     omeprazole (PRILOSEC) 20 MG CR capsule Take 1 capsule (20 mg) by mouth daily as needed 90 capsule 1     methocarbamol (ROBAXIN) 500 MG tablet Take 2 tablets (1,000 mg) by mouth 3 times daily as needed for muscle spasms 270 tablet 1     gabapentin (NEURONTIN) 300 MG capsule TAKE 3 CAPSULES BY MOUTH THREE TIMES DAILY 810 capsule 1     simvastatin (ZOCOR) 40 MG tablet Take 1 tablet (40 mg) by mouth At Bedtime 90 tablet 1     metFORMIN (GLUCOPHAGE-XR) 500 MG 24 hr tablet Take 4 tablets (2,000 mg) by mouth daily (with dinner) 360 tablet 1     diclofenac (VOLTAREN) 75 MG EC tablet TAKE 1 TABLET BY MOUTH TWICE DAILY AS NEEDED FOR MODERATE PAIN. 180 tablet 1     buPROPion (WELLBUTRIN SR) 150 MG 12 hr tablet Take 1 tablet (150 mg) by mouth 2 times daily 180 tablet 1     fluticasone (FLONASE) 50 MCG/ACT spray Spray 2 sprays into both nostrils daily 48 g 1     aspirin 81 MG tablet Take 1 tablet (81 mg) by mouth At Bedtime 90 tablet 3     mometasone-formoterol (DULERA) 200-5 MCG/ACT oral inhaler Inhale 2 puffs into the lungs 2 times daily 3 Inhaler 1     albuterol (VENTOLIN HFA) 108 (90 BASE) MCG/ACT Inhaler INHALE 2 PUFFS EVERY 6 HOURS AS NEEDED FOR SHORTNESS OF BREATH OR DYSPNEA 36 g 1     lidocaine (LIDODERM) 5 % Patch APPLY 1 PATCH ONTO SKIN. WEAR FOR 12 HOURS THEN PATCH  FREE FOR 12 HOURS 90 patch 1     order for DME 1 glucometer per insurance formulary 1 Device 0     order for DME Diabetic test strips and lancets per insurance formulary Check blood sugar once per day 3 Month 1     [DISCONTINUED] loratadine (CLARITIN) 10 MG tablet Take 1 tablet (10 mg) by mouth daily 90 tablet 1     [DISCONTINUED] omeprazole (PRILOSEC) 20 MG CR capsule Take 1 capsule (20 mg) by mouth daily as needed 90 capsule 1     [DISCONTINUED] traZODone (DESYREL) 100 MG tablet Take 2 tablets (200 mg) by mouth At Bedtime 180 tablet 1     [DISCONTINUED] gabapentin (NEURONTIN) 300 MG capsule TAKE 3 CAPSULES BY MOUTH THREE TIMES DAILY 810 capsule 1     [DISCONTINUED] mometasone-formoterol (DULERA) 200-5 MCG/ACT oral inhaler Inhale 2 puffs into the lungs 2 times daily 3 Inhaler 1     [DISCONTINUED] simvastatin (ZOCOR) 40 MG tablet Take 1 tablet (40 mg) by mouth At Bedtime 90 tablet 1     [DISCONTINUED] metFORMIN (GLUCOPHAGE-XR) 500 MG 24 hr tablet Take 4 tablets (2,000 mg) by mouth daily (with dinner) 360 tablet 1     [DISCONTINUED] diclofenac (VOLTAREN) 75 MG EC tablet TAKE 1 TABLET BY MOUTH TWICE DAILY AS NEEDED FOR MODERATE PAIN. 180 tablet 1     [DISCONTINUED] buPROPion (WELLBUTRIN SR) 150 MG 12 hr tablet Take 1 tablet (150 mg) by mouth 2 times daily 180 tablet 1     [DISCONTINUED] fluticasone (FLONASE) 50 MCG/ACT spray Spray 2 sprays into both nostrils daily 48 g 1     BP Readings from Last 3 Encounters:   12/04/17 119/78   11/27/17 122/84   09/27/17 (!) 145/92    Wt Readings from Last 3 Encounters:   12/04/17 191 lb 6.4 oz (86.8 kg)   09/27/17 185 lb 3.2 oz (84 kg)   07/12/17 179 lb (81.2 kg)                        Reviewed and updated as needed this visit by clinical staff     Reviewed and updated as needed this visit by Provider         ROS:  Constitutional, HEENT, cardiovascular, pulmonary, GI, , musculoskeletal, neuro, skin, endocrine and psych systems are negative, except as otherwise  "noted.      OBJECTIVE:   /78  Pulse 100  Temp 99.1  F (37.3  C) (Tympanic)  Ht 5' 4.5\" (1.638 m)  Wt 191 lb 6.4 oz (86.8 kg)  Breastfeeding? No  BMI 32.35 kg/m2  Body mass index is 32.35 kg/(m^2).  GENERAL: healthy, alert and no distress  NECK: no adenopathy, no asymmetry, masses, or scars and thyroid normal to palpation  RESP: lungs clear to auscultation - no rales, rhonchi or wheezes  CV: regular rate and rhythm, normal S1 S2, no S3 or S4, no murmur, click or rub, no peripheral edema and peripheral pulses strong  ABDOMEN: soft, nontender, no hepatosplenomegaly, no masses and bowel sounds normal  MS: no gross musculoskeletal defects noted, no edema  Diabetic foot exam: normal DP and PT pulses and no trophic changes or ulcerative lesions  Ingrown toenails on medial aspect of both great toes.     Diagnostic Test Results:  Results for orders placed or performed in visit on 12/04/17 (from the past 24 hour(s))   Hemoglobin A1c   Result Value Ref Range    Hemoglobin A1C 6.3 (H) 4.3 - 6.0 %       ASSESSMENT/PLAN:       1. GUDELIA (generalized anxiety disorder)  Will try to increase buspar to 15 mg BID and recheck in 1-2 years.    - busPIRone (BUSPAR) 15 MG tablet; Take 1 tablet (15 mg) by mouth 2 times daily  Dispense: 180 tablet; Refill: 0    2. Ingrown toenail without infection  Discussed home care treatment for this condition including soaking in warm water BID, applying antibiotic ointment TID if red and swollen, placing a small piece of cotton under nail bed to promote nail growth outward and trimming nails straight across.  A small piece of cotton was placed under nail today.  If no improvement, she will follow-up for nail removal.     - ORTHO  REFERRAL    3. Major depressive disorder, recurrent episode, moderate (H)  Reactive depression (with recent family/friend deaths).  I offered counseling, she declined at this time.  She feels she is coping with it as well as she can.    - traZODone (DESYREL) " 100 MG tablet; Take 2 tablets (200 mg) by mouth At Bedtime  Dispense: 180 tablet; Refill: 1  - buPROPion (WELLBUTRIN SR) 150 MG 12 hr tablet; Take 1 tablet (150 mg) by mouth 2 times daily  Dispense: 180 tablet; Refill: 1    4. Urinary incontinence without sensory awareness  Her symptoms have improved nicely with the medication and she would like a refill.    - oxybutynin (DITROPAN-XL) 5 MG 24 hr tablet; Take 1 tablet (5 mg) by mouth daily  Dispense: 90 tablet; Refill: 1    5. LPRD (laryngopharyngeal reflux disease)  Stable.   - loratadine (CLARITIN) 10 MG tablet; Take 1 tablet (10 mg) by mouth daily  Dispense: 90 tablet; Refill: 1  - omeprazole (PRILOSEC) 20 MG CR capsule; Take 1 capsule (20 mg) by mouth daily as needed  Dispense: 90 capsule; Refill: 1  - fluticasone (FLONASE) 50 MCG/ACT spray; Spray 2 sprays into both nostrils daily  Dispense: 48 g; Refill: 1    6. Type 2 diabetes mellitus with stage 2 chronic kidney disease, without long-term current use of insulin (H)  A1C at goal, leave meds as is.   Still smoking, not ready to quit.   - metFORMIN (GLUCOPHAGE-XR) 500 MG 24 hr tablet; Take 4 tablets (2,000 mg) by mouth daily (with dinner)  Dispense: 360 tablet; Refill: 1  - aspirin 81 MG tablet; Take 1 tablet (81 mg) by mouth At Bedtime  Dispense: 90 tablet; Refill: 3  - Basic metabolic panel  - Hemoglobin A1c    7. Chronic shoulder pain, unspecified laterality  Chronic and stable.   - methocarbamol (ROBAXIN) 500 MG tablet; Take 2 tablets (1,000 mg) by mouth 3 times daily as needed for muscle spasms  Dispense: 270 tablet; Refill: 1  - gabapentin (NEURONTIN) 300 MG capsule; TAKE 3 CAPSULES BY MOUTH THREE TIMES DAILY  Dispense: 810 capsule; Refill: 1  - diclofenac (VOLTAREN) 75 MG EC tablet; TAKE 1 TABLET BY MOUTH TWICE DAILY AS NEEDED FOR MODERATE PAIN.  Dispense: 180 tablet; Refill: 1    8. Hyperlipidemia LDL goal <100  Stable.   - simvastatin (ZOCOR) 40 MG tablet; Take 1 tablet (40 mg) by mouth At Bedtime   Dispense: 90 tablet; Refill: 1    9. Tobacco use disorder  Still smoking, now is not a good time to try and quit, may try in the future.   - buPROPion (WELLBUTRIN SR) 150 MG 12 hr tablet; Take 1 tablet (150 mg) by mouth 2 times daily  Dispense: 180 tablet; Refill: 1    10. Chronic bronchitis with COPD (chronic obstructive pulmonary disease)  Stable.   - mometasone-formoterol (DULERA) 200-5 MCG/ACT oral inhaler; Inhale 2 puffs into the lungs 2 times daily  Dispense: 3 Inhaler; Refill: 1    Near the end of the exam, she states she would like a handicap sticker for her chronic back pain.  Her Father has a sticker, but he will sometimes lose his so she wants to get one for herself.  She states her chronic back pain is worsening and she may need an MRI.     We did not have time to fully address her disability parking sticker today, I suggest she schedule a recheck so we can discuss further.  I did tell her that I will not write a disability sticker for her to use if her father needs it and I am hesitant to use for her chronic back pain.      FUTURE APPOINTMENTS:       - Follow-up visit in 1-2 months to recheck anxiety and discuss back further.     Kath Fierro PA-C  [facility]

## 2017-12-05 ASSESSMENT — ANXIETY QUESTIONNAIRES: GAD7 TOTAL SCORE: 11

## 2017-12-13 ENCOUNTER — OFFICE VISIT (OUTPATIENT)
Dept: PODIATRY | Facility: CLINIC | Age: 57
End: 2017-12-13
Payer: COMMERCIAL

## 2017-12-13 VITALS — HEART RATE: 114 BPM | OXYGEN SATURATION: 95 % | WEIGHT: 190 LBS | HEIGHT: 64 IN | BODY MASS INDEX: 32.44 KG/M2

## 2017-12-13 DIAGNOSIS — L60.0 INGROWING NAIL: Primary | ICD-10-CM

## 2017-12-13 PROCEDURE — 11730 AVULSION NAIL PLATE SIMPLE 1: CPT | Mod: T5 | Performed by: PODIATRIST

## 2017-12-13 PROCEDURE — 99203 OFFICE O/P NEW LOW 30 MIN: CPT | Mod: 25 | Performed by: PODIATRIST

## 2017-12-13 NOTE — PROGRESS NOTES
"Subjective:    Pt is seen today in consult from Kath Fierro w/ the c/c of a painful ingrown right great nail lateral border.  This has been problematic for 3 month(s).  positivehistory of drainage from the site. This is slowly getting worse.  Aggravated by activity and relieved by rest.  Has tried soaking which has not helped.   denies history of trauma to the area.  Denies fever and chill, denies numbness and tingling, denies erythema on dorsum of foot.  Works as [employer].  Smoker.  Diabetes, but no numbness in toes.       ROS:  A 10-point review of systems was performed and is positive for that noted in the HPI and as seen below.  All other areas are negative.     Past Medical History:   Diagnosis Date     Advanced directives, counseling/discussion 2/26/2013    Patient does not have an Advance/Health Care Directive (HCD), given \"What is Advance Care Planning?\" flyer.  Maureen Iglesias February 26, 2013      Arthritis      Disorders of bursae and tendons in shoulder region, unspecified 6/8/2010     Dysfunctional uterine bleeding      History of substance abuse 2006    cocaine, completed rehab     Hyperlipidemia      Insomnia      Lyme disease 1990     Pain in shoulder 3/16/2010     Seasonal allergies      Tobacco use disorders      Type 2 diabetes, HbA1c goal < 7% (H) 7/18/2013       Past Surgical History:   Procedure Laterality Date     C SHOULDER SURG PROC UNLISTED  1992    X 3 (broken clavicle and rotator cuff tear with impingement     HC SACROPLASTY  1990's    Herniated L4-L5 X 2     HYSTERECTOMY, PAP NO LONGER INDICATED  2005     HYSTERECTOMY, VAGINAL  6/28/05    Ovaries in Place       Family History   Problem Relation Age of Onset     DIABETES Mother      Hypertension Mother      Gynecology Mother      Arthritis Mother      Thyroid Disease Mother      Allergies Mother      Lipids Father      HEART DISEASE Father      C.A.D. Father      Hypertension Maternal Grandmother      Arthritis Maternal Grandmother      " CANCER Maternal Grandmother      CANCER Maternal Grandfather      C.APITER. Maternal Grandfather      Asthma Paternal Grandmother      C.APITER. Paternal Grandmother      CEREBROVASCULAR DISEASE Paternal Grandfather      Alzheimer Disease Paternal Grandfather      Arthritis Paternal Grandfather      C.A.D. Paternal Grandfather      Cardiovascular Paternal Grandfather      Circulatory Paternal Grandfather      Glaucoma No family hx of      Macular Degeneration No family hx of        Social History   Substance Use Topics     Smoking status: Current Every Day Smoker     Packs/day: 1.50     Years: 30.00     Types: Cigarettes     Smokeless tobacco: Never Used      Comment: 1/2 pack to 1 ppd, has an ecig     Alcohol use 3.0 - 6.0 oz/week     5 - 10 Standard drinks or equivalent per week      Comment: 2beers every few fews         Current Outpatient Prescriptions:      busPIRone (BUSPAR) 15 MG tablet, Take 1 tablet (15 mg) by mouth 2 times daily, Disp: 180 tablet, Rfl: 0     traZODone (DESYREL) 100 MG tablet, Take 2 tablets (200 mg) by mouth At Bedtime, Disp: 180 tablet, Rfl: 1     loratadine (CLARITIN) 10 MG tablet, Take 1 tablet (10 mg) by mouth daily, Disp: 90 tablet, Rfl: 1     oxybutynin (DITROPAN-XL) 5 MG 24 hr tablet, Take 1 tablet (5 mg) by mouth daily, Disp: 90 tablet, Rfl: 1     omeprazole (PRILOSEC) 20 MG CR capsule, Take 1 capsule (20 mg) by mouth daily as needed, Disp: 90 capsule, Rfl: 1     methocarbamol (ROBAXIN) 500 MG tablet, Take 2 tablets (1,000 mg) by mouth 3 times daily as needed for muscle spasms, Disp: 270 tablet, Rfl: 1     gabapentin (NEURONTIN) 300 MG capsule, TAKE 3 CAPSULES BY MOUTH THREE TIMES DAILY, Disp: 810 capsule, Rfl: 1     simvastatin (ZOCOR) 40 MG tablet, Take 1 tablet (40 mg) by mouth At Bedtime, Disp: 90 tablet, Rfl: 1     metFORMIN (GLUCOPHAGE-XR) 500 MG 24 hr tablet, Take 4 tablets (2,000 mg) by mouth daily (with dinner), Disp: 360 tablet, Rfl: 1     diclofenac (VOLTAREN) 75 MG EC tablet,  "TAKE 1 TABLET BY MOUTH TWICE DAILY AS NEEDED FOR MODERATE PAIN., Disp: 180 tablet, Rfl: 1     buPROPion (WELLBUTRIN SR) 150 MG 12 hr tablet, Take 1 tablet (150 mg) by mouth 2 times daily, Disp: 180 tablet, Rfl: 1     fluticasone (FLONASE) 50 MCG/ACT spray, Spray 2 sprays into both nostrils daily, Disp: 48 g, Rfl: 1     aspirin 81 MG tablet, Take 1 tablet (81 mg) by mouth At Bedtime, Disp: 90 tablet, Rfl: 3     mometasone-formoterol (DULERA) 200-5 MCG/ACT oral inhaler, Inhale 2 puffs into the lungs 2 times daily, Disp: 3 Inhaler, Rfl: 1     albuterol (VENTOLIN HFA) 108 (90 BASE) MCG/ACT Inhaler, INHALE 2 PUFFS EVERY 6 HOURS AS NEEDED FOR SHORTNESS OF BREATH OR DYSPNEA, Disp: 36 g, Rfl: 1     lidocaine (LIDODERM) 5 % Patch, APPLY 1 PATCH ONTO SKIN. WEAR FOR 12 HOURS THEN PATCH FREE FOR 12 HOURS, Disp: 90 patch, Rfl: 1     order for DME, 1 glucometer per insurance formulary, Disp: 1 Device, Rfl: 0     order for DME, Diabetic test strips and lancets per insurance formulary Check blood sugar once per day, Disp: 3 Month, Rfl: 1       Allergies   Allergen Reactions     Quetiapine Anaphylaxis     Seroquel [Quetiapine Fumarate]      Insomnia  , anhedonia       Ambien [Zolpidem Tartrate] Difficulty breathing     Reaction after taking 5 mg on Wed, Thurs, then drank Friday afternoon and developed symptoms,  Irrability.       Zolpidem Unknown       Pulse 114  Ht 5' 4\" (1.626 m)  Wt 190 lb (86.2 kg)  SpO2 95%  BMI 32.61 kg/m2.      Constitutional/ general:  Pt is in no apparent distress, appears well-nourished.  Cooperative with history and physical exam.     Psych:  The patient answered questions appropriately.  Normal affect.  Seems to have reasonable expectations, in terms of treatment.     Eyes:  Visual scanning/ tracking without deficit.     Ears:  Response to auditory stimuli is normal.  No  hearing aid devices.  Auricles in proper alignment.     Lymphatic:  Popliteal lymph nodes not enlarged.     Lungs:  Non labored " breathing, non labored speech. No cough.  No audible wheezing. Even, quiet breathing.       Vascular:  Pedal pulses are palpable bilaterally for both the DP and PT arteries.  CFT < 3 sec.  No ankle varicosities or edema.  Pedal hair growth noted.     Neuro:  Alert and oriented x 3. Coordinated gait.  Light touch sensation is intact to the L4, L5, S1 distributions. No obvious deficits.  No evidence of neurological-based weakness, spasticity, or contracture in the lower extremities.     Derm: Normal texture and turgor.  No ecchymosis, or cyanosis.  Hair growth noted.        Musculoskeletal:     Patient is ambulatory without an assistive device or brace.  No gross deformities.  Normal arch with weightbearing.  No forefoot or rear foot deformities noted.  MS 5/5 all compartments.  Normal ROM all fore foot and rearfoot joints.  No equinus.  right great toe nail lateral border shows soft tissue impingement with localized erythema.   negative active drainage/purulence at this time.  No sinus tracts.  No nailbed masses or exostosis.  No pain with range of motion of IPJ or MTPJ.  No ascending cellulitis.    ASSESSMENT:    Onychocryptosis with paronychia right toe.    Discussed etiology and treatment options in detail w/ the pt.  The potential causes and nature of an ingrown toenail were discussed with the patient.  We reviewed the natural history/prognosis of the condition and potential risks if no treatment is provided.      Treatment options discussed included conservative management (oral antibiotics, soaking of foot, adequate width shoes)  as well as surgical management (partial or total nail removal).  The pros and cons of both forms of treatment were reviewed.  Handout given to patient.      After thorough discussion and answering all questions, the patient elected to have nail avulsion.  Obtained consent, used 3cc of 1% lidocaine plain to block right first toe.  Sterile prep, then avulsed the affected border(s).  No  evidence of deep abscess noted.  Pt tolerated procedure well.  Sterile bandage placed, gave wound care instruction.  Return to clinic prn.  Thank you for allowing me participate in the care of this patient.        Cain Epstein DPM, FACFAS

## 2017-12-13 NOTE — LETTER
"    12/13/2017         RE: Pily Roche  [address]  [facility] [address]        Dear Colleague,    Thank you for referring your patient, Pily Roche, to the [facility]. Please see a copy of my visit note below.    Subjective:    Pt is seen today in consult from Kath Fierro w/ the c/c of a painful ingrown right great nail lateral border.  This has been problematic for 3 month(s).  positivehistory of drainage from the site. This is slowly getting worse.  Aggravated by activity and relieved by rest.  Has tried soaking which has not helped.   denies history of trauma to the area.  Denies fever and chill, denies numbness and tingling, denies erythema on dorsum of foot.  Works as PCA.  Smoker.  Diabetes, but no numbness in toes.       ROS:  A 10-point review of systems was performed and is positive for that noted in the HPI and as seen below.  All other areas are negative.     Past Medical History:   Diagnosis Date     Advanced directives, counseling/discussion 2/26/2013    Patient does not have an Advance/Health Care Directive (HCD), given \"What is Advance Care Planning?\" flyer.  Maureen Iglesais February 26, 2013      Arthritis      Disorders of bursae and tendons in shoulder region, unspecified 6/8/2010     Dysfunctional uterine bleeding      History of substance abuse 2006    cocaine, completed rehab     Hyperlipidemia      Insomnia      Lyme disease 1990     Pain in shoulder 3/16/2010     Seasonal allergies      Tobacco use disorders      Type 2 diabetes, HbA1c goal < 7% (H) 7/18/2013       Past Surgical History:   Procedure Laterality Date     C SHOULDER SURG PROC UNLISTED  1992    X 3 (broken clavicle and rotator cuff tear with impingement     HC SACROPLASTY  1990's    Herniated L4-L5 X 2     HYSTERECTOMY, PAP NO LONGER INDICATED  2005     HYSTERECTOMY, VAGINAL  6/28/05    Ovaries in Place       Family History   Problem Relation Age of Onset     DIABETES Mother      " Hypertension Mother      Gynecology Mother      Arthritis Mother      Thyroid Disease Mother      Allergies Mother      Lipids Father      HEART DISEASE Father      C.A.D. Father      Hypertension Maternal Grandmother      Arthritis Maternal Grandmother      CANCER Maternal Grandmother      CANCER Maternal Grandfather      C.A.D. Maternal Grandfather      Asthma Paternal Grandmother      C.A.D. Paternal Grandmother      CEREBROVASCULAR DISEASE Paternal Grandfather      Alzheimer Disease Paternal Grandfather      Arthritis Paternal Grandfather      C.A.D. Paternal Grandfather      Cardiovascular Paternal Grandfather      Circulatory Paternal Grandfather      Glaucoma No family hx of      Macular Degeneration No family hx of        Social History   Substance Use Topics     Smoking status: Current Every Day Smoker     Packs/day: 1.50     Years: 30.00     Types: Cigarettes     Smokeless tobacco: Never Used      Comment: 1/2 pack to 1 ppd, has an ecig     Alcohol use 3.0 - 6.0 oz/week     5 - 10 Standard drinks or equivalent per week      Comment: 2beers every few fews         Current Outpatient Prescriptions:      busPIRone (BUSPAR) 15 MG tablet, Take 1 tablet (15 mg) by mouth 2 times daily, Disp: 180 tablet, Rfl: 0     traZODone (DESYREL) 100 MG tablet, Take 2 tablets (200 mg) by mouth At Bedtime, Disp: 180 tablet, Rfl: 1     loratadine (CLARITIN) 10 MG tablet, Take 1 tablet (10 mg) by mouth daily, Disp: 90 tablet, Rfl: 1     oxybutynin (DITROPAN-XL) 5 MG 24 hr tablet, Take 1 tablet (5 mg) by mouth daily, Disp: 90 tablet, Rfl: 1     omeprazole (PRILOSEC) 20 MG CR capsule, Take 1 capsule (20 mg) by mouth daily as needed, Disp: 90 capsule, Rfl: 1     methocarbamol (ROBAXIN) 500 MG tablet, Take 2 tablets (1,000 mg) by mouth 3 times daily as needed for muscle spasms, Disp: 270 tablet, Rfl: 1     gabapentin (NEURONTIN) 300 MG capsule, TAKE 3 CAPSULES BY MOUTH THREE TIMES DAILY, Disp: 810 capsule, Rfl: 1     simvastatin  "(ZOCOR) 40 MG tablet, Take 1 tablet (40 mg) by mouth At Bedtime, Disp: 90 tablet, Rfl: 1     metFORMIN (GLUCOPHAGE-XR) 500 MG 24 hr tablet, Take 4 tablets (2,000 mg) by mouth daily (with dinner), Disp: 360 tablet, Rfl: 1     diclofenac (VOLTAREN) 75 MG EC tablet, TAKE 1 TABLET BY MOUTH TWICE DAILY AS NEEDED FOR MODERATE PAIN., Disp: 180 tablet, Rfl: 1     buPROPion (WELLBUTRIN SR) 150 MG 12 hr tablet, Take 1 tablet (150 mg) by mouth 2 times daily, Disp: 180 tablet, Rfl: 1     fluticasone (FLONASE) 50 MCG/ACT spray, Spray 2 sprays into both nostrils daily, Disp: 48 g, Rfl: 1     aspirin 81 MG tablet, Take 1 tablet (81 mg) by mouth At Bedtime, Disp: 90 tablet, Rfl: 3     mometasone-formoterol (DULERA) 200-5 MCG/ACT oral inhaler, Inhale 2 puffs into the lungs 2 times daily, Disp: 3 Inhaler, Rfl: 1     albuterol (VENTOLIN HFA) 108 (90 BASE) MCG/ACT Inhaler, INHALE 2 PUFFS EVERY 6 HOURS AS NEEDED FOR SHORTNESS OF BREATH OR DYSPNEA, Disp: 36 g, Rfl: 1     lidocaine (LIDODERM) 5 % Patch, APPLY 1 PATCH ONTO SKIN. WEAR FOR 12 HOURS THEN PATCH FREE FOR 12 HOURS, Disp: 90 patch, Rfl: 1     order for DME, 1 glucometer per insurance formulary, Disp: 1 Device, Rfl: 0     order for DME, Diabetic test strips and lancets per insurance formulary Check blood sugar once per day, Disp: 3 Month, Rfl: 1       Allergies   Allergen Reactions     Quetiapine Anaphylaxis     Seroquel [Quetiapine Fumarate]      Insomnia  , anhedonia       Ambien [Zolpidem Tartrate] Difficulty breathing     Reaction after taking 5 mg on Wed, Thurs, then drank Friday afternoon and developed symptoms,  Irrability.       Zolpidem Unknown       Pulse 114  Ht 5' 4\" (1.626 m)  Wt 190 lb (86.2 kg)  SpO2 95%  BMI 32.61 kg/m2.      Constitutional/ general:  Pt is in no apparent distress, appears well-nourished.  Cooperative with history and physical exam.     Psych:  The patient answered questions appropriately.  Normal affect.  Seems to have reasonable expectations, " in terms of treatment.     Eyes:  Visual scanning/ tracking without deficit.     Ears:  Response to auditory stimuli is normal.  No  hearing aid devices.  Auricles in proper alignment.     Lymphatic:  Popliteal lymph nodes not enlarged.     Lungs:  Non labored breathing, non labored speech. No cough.  No audible wheezing. Even, quiet breathing.       Vascular:  Pedal pulses are palpable bilaterally for both the DP and PT arteries.  CFT < 3 sec.  No ankle varicosities or edema.  Pedal hair growth noted.     Neuro:  Alert and oriented x 3. Coordinated gait.  Light touch sensation is intact to the L4, L5, S1 distributions. No obvious deficits.  No evidence of neurological-based weakness, spasticity, or contracture in the lower extremities.     Derm: Normal texture and turgor.  No ecchymosis, or cyanosis.  Hair growth noted.        Musculoskeletal:     Patient is ambulatory without an assistive device or brace.  No gross deformities.  Normal arch with weightbearing.  No forefoot or rear foot deformities noted.  MS 5/5 all compartments.  Normal ROM all fore foot and rearfoot joints.  No equinus.  right great toe nail lateral border shows soft tissue impingement with localized erythema.   negative active drainage/purulence at this time.  No sinus tracts.  No nailbed masses or exostosis.  No pain with range of motion of IPJ or MTPJ.  No ascending cellulitis.    ASSESSMENT:    Onychocryptosis with paronychia right toe.    Discussed etiology and treatment options in detail w/ the pt.  The potential causes and nature of an ingrown toenail were discussed with the patient.  We reviewed the natural history/prognosis of the condition and potential risks if no treatment is provided.      Treatment options discussed included conservative management (oral antibiotics, soaking of foot, adequate width shoes)  as well as surgical management (partial or total nail removal).  The pros and cons of both forms of treatment were reviewed.   Handout given to patient.      After thorough discussion and answering all questions, the patient elected to have nail avulsion.  Obtained consent, used 3cc of 1% lidocaine plain to block right first toe.  Sterile prep, then avulsed the affected border(s).  No evidence of deep abscess noted.  Pt tolerated procedure well.  Sterile bandage placed, gave wound care instruction.  Return to clinic prn.  Thank you for allowing me participate in the care of this patient.        Cain Epstein DPM, FACFAS      Again, thank you for allowing me to participate in the care of your patient.        Sincerely,        Cain Epstein DPM

## 2017-12-13 NOTE — MR AVS SNAPSHOT
After Visit Summary   12/13/2017    Pily Roche    MRN: [id number]           Patient Information     Date Of Birth          1960        Visit Information        Provider Department      12/13/2017 1:15 PM Cain Epstein DPM [facility]        Today's Diagnoses     Ingrowing nail    -  1      Care Instructions    SMOKING CESSATION    What's in cigarette smoke? - Cigarette smoke contains over 4,000 chemicals. Nicotine is one of the main ingredients which is an insecticide/herbicide. It is poisonous to our nervous system, increases blood clotting risk, and decreases the body's defenses to fight off infection. Another chemical is Carbon Monoxide is an asphyxiating gas that permanently binds to blood cells and blocks the transport of oxygen. This leads to tissue death and decreases your metabolism. Tar is a chemical that coats your lungs and trachea which impairs new oxygen coming in and carbon dioxide getting out of your body.   How does smoking impact surgery? - Smoking is particularly hazardous with regards to surgery. Surgery puts stress on the body and a smoker's body is already under strain from these chemicals. Putting the two together, especially for an elective surgery, could be a recipe for disaster. Smoking before and after surgery increases your risk of heart problems, slow wound healing, delayed bone healing, blood clots, wound infection and anesthesia complications.   What are the benefits of quitting? - In 20 minutes your blood pressure will drop back down to normal. In 8 hours the carbon monoxide (a toxic gas) levels in your blood stream will drop by half, and oxygen levels will return to normal. In 48 hours your chance of having a heart attack will have decreased. All nicotine will have left your body. Your sense of taste and smell will return to a normal level. In 72 hours your bronchial tubes will relax, and your energy levels will increase. In 2 weeks  your circulation will increase, and it will continue to improve for the next 10 weeks.    Recommendations for elective surgery - Ideally, patients should quit smoking 8 weeks before and at least 2 weeks after elective surgery in order to avoid complications. Simply cutting back on the amount of cigarettes smoked per day does not offer any benefit or decrease the risk of poor wound healing, heart problems, and infection. Smokers should also start taking Vitamin C and B for two weeks before surgery and two weeks after surgery.    Ways to Stop Smokin. Nicotine patches, lozenges, or gum  2. Support Groups  3. Medications (see below)    List of Medications:  1. Varenicline Tartrate (CHANTIX)   2. Bupropion HCL (WELLBUTRIN, ZYBAN) - note: make sure Wellbutrin is for smoking cessation and not other issues   3. Nicotine Patch (NICODERM)   4. Nicotine Inhaler (NICOTROL)   5. Nicotine Gum Nicotine Polacrilex   6. Nicotine Lozenge: Nicotine Polacrilex (COMMIT)   * [facility] offers a smoking support group as well!  Please visit: [email or web address]  If you are interested in these, ask about getting a prescription or talk to your primary care doctor about what may be the best way for you to quit.       We wish you continued good healing. If you have any questions or concerns, please do not hesitate to contact us at [phone]      Please remember to call and schedule a follow up appointment if one was recommended at your earliest convenience.   PODIATRY CLINIC HOURS  TELEPHONE NUMBER    Dr. Cain Epstein D.P.M [facility]    Clinics:  [place]  [facility]        Dottie Jade MA  Medical Assistant  Tuesday 1PM-6PM  [place]  Wednesday 7AM-2PM  [place]  Thursday 10AM-6PM  [place] 7AM-345PM  [place]  Specialty schedulers:   [phone] to make an appointment with any Specialty Provider.        Urgent Care  "locations:    [facility] Monday-Friday 5 pm - 9 pm. Saturday-Sunday 9 am -5pm    Monday-Friday 11 am - 9 pm Saturday 9 am - 5 pm     Monday-Sunday 12 noon-8PM [phone]     If you need a medication refill, please contact us you may need lab work and/or a follow up visit prior to your refill (i.e. Antifungal medications).    [employer] (secure e-mail communication and access to your chart) to send a message or to make an appointment.    If MRI needed please call [facility] at [phone]        Weight management plan: Patient was referred to their PCP to discuss a diet and exercise plan.            Follow-ups after your visit        Who to contact     If you have questions or need follow up information about today's clinic visit or your schedule please contact [facility] directly at [phone].  Normal or non-critical lab and imaging results will be communicated to you by MyChart, letter or phone within 4 business days after the clinic has received the results. If you do not hear from us within 7 days, please contact the clinic through [employer] or phone. If you have a critical or abnormal lab result, we will notify you by phone as soon as possible.  Submit refill requests through [employer] or call your pharmacy and they will forward the refill request to us. Please allow 3 business days for your refill to be completed.          Additional Information About Your Visit        [employer] Information     [employer] lets you send messages to your doctor, view your test results, renew your prescriptions, schedule appointments and more. To sign up, go to [email or web address] . Click on \"Log in\" on the left side of the screen, which will take you to the Welcome page. Then click on \"Sign up Now\" on the right side of the page.     You will be asked to enter the access code listed below, as well as some personal information. " "Please follow the directions to create your username and password.     Your access code is: [email or web address]  Expires: 2018 11:50 AM     Your access code will  in 90 days. If you need help or a new code, please call your [place] clinic or [phone].        Care EveryWhere ID     This is your Care EveryWhere ID. This could be used by other organizations to access your [place] medical records  [id number]        Your Vitals Were     Pulse Height Pulse Oximetry BMI (Body Mass Index)          114 5' 4\" (1.626 m) 95% 32.61 kg/m2         Blood Pressure from Last 3 Encounters:   17 119/78   17 122/84   17 (!) 145/92    Weight from Last 3 Encounters:   17 190 lb (86.2 kg)   17 191 lb 6.4 oz (86.8 kg)   17 185 lb 3.2 oz (84 kg)              We Performed the Following     REMOVAL OF NAIL PLATE SIMPLE SINGLE        Primary Care Provider Office Phone # Fax #    Kath Fierro PA-C [phone]       [address]  [facility] [address]        Equal Access to Services     KARISSA CARDENAS : Hadii aad ku hadasho Soomaali, waaxda luqadaha, qaybta kaalmada adeegyada, waxay idiin haymessin karla wells. So [facility] [phone].    ATENCIÓN: Si habla español, tiene a street disposición servicios gratuitos de asistencia lingüística. Llame al [phone].    We comply with applicable federal civil rights laws and [place] laws. We do not discriminate on the basis of race, color, national origin, age, disability, sex, sexual orientation, or gender identity.            Thank you!     Thank you for choosing [facility]  for your care. Our goal is always to provide you with excellent care. Hearing back from our patients is one way we can continue to improve our services. Please take a few minutes to complete the written survey that you may receive in the mail after your visit with us. Thank you!             Your Updated Medication List - Protect others " around you: Learn how to safely use, store and throw away your medicines at www.disposemymeds.org.          This list is accurate as of: 12/13/17  1:27 PM.  Always use your most recent med list.                   Brand Name Dispense Instructions for use Diagnosis    albuterol 108 (90 BASE) MCG/ACT Inhaler    VENTOLIN HFA    36 g    INHALE 2 PUFFS EVERY 6 HOURS AS NEEDED FOR SHORTNESS OF BREATH OR DYSPNEA    Chronic bronchitis with COPD (chronic obstructive pulmonary disease) (H)       aspirin 81 MG tablet     90 tablet    Take 1 tablet (81 mg) by mouth At Bedtime    Type 2 diabetes mellitus with stage 2 chronic kidney disease, without long-term current use of insulin (H)       buPROPion 150 MG 12 hr tablet    WELLBUTRIN SR    180 tablet    Take 1 tablet (150 mg) by mouth 2 times daily    Tobacco use disorder, Major depressive disorder, recurrent episode, moderate (H)       busPIRone 15 MG tablet    BUSPAR    180 tablet    Take 1 tablet (15 mg) by mouth 2 times daily    GUDELIA (generalized anxiety disorder)       diclofenac 75 MG EC tablet    VOLTAREN    180 tablet    TAKE 1 TABLET BY MOUTH TWICE DAILY AS NEEDED FOR MODERATE PAIN.    Chronic shoulder pain, unspecified laterality       fluticasone 50 MCG/ACT spray    FLONASE    48 g    Spray 2 sprays into both nostrils daily    LPRD (laryngopharyngeal reflux disease)       gabapentin 300 MG capsule    NEURONTIN    810 capsule    TAKE 3 CAPSULES BY MOUTH THREE TIMES DAILY    Chronic shoulder pain, unspecified laterality       lidocaine 5 % Patch    LIDODERM    90 patch    APPLY 1 PATCH ONTO SKIN. WEAR FOR 12 HOURS THEN PATCH FREE FOR 12 HOURS    Chronic shoulder pain, unspecified laterality       loratadine 10 MG tablet    CLARITIN    90 tablet    Take 1 tablet (10 mg) by mouth daily    LPRD (laryngopharyngeal reflux disease)       metFORMIN 500 MG 24 hr tablet    GLUCOPHAGE-XR    360 tablet    Take 4 tablets (2,000 mg) by mouth daily (with dinner)    Type 2 diabetes  mellitus with stage 2 chronic kidney disease, without long-term current use of insulin (H)       methocarbamol 500 MG tablet    ROBAXIN    270 tablet    Take 2 tablets (1,000 mg) by mouth 3 times daily as needed for muscle spasms    Chronic shoulder pain, unspecified laterality       mometasone-formoterol 200-5 MCG/ACT oral inhaler    DULERA    3 Inhaler    Inhale 2 puffs into the lungs 2 times daily    Chronic bronchitis with COPD (chronic obstructive pulmonary disease) (H)       omeprazole 20 MG CR capsule    priLOSEC    90 capsule    Take 1 capsule (20 mg) by mouth daily as needed    LPRD (laryngopharyngeal reflux disease)       * order for DME     1 Device    1 glucometer per insurance formulary    Type 2 diabetes mellitus without retinopathy (H)       * order for DME     3 Month    Diabetic test strips and lancets per insurance formulary Check blood sugar once per day    Type 2 diabetes mellitus without retinopathy (H)       oxybutynin 5 MG 24 hr tablet    DITROPAN-XL    90 tablet    Take 1 tablet (5 mg) by mouth daily    Urinary incontinence without sensory awareness       simvastatin 40 MG tablet    ZOCOR    90 tablet    Take 1 tablet (40 mg) by mouth At Bedtime    Hyperlipidemia LDL goal <100       traZODone 100 MG tablet    DESYREL    180 tablet    Take 2 tablets (200 mg) by mouth At Bedtime    Major depressive disorder, recurrent episode, moderate (H)       * Notice:  This list has 2 medication(s) that are the same as other medications prescribed for you. Read the directions carefully, and ask your doctor or other care provider to review them with you.

## 2017-12-13 NOTE — PATIENT INSTRUCTIONS
SMOKING CESSATION    What's in cigarette smoke? - Cigarette smoke contains over 4,000 chemicals. Nicotine is one of the main ingredients which is an insecticide/herbicide. It is poisonous to our nervous system, increases blood clotting risk, and decreases the body's defenses to fight off infection. Another chemical is Carbon Monoxide is an asphyxiating gas that permanently binds to blood cells and blocks the transport of oxygen. This leads to tissue death and decreases your metabolism. Tar is a chemical that coats your lungs and trachea which impairs new oxygen coming in and carbon dioxide getting out of your body.   How does smoking impact surgery? - Smoking is particularly hazardous with regards to surgery. Surgery puts stress on the body and a smoker's body is already under strain from these chemicals. Putting the two together, especially for an elective surgery, could be a recipe for disaster. Smoking before and after surgery increases your risk of heart problems, slow wound healing, delayed bone healing, blood clots, wound infection and anesthesia complications.   What are the benefits of quitting? - In 20 minutes your blood pressure will drop back down to normal. In 8 hours the carbon monoxide (a toxic gas) levels in your blood stream will drop by half, and oxygen levels will return to normal. In 48 hours your chance of having a heart attack will have decreased. All nicotine will have left your body. Your sense of taste and smell will return to a normal level. In 72 hours your bronchial tubes will relax, and your energy levels will increase. In 2 weeks your circulation will increase, and it will continue to improve for the next 10 weeks.    Recommendations for elective surgery - Ideally, patients should quit smoking 8 weeks before and at least 2 weeks after elective surgery in order to avoid complications. Simply cutting back on the amount of cigarettes smoked per day does not offer any benefit or decrease the  risk of poor wound healing, heart problems, and infection. Smokers should also start taking Vitamin C and B for two weeks before surgery and two weeks after surgery.    Ways to Stop Smokin. Nicotine patches, lozenges, or gum  2. Support Groups  3. Medications (see below)    List of Medications:  1. Varenicline Tartrate (CHANTIX)   2. Bupropion HCL (WELLBUTRIN, ZYBAN)   note: make sure Wellbutrin is for smoking cessation and not other issues   3. Nicotine Patch (NICODERM)   4. Nicotine Inhaler (NICOTROL)   5. Nicotine Gum Nicotine Polacrilex   6. Nicotine Lozenge: Nicotine Polacrilex (COMMIT)   * [employer] offers a smoking support group as well!  Please visit: [email or web address]  If you are interested in these, ask about getting a prescription or talk to your primary care doctor about what may be the best way for you to quit.       We wish you continued good healing. If you have any questions or concerns, please do not hesitate to contact us at [phone]      Please remember to call and schedule a follow up appointment if one was recommended at your earliest convenience.   PODIATRY CLINIC HOURS  TELEPHONE NUMBER    Dr. Cain CLARKEPNOY FAC FAS    Clinics:  [facility]        Dottie Jade MA  Medical Assistant  Tuesday 1PM-6PM  [facility]  Wednesday 7AM-2PM  [place]  Thursday 10AM-6PM  [place] 7AM-345PM  [place]  Specialty schedulers:   [phone] to make an appointment with any Specialty Provider.        Urgent Care locations:    [facility] Monday-Friday 5 pm - 9 pm. Saturday- 9 am -5pm    Monday-Friday 11 am - 9 pm Saturday 9 am - 5 pm     Monday- 12 noon-8PM [phone]     If you need a medication refill, please contact us you may need lab work and/or a follow up visit prior to your refill (i.e. Antifungal  medications).    [employer] (secure e-mail communication and access to your chart) to send a message or to make an appointment.    If MRI needed please call [facility] at [phone]        Weight management plan: Patient was referred to their PCP to discuss a diet and exercise plan.

## 2018-03-03 ENCOUNTER — HEALTH MAINTENANCE LETTER (OUTPATIENT)
Age: 58
End: 2018-03-03

## 2018-04-05 DIAGNOSIS — F41.1 GAD (GENERALIZED ANXIETY DISORDER): ICD-10-CM

## 2018-04-05 NOTE — TELEPHONE ENCOUNTER
"Requested Prescriptions   Pending Prescriptions Disp Refills     busPIRone (BUSPAR) 15 MG tablet 180 tablet 0    Last Written Prescription Date:  12/04/2017 #180 x 0  Last filled 02/18/2018  Last office visit: 12/4/2017 REGINALD Fierro   Future Office Visit:  None   Sig: Take 1 tablet (15 mg) by mouth 2 times daily    Atypical Antidepressants Protocol Passed    4/5/2018 11:06 AM       Passed - Recent (12 mo) or future (30 days) visit within the authorizing provider's specialty    Patient had office visit in the last 12 months or has a visit in the next 30 days with authorizing provider or within the authorizing provider's specialty.  See \"Patient Info\" tab in inbasket, or \"Choose Columns\" in Meds & Orders section of the refill encounter.           Passed - Patient is age 18 or older       Passed - No active pregnancy on record       Passed - No positive pregnancy test in past 12 mos          "

## 2018-04-06 RX ORDER — BUSPIRONE HYDROCHLORIDE 15 MG/1
15 TABLET ORAL 2 TIMES DAILY
Qty: 180 TABLET | Refills: 1 | Status: SHIPPED | OUTPATIENT
Start: 2018-04-06 | End: 2019-06-10

## 2018-04-06 NOTE — TELEPHONE ENCOUNTER
Prescription approved per [facility] Refill Protocol.  Leyla Rodriguez RN

## 2018-04-13 ENCOUNTER — RADIANT APPOINTMENT (OUTPATIENT)
Dept: MAMMOGRAPHY | Facility: CLINIC | Age: 58
End: 2018-04-13
Payer: COMMERCIAL

## 2018-04-13 DIAGNOSIS — Z12.31 VISIT FOR SCREENING MAMMOGRAM: ICD-10-CM

## 2018-04-13 PROCEDURE — 77067 SCR MAMMO BI INCL CAD: CPT | Mod: TC

## 2018-05-17 DIAGNOSIS — E11.9 TYPE 2 DIABETES, HBA1C GOAL < 7% (H): Primary | ICD-10-CM

## 2018-06-29 DIAGNOSIS — M25.519 CHRONIC SHOULDER PAIN, UNSPECIFIED LATERALITY: ICD-10-CM

## 2018-06-29 DIAGNOSIS — G89.29 CHRONIC SHOULDER PAIN, UNSPECIFIED LATERALITY: ICD-10-CM

## 2018-06-29 RX ORDER — METHOCARBAMOL 500 MG/1
1000 TABLET, FILM COATED ORAL 3 TIMES DAILY PRN
Qty: 270 TABLET | Refills: 1 | Status: SHIPPED | OUTPATIENT
Start: 2018-06-29 | End: 2018-10-18

## 2018-06-29 NOTE — TELEPHONE ENCOUNTER
Methocarbamol        Last Written Prescription Date:  12/4/2017  Last Fill Quantity: 270,   # refills: 1  Last Office Visit: 12/4/2017 JS  Future Office visit:       Routing refill request to provider for review/approval because:  brendon

## 2018-06-29 NOTE — TELEPHONE ENCOUNTER
Routing refill request to provider for review/approval because:  Drug not on the [facility] refill protocol   Brandi BRUNNER RN

## 2018-07-11 DIAGNOSIS — F33.1 MAJOR DEPRESSIVE DISORDER, RECURRENT EPISODE, MODERATE (H): ICD-10-CM

## 2018-07-11 RX ORDER — TRAZODONE HYDROCHLORIDE 100 MG/1
200 TABLET ORAL AT BEDTIME
Qty: 180 TABLET | Refills: 0 | Status: SHIPPED | OUTPATIENT
Start: 2018-07-11 | End: 2018-12-04

## 2018-07-11 NOTE — TELEPHONE ENCOUNTER
traZODone (DESYREL) 100 MG tablet  Last Written Prescription Date:  12/4/17  Last Fill Quantity: 180,  # refills: 1   Last office visit: 12/4/2017 with prescribing provider:  12/4/17 francisco   Future Office Visit:

## 2018-07-11 NOTE — TELEPHONE ENCOUNTER
Routing refill request to provider for review/approval because:  Last notes indicated a 1-2 month follow-up was requested. Marichuy Castillo RN

## 2018-07-18 DIAGNOSIS — M25.519 CHRONIC SHOULDER PAIN, UNSPECIFIED LATERALITY: ICD-10-CM

## 2018-07-18 DIAGNOSIS — G89.29 CHRONIC SHOULDER PAIN, UNSPECIFIED LATERALITY: ICD-10-CM

## 2018-07-19 RX ORDER — LIDOCAINE 50 MG/G
PATCH TOPICAL
Qty: 90 PATCH | Refills: 0 | Status: SHIPPED | OUTPATIENT
Start: 2018-07-19 | End: 2019-06-10

## 2018-07-19 NOTE — TELEPHONE ENCOUNTER
"Requested Prescriptions   Pending Prescriptions Disp Refills     lidocaine (LIDODERM) 5 % Patch [Pharmacy Med Name: LIDOCAINE 5% PATCH] 90 patch 0    Last Written Prescription Date:  06/21/2017 #90 x 1  Last filled 06/21/2017  Last office visit: 12/4/2017 REGINALD Fierro   Future Office Visit:  None   Sig: APPLY 1 PATCH ONTO SKIN. WEAR FOR 12 HOURS THEN PATCH FREE FOR 12 HOURS    Topical Anesthetic Agents Passed    7/18/2018  4:12 PM       Passed - Recent (12 mo) or future (30 days) visit within the authorizing provider's specialty    Patient had office visit in the last 12 months or has a visit in the next 30 days with authorizing provider or within the authorizing provider's specialty.  See \"Patient Info\" tab in inbasket, or \"Choose Columns\" in Meds & Orders section of the refill encounter.           Passed - Patient is age 2 or older          "

## 2018-07-26 DIAGNOSIS — N18.2 TYPE 2 DIABETES MELLITUS WITH STAGE 2 CHRONIC KIDNEY DISEASE, WITHOUT LONG-TERM CURRENT USE OF INSULIN (H): ICD-10-CM

## 2018-07-26 DIAGNOSIS — E11.22 TYPE 2 DIABETES MELLITUS WITH STAGE 2 CHRONIC KIDNEY DISEASE, WITHOUT LONG-TERM CURRENT USE OF INSULIN (H): ICD-10-CM

## 2018-07-26 NOTE — TELEPHONE ENCOUNTER
"Requested Prescriptions   Pending Prescriptions Disp Refills     metFORMIN (GLUCOPHAGE-XR) 500 MG 24 hr tablet [Pharmacy Med Name: METFORMIN ER 500MG 24HR TABS] 360 tablet 0    Last Written Prescription Date:  12/04/2017 #360 x 1  Last filled 04/12/2018  Last office visit: 12/4/2017 REGINALD Fierro   Future Office Visit:  None   Sig: TAKE 4 TABLET BY MOUTH DAILY.( WITH DINNER)    Biguanide Agents Failed    7/26/2018 12:37 PM       Failed - Blood pressure less than 140/90 in past 6 months    BP Readings from Last 3 Encounters:   12/04/17 119/78   11/27/17 122/84   09/27/17 (!) 145/92                Failed - Patient has documented LDL within the past 12 mos.    Recent Labs   Lab Test  07/07/17   0657   LDL  91            Failed - Patient has documented A1c within the specified period of time.    If HgbA1C is 8 or greater, it needs to be on file within the past 3 months.  If less than 8, must be on file within the past 6 months.     Recent Labs   Lab Test  12/04/17   0942   A1C  6.3*            Failed - Recent (6 mo) or future (30 days) visit within the authorizing provider's specialty    Patient had office visit in the last 6 months or has a visit in the next 30 days with authorizing provider or within the authorizing provider's specialty.  See \"Patient Info\" tab in inbasket, or \"Choose Columns\" in Meds & Orders section of the refill encounter.           Passed - Patient has had a Microalbumin in the past 12 mos.    Recent Labs   Lab Test  07/07/17   0705   MICROL  12   UMALCR  11.24            Passed - Patient is age 10 or older       Passed - Patient's CR is NOT>1.4 OR Patient's EGFR is NOT<45 within past 12 mos.    Recent Labs   Lab Test  12/04/17   0942   GFRESTIMATED  89   GFRESTBLACK  >90       Recent Labs   Lab Test  12/04/17   0942   CR  0.68            Passed - Patient does NOT have a diagnosis of CHF.       Passed - Patient is not pregnant       Passed - Patient has not had a positive pregnancy test within the " past 12 mos.

## 2018-07-27 RX ORDER — METFORMIN HCL 500 MG
TABLET, EXTENDED RELEASE 24 HR ORAL
Qty: 120 TABLET | Refills: 0 | Status: SHIPPED | OUTPATIENT
Start: 2018-07-27 | End: 2019-06-10

## 2018-07-27 NOTE — TELEPHONE ENCOUNTER
Medication is being filled for 1 time refill only due to:  Patient needs labs .. Patient needs to be seen because due for 6 month diabetic review. Pharmacy notes have a reminder placed on them.    CINTHIA Pool

## 2018-08-03 ENCOUNTER — TELEPHONE (OUTPATIENT)
Dept: FAMILY MEDICINE | Facility: CLINIC | Age: 58
End: 2018-08-03

## 2018-08-03 NOTE — LETTER
September 19, 2018      Pily Roche  [address]  [facility] [place] [address]        Dear ,    I have been unable to reach you by phone. Unfortunately, the Licoderm patches are not covered by your insurance.        If you have any questions or concerns, please call the clinic at the number listed above.       Sincerely,        Kath Fierro PA-C

## 2018-08-03 NOTE — TELEPHONE ENCOUNTER
Plan does not cover lidocaine (LIDODERM) 5 % Patch. Please call plan at [phone] to initiate prior authorization or call/fax pharmacy to change med.      Pt ID# [id number]    Genevieve Goldberg  [facility]

## 2018-08-07 NOTE — TELEPHONE ENCOUNTER
Please start a PA for lidoderm patches (there should be a previous request you can copy and pend please)  Kath Fierro PA-C

## 2018-08-07 NOTE — TELEPHONE ENCOUNTER
Unable to initiate a PA with information provided, need Third Party Rejection from a pharmacy.  Contacted [facility] for rejection.    Severiano Quach RT (r)  [facility]

## 2018-08-07 NOTE — TELEPHONE ENCOUNTER
Received a Third Party Rejection Notice from [facility] for Lidocaine Patch 5%    Routed request to the [employer] epa team.        Severiano DIALLO (r)  [facility]

## 2018-08-08 NOTE — TELEPHONE ENCOUNTER
Central Prior Authorization Team   Phone: [phone]      PA Initiation    Medication: Lidocaine Patch ([employer]s)-Initiated  Insurance Company: [employer] - Phone [phone] Fax [phone]  Pharmacy Filling the Rx: [facility] [id number] [address] [address] [facility] [address] [facility] [address] [facility]  Filling Pharmacy Phone: [phone]  Filling Pharmacy Fax:    Start Date: 8/8/2018

## 2018-08-10 NOTE — TELEPHONE ENCOUNTER
PRIOR AUTHORIZATION DENIED    Medication: Lidocaine Patch (ExpressScripts)-DENIED    Denial Date: 8/10/2018    Denial Rational: Lidoderm patches are only covered with the diagnosis of post-herpetic neuralgia, diabetic neuropathy, or cancer related pain. It will not be covered for the associated diagnosis.       Appeal Information:

## 2018-08-13 NOTE — TELEPHONE ENCOUNTER
PA denied based on Dx, this medication has a very narrow FDA approved Dx's.    If you wish to appeal use template ID [id number], they did not include the appeal time window in the scanned documentation.    Routed to provider.    Severiano Quach RT (r)  [facility]

## 2018-09-13 DIAGNOSIS — G89.29 CHRONIC SHOULDER PAIN, UNSPECIFIED LATERALITY: ICD-10-CM

## 2018-09-13 DIAGNOSIS — M25.519 CHRONIC SHOULDER PAIN, UNSPECIFIED LATERALITY: ICD-10-CM

## 2018-09-13 RX ORDER — DICLOFENAC SODIUM 75 MG/1
TABLET, DELAYED RELEASE ORAL
Qty: 180 TABLET | Refills: 0 | Status: SHIPPED | OUTPATIENT
Start: 2018-09-13 | End: 2019-06-10

## 2018-09-13 NOTE — TELEPHONE ENCOUNTER
"Requested Prescriptions   Pending Prescriptions Disp Refills     diclofenac (VOLTAREN) 75 MG EC tablet 180 tablet 1    Last Written Prescription Date:  12/04/2017 #180 x 1  Last filled 07/26/2018   Last office visit: 12/4/2017 REGINALD Fierro   Future Office Visit:  None   Sig: TAKE 1 TABLET BY MOUTH TWICE DAILY AS NEEDED FOR MODERATE PAIN.    NSAID Medications Failed    9/13/2018 11:48 AM       Failed - Normal ALT on file in past 12 months    Recent Labs   Lab Test  07/07/17   0657   ALT  38            Failed - Normal AST on file in past 12 months    Recent Labs   Lab Test  07/07/17   0657   AST  23            Failed - Normal CBC on file in past 12 months    Recent Labs   Lab Test  12/21/16   0734   WBC  8.5   RBC  4.39   HGB  13.4   HCT  41.8   PLT  250       For [facility] ONLY: ACCN126 = WBC, [id number] = RBC         Passed - Blood pressure under 140/90 in past 12 months    BP Readings from Last 3 Encounters:   12/04/17 119/78   11/27/17 122/84   09/27/17 (!) 145/92                Passed - Recent (12 mo) or future (30 days) visit within the authorizing provider's specialty    Patient had office visit in the last 12 months or has a visit in the next 30 days with authorizing provider or within the authorizing provider's specialty.  See \"Patient Info\" tab in inbasket, or \"Choose Columns\" in Meds & Orders section of the refill encounter.           Passed - Patient is age 6-64 years       Passed - No active pregnancy on record       Passed - Normal serum creatinine on file in past 12 months    Recent Labs   Lab Test  12/04/17   0942   CR  0.68            Passed - No positive pregnancy test in past 12 months          "

## 2018-10-17 DIAGNOSIS — E11.9 TYPE 2 DIABETES MELLITUS WITHOUT RETINOPATHY (H): Primary | ICD-10-CM

## 2018-10-18 DIAGNOSIS — K21.9 LPRD (LARYNGOPHARYNGEAL REFLUX DISEASE): ICD-10-CM

## 2018-10-18 DIAGNOSIS — M25.519 CHRONIC SHOULDER PAIN, UNSPECIFIED LATERALITY: ICD-10-CM

## 2018-10-18 DIAGNOSIS — G89.29 CHRONIC SHOULDER PAIN, UNSPECIFIED LATERALITY: ICD-10-CM

## 2018-10-18 RX ORDER — LORATADINE 10 MG/1
10 TABLET ORAL DAILY
Qty: 90 TABLET | Refills: 0 | Status: SHIPPED | OUTPATIENT
Start: 2018-10-18 | End: 2019-10-01

## 2018-10-18 RX ORDER — LANCETS 33 GAUGE
EACH MISCELLANEOUS
Qty: 100 EACH | Refills: 0 | Status: SHIPPED | OUTPATIENT
Start: 2018-10-18 | End: 2020-01-08

## 2018-10-18 NOTE — TELEPHONE ENCOUNTER
"Requested Prescriptions   Pending Prescriptions Disp Refills     loratadine (CLARITIN) 10 MG tablet 90 tablet 1    Last Written Prescription Date:  12/04/2017 #90 x 1  Last filled 08/22/2018  Last office visit: 12/4/2017 REGINALD Fierro   Future Office Visit:  None   Sig: Take 1 tablet (10 mg) by mouth daily    Antihistamines Protocol Passed    10/18/2018  9:14 AM       Passed - Patient is 3-64 years of age    Apply weight-based dosing for peds patients age 3 - 12 years of age.    Forward request to provider for patients under the age of 3 or over the age of 64.         Passed - Recent (12 mo) or future (30 days) visit within the authorizing provider's specialty    Patient had office visit in the last 12 months or has a visit in the next 30 days with authorizing provider or within the authorizing provider's specialty.  See \"Patient Info\" tab in inbasket, or \"Choose Columns\" in Meds & Orders section of the refill encounter.                "

## 2018-10-18 NOTE — TELEPHONE ENCOUNTER
Prescription approved per [facility] Refill Protocol.  Note attached to script to ask patient to schedule an appointment. Marichuy Castillo RN'

## 2018-10-18 NOTE — TELEPHONE ENCOUNTER
Requested Prescriptions   Pending Prescriptions Disp Refills     methocarbamol (ROBAXIN) 500 MG tablet 270 tablet 1     Sig: Take 2 tablets (1,000 mg) by mouth 3 times daily as needed for muscle spasms    There is no refill protocol information for this order        Last Written Prescription Date:  6/29/18  Last Fill Quantity: 270,  # refills: 1   Last office visit: 12/4/2017 with prescribing provider:  RENÉ   Future Office Visit:

## 2018-10-18 NOTE — TELEPHONE ENCOUNTER
"Requested Prescriptions   Pending Prescriptions Disp Refills     ONETOUCH DELICA LANCETS 33G MISC [Pharmacy Med Name: ONE TOUCH DELICA LANCETS 33G 100'S] 100 each 0    Last Written Prescription Date:  09/10/2013 #50 x 3  Last filled - no provided  Last office visit: 12/4/2017 REGINALD Fierro   Future Office Visit:  None    Note: Product is not on the current med list   Sig: TEST DAILY AS DIRECTED    Diabetic Supplies Protocol Failed    10/17/2018  3:56 PM       Failed - Recent (6 mo) or future (30 days) visit within the authorizing provider's specialty    Patient had office visit in the last 6 months or has a visit in the next 30 days with authorizing provider.  See \"Patient Info\" tab in inbasket, or \"Choose Columns\" in Meds & Orders section of the refill encounter.           Passed - Patient is 18 years of age or older          "

## 2018-10-18 NOTE — TELEPHONE ENCOUNTER
Prescription approved per [facility] Refill Protocol.  Marichuy Castillo RN

## 2018-10-19 RX ORDER — METHOCARBAMOL 500 MG/1
1000 TABLET, FILM COATED ORAL 3 TIMES DAILY PRN
Qty: 270 TABLET | Refills: 1 | Status: SHIPPED | OUTPATIENT
Start: 2018-10-19 | End: 2019-06-10

## 2018-10-19 NOTE — TELEPHONE ENCOUNTER
Requested Prescriptions   Pending Prescriptions Disp Refills     methocarbamol (ROBAXIN) 500 MG tablet 270 tablet 1     Sig: Take 2 tablets (1,000 mg) by mouth 3 times daily as needed for muscle spasms    There is no refill protocol information for this order        Routing refill request to provider for review/approval because:  Drug not on the [facility] refill protocol           Farheen Mir RN, BSN

## 2018-11-11 DIAGNOSIS — E78.5 HYPERLIPIDEMIA LDL GOAL <100: ICD-10-CM

## 2018-11-12 DIAGNOSIS — K21.9 LPRD (LARYNGOPHARYNGEAL REFLUX DISEASE): ICD-10-CM

## 2018-11-12 NOTE — TELEPHONE ENCOUNTER
"Requested Prescriptions   Pending Prescriptions Disp Refills     omeprazole (PRILOSEC) 20 MG CR capsule 90 capsule 1    Last Written Prescription Date:  12/04/2017 #90 x 1  Last filled 09/12/2018  Last office visit: 12/4/2017 REGINALD Fierro   Future Office Visit: None     Sig: Take 1 capsule (20 mg) by mouth daily as needed    PPI Protocol Passed    11/12/2018  2:57 PM       Passed - Not on Clopidogrel (unless Pantoprazole ordered)       Passed - No diagnosis of osteoporosis on record       Passed - Recent (12 mo) or future (30 days) visit within the authorizing provider's specialty    Patient had office visit in the last 12 months or has a visit in the next 30 days with authorizing provider or within the authorizing provider's specialty.  See \"Patient Info\" tab in inbasket, or \"Choose Columns\" in Meds & Orders section of the refill encounter.             Passed - Patient is age 18 or older       Passed - No active pregnacy on record       Passed - No positive pregnancy test in past 12 months          "

## 2018-11-12 NOTE — TELEPHONE ENCOUNTER
"Requested Prescriptions   Pending Prescriptions Disp Refills     simvastatin (ZOCOR) 40 MG tablet [Pharmacy Med Name: SIMVASTATIN 40MG TABLETS] 90 tablet 0    Last Written Prescription Date:  12/04/2017 #90 x 1  Last filled 01/28/2018  Last office visit: 12/4/2017 REGINALD Fierro   Future Office Visit: None     Sig: TAKE 1 TABLET BY MOUTH AT BEDTIME    Statins Protocol Failed    11/12/2018  1:19 PM       Failed - LDL on file in past 12 months    Recent Labs   Lab Test  07/07/17   0657   LDL  91            Passed - No abnormal creatine kinase in past 12 months    No lab results found.            Passed - Recent (12 mo) or future (30 days) visit within the authorizing provider's specialty    Patient had office visit in the last 12 months or has a visit in the next 30 days with authorizing provider or within the authorizing provider's specialty.  See \"Patient Info\" tab in inbasket, or \"Choose Columns\" in Meds & Orders section of the refill encounter.             Passed - Patient is age 18 or older       Passed - No active pregnancy on record       Passed - No positive pregnancy test in past 12 months          "

## 2018-11-13 RX ORDER — SIMVASTATIN 40 MG
40 TABLET ORAL AT BEDTIME
Qty: 30 TABLET | Refills: 0 | Status: SHIPPED | OUTPATIENT
Start: 2018-11-13 | End: 2019-06-10

## 2018-11-14 ENCOUNTER — OFFICE VISIT (OUTPATIENT)
Dept: URGENT CARE | Facility: URGENT CARE | Age: 58
End: 2018-11-14
Payer: COMMERCIAL

## 2018-11-14 VITALS
TEMPERATURE: 97.9 F | OXYGEN SATURATION: 93 % | RESPIRATION RATE: 18 BRPM | HEART RATE: 102 BPM | DIASTOLIC BLOOD PRESSURE: 86 MMHG | SYSTOLIC BLOOD PRESSURE: 136 MMHG | WEIGHT: 193 LBS | BODY MASS INDEX: 33.13 KG/M2

## 2018-11-14 DIAGNOSIS — S29.011A INTERCOSTAL MUSCLE STRAIN, INITIAL ENCOUNTER: Primary | ICD-10-CM

## 2018-11-14 PROCEDURE — 99214 OFFICE O/P EST MOD 30 MIN: CPT | Performed by: FAMILY MEDICINE

## 2018-11-14 RX ORDER — TRAMADOL HYDROCHLORIDE 50 MG/1
50 TABLET ORAL EVERY 6 HOURS PRN
Qty: 20 TABLET | Refills: 0 | Status: SHIPPED | OUTPATIENT
Start: 2018-11-14 | End: 2018-11-21

## 2018-11-14 ASSESSMENT — ENCOUNTER SYMPTOMS
BACK PAIN: 1
CHILLS: 0
FEVER: 0
HEADACHES: 0
SORE THROAT: 0
COUGH: 0
NAUSEA: 0
VOMITING: 0
DIARRHEA: 0
SHORTNESS OF BREATH: 0
RHINORRHEA: 0

## 2018-11-14 ASSESSMENT — PAIN SCALES - GENERAL: PAINLEVEL: EXTREME PAIN (9)

## 2018-11-14 NOTE — PROGRESS NOTES
"SUBJECTIVE:   Pily Roche is a 57 year old female presenting with a chief complaint of   Chief Complaint   Patient presents with     Back Pain     started on Sunday       She is an established patient of [facility].    Back Pain    Onset of symptoms was 4 day(s) ago. Was chopping ice to remove her dock. 45 min of chopping ice at that time.    Left sided acute pain stabbing pain shooting up to her shoulder blade much different and more severe than her chronic right sided lumbar area pain 20+ years. Denies hx of left side pain however. Left lower thoracic area to left inferior to left scapula        She does have a hx of L4L5, laminectomy x 2, last surgery in 1998. Does have daily back pain prior to surgery did improve quite a lot with surgery. Has gotten progressively worse.         Location: left up back on left     Context:       The injury happened as above.      Mechanism: as above       Patient experienced delayed pain  Course of symptoms is worsening.    Severity severe \"9/10 pain\"  Current and Associated symptoms: denies saddle numbness. Has had urinary incontinence for year with a urology surgery 2007.       Denies: fecal incontinence, urinary incontinence, lower extremity numbness, lower extremity weakness and paresthesia    Aggravating Factors: coughing, sneezing, bending, changing position and twisting.   Twisting is the worse.   Therapies to improve symptoms include: gabapentin and robaxin this morning.   Past history: as above, denies recent fall or injury       Right sided rib fractures x 2 in the 1980s when riding a horse.       Robaxin and gabapentin.  \"has not used pain medicine in years\"  \"vicodin has worked in the past\"       Review of Systems   Constitutional: Negative for chills and fever.   HENT: Negative for congestion, ear pain, rhinorrhea and sore throat.    Respiratory: Negative for cough and shortness of breath.    Gastrointestinal: Negative for diarrhea, nausea and vomiting. " "  Musculoskeletal: Positive for back pain.        Noted left sided thoracic rib pain posterolateral left upward from lower thoracic area to just beneath the scapula. Noted pain with gentle lateral compression of ribs at this site.    Neurological: Negative for headaches.       Past Medical History:   Diagnosis Date     Advanced directives, counseling/discussion 2/26/2013    Patient does not have an Advance/Health Care Directive (HCD), given \"What is Advance Care Planning?\" flyer.  Maureen Iglesias February 26, 2013      Arthritis      Disorders of bursae and tendons in shoulder region, unspecified 6/8/2010     Dysfunctional uterine bleeding      History of substance abuse 2006    cocaine, completed rehab     Hyperlipidemia      Insomnia      Lyme disease 1990     Pain in shoulder 3/16/2010     Seasonal allergies      Tobacco use disorders      Type 2 diabetes, HbA1c goal < 7% (H) 7/18/2013     Family History   Problem Relation Age of Onset     Diabetes Mother      Hypertension Mother      Gynecology Mother      Arthritis Mother      Thyroid Disease Mother      Allergies Mother      Lipids Father      HEART DISEASE Father      C.A.D. Father      Hypertension Maternal Grandmother      Arthritis Maternal Grandmother      Cancer Maternal Grandmother      Cancer Maternal Grandfather      C.A.D. Maternal Grandfather      Asthma Paternal Grandmother      C.A.D. Paternal Grandmother      Cerebrovascular Disease Paternal Grandfather      Alzheimer Disease Paternal Grandfather      Arthritis Paternal Grandfather      C.A.D. Paternal Grandfather      Cardiovascular Paternal Grandfather      Circulatory Paternal Grandfather      Glaucoma No family hx of      Macular Degeneration No family hx of      Current Outpatient Prescriptions   Medication Sig Dispense Refill     albuterol (VENTOLIN HFA) 108 (90 BASE) MCG/ACT Inhaler INHALE 2 PUFFS EVERY 6 HOURS AS NEEDED FOR SHORTNESS OF BREATH OR DYSPNEA 36 g 1     aspirin 81 MG tablet " Take 1 tablet (81 mg) by mouth At Bedtime 90 tablet 3     buPROPion (WELLBUTRIN SR) 150 MG 12 hr tablet Take 1 tablet (150 mg) by mouth 2 times daily 180 tablet 1     busPIRone (BUSPAR) 15 MG tablet Take 1 tablet (15 mg) by mouth 2 times daily 180 tablet 1     diclofenac (VOLTAREN) 75 MG EC tablet TAKE 1 TABLET BY MOUTH TWICE DAILY AS NEEDED FOR MODERATE PAIN. 180 tablet 0     fluticasone (FLONASE) 50 MCG/ACT spray Spray 2 sprays into both nostrils daily 48 g 1     gabapentin (NEURONTIN) 300 MG capsule TAKE 3 CAPSULES BY MOUTH THREE TIMES DAILY 810 capsule 1     lidocaine (LIDODERM) 5 % Patch APPLY 1 PATCH ONTO SKIN. WEAR FOR 12 HOURS THEN PATCH FREE FOR 12 HOURS 90 patch 0     loratadine (CLARITIN) 10 MG tablet Take 1 tablet (10 mg) by mouth daily 90 tablet 0     metFORMIN (GLUCOPHAGE-XR) 500 MG 24 hr tablet TAKE 4 TABLET BY MOUTH DAILY.( WITH DINNER) 120 tablet 0     metFORMIN (GLUCOPHAGE-XR) 500 MG 24 hr tablet Take 4 tablets (2,000 mg) by mouth daily (with dinner) 360 tablet 1     methocarbamol (ROBAXIN) 500 MG tablet Take 2 tablets (1,000 mg) by mouth 3 times daily as needed for muscle spasms 270 tablet 1     mometasone-formoterol (DULERA) 200-5 MCG/ACT oral inhaler Inhale 2 puffs into the lungs 2 times daily 3 Inhaler 1     omeprazole (PRILOSEC) 20 MG CR capsule Take 1 capsule (20 mg) by mouth daily as needed (Needs follow-up appointment for this medication) 30 capsule 0     ONETOUCH DELICA LANCETS 33G MISC TEST DAILY AS DIRECTED 100 each 0     ONETOUCH ULTRA test strip TEST DAILY AS DIRECTED 100 strip 2     order for DME 1 glucometer per insurance formulary 1 Device 0     order for DME Diabetic test strips and lancets per insurance formulary Check blood sugar once per day 3 Month 1     oxybutynin (DITROPAN-XL) 5 MG 24 hr tablet Take 1 tablet (5 mg) by mouth daily 90 tablet 1     simvastatin (ZOCOR) 40 MG tablet Take 1 tablet (40 mg) by mouth At Bedtime (Needs follow-up appointment for this medication) 30  tablet 0     simvastatin (ZOCOR) 40 MG tablet Take 1 tablet (40 mg) by mouth At Bedtime 90 tablet 1     traZODone (DESYREL) 100 MG tablet Take 2 tablets (200 mg) by mouth At Bedtime 180 tablet 0     Social History   Substance Use Topics     Smoking status: Current Every Day Smoker     Packs/day: 1.50     Years: 30.00     Types: Cigarettes     Smokeless tobacco: Never Used      Comment: 1/2 pack to 1 ppd, has an ecig     Alcohol use 3.0 - 6.0 oz/week     5 - 10 Standard drinks or equivalent per week      Comment: 2beers every few fews       OBJECTIVE  /86 (BP Location: Left arm, Patient Position: Chair, Cuff Size: Adult Regular)  Pulse 102  Temp 97.9  F (36.6  C) (Oral)  Resp 18  Wt 193 lb (87.5 kg)  SpO2 93%  BMI 33.13 kg/m2    Physical Exam   Constitutional: She appears well-developed and well-nourished. No distress.   HENT:   Head: Normocephalic and atraumatic.   Right Ear: Tympanic membrane and external ear normal.   Left Ear: Tympanic membrane and external ear normal.   Mouth/Throat: Oropharynx is clear and moist.   Eyes: EOM are normal. Pupils are equal, round, and reactive to light.   Neck: Normal range of motion. Neck supple.   Pulmonary/Chest: Effort normal. No respiratory distress (expiration noted to be slightly prolonted with slightly diminshed breath sounds. ).   Musculoskeletal: She exhibits tenderness (left costal ribs at posterior axillary line. ).   Lymphadenopathy:     She has no cervical adenopathy.   Neurological: She is alert. No cranial nerve deficit.   Skin: Skin is warm and dry. She is not diaphoretic.   Psychiatric: She has a normal mood and affect.   Nursing note and vitals reviewed.      Labs:  No results found for this or any previous visit (from the past 24 hour(s)).    X-Ray was not done.    ASSESSMENT:    ICD-10-CM    1. Intercostal muscle strain, initial encounter S29.011A traMADol (ULTRAM) 50 MG tablet      Medical Decision Making:  Rest, avoidance of painful activity.    Trial of tylenol and pain medicine as above.     PLAN:  Smoking cessation emphasized as well as the link with tobacco and back pain.   Expect this to resolve over the course of the week.   The patient indicates understanding of these issues and agrees with the plan.   Gregg Crenshaw MD

## 2018-11-14 NOTE — MR AVS SNAPSHOT
"              After Visit Summary   2018    Pily Roche    MRN: [id number]           Patient Information     Date Of Birth          1960        Visit Information        Provider Department      2018 5:35 PM Gregg Crenshaw MD [facility]        Today's Diagnoses     Intercostal muscle strain, initial encounter    -  1       Follow-ups after your visit        Who to contact     If you have questions or need follow up information about today's clinic visit or your schedule please contact [facility] directly at [phone].  Normal or non-critical lab and imaging results will be communicated to you by ClassDojohart, letter or phone within 4 business days after the clinic has received the results. If you do not hear from us within 7 days, please contact the clinic through ClassDojohart or phone. If you have a critical or abnormal lab result, we will notify you by phone as soon as possible.  Submit refill requests through SEVENROOMS or call your pharmacy and they will forward the refill request to us. Please allow 3 business days for your refill to be completed.          Additional Information About Your Visit        MyChart Information     SEVENROOMS lets you send messages to your doctor, view your test results, renew your prescriptions, schedule appointments and more. To sign up, go to [email or web address] . Click on \"Log in\" on the left side of the screen, which will take you to the Welcome page. Then click on \"Sign up Now\" on the right side of the page.     You will be asked to enter the access code listed below, as well as some personal information. Please follow the directions to create your username and password.     Your access code is: [email or web address]  Expires: 2019  6:07 PM     Your access code will  in 90 days. If you need help or a new code, please call your [facility] or [phone].        Care EveryWhere ID     This is your Care " EveryWhere ID. This could be used by other organizations to access your [place] medical records  [id number]        Your Vitals Were     Pulse Temperature Respirations Pulse Oximetry BMI (Body Mass Index)       102 97.9  F (36.6  C) (Oral) 18 93% 33.13 kg/m2        Blood Pressure from Last 3 Encounters:   11/14/18 136/86   12/04/17 119/78   11/27/17 122/84    Weight from Last 3 Encounters:   11/14/18 193 lb (87.5 kg)   12/13/17 190 lb (86.2 kg)   12/04/17 191 lb 6.4 oz (86.8 kg)              Today, you had the following     No orders found for display         Today's Medication Changes          These changes are accurate as of 11/14/18  6:07 PM.  If you have any questions, ask your nurse or doctor.               Start taking these medicines.        Dose/Directions    traMADol 50 MG tablet   Commonly known as:  ULTRAM   Used for:  Intercostal muscle strain, initial encounter   Started by:  Gregg Crenshaw MD        Dose:  50 mg   Take 1 tablet (50 mg) by mouth every 6 hours as needed for breakthrough pain or severe pain   Quantity:  20 tablet   Refills:  0            Where to get your medicines      Some of these will need a paper prescription and others can be bought over the counter.  Ask your nurse if you have questions.     Bring a paper prescription for each of these medications     traMADol 50 MG tablet               Information about OPIOIDS     PRESCRIPTION OPIOIDS: WHAT YOU NEED TO KNOW   We gave you an opioid (narcotic) pain medicine. It is important to manage your pain, but opioids are not always the best choice. You should first try all the other options your care team gave you. Take this medicine for as short a time (and as few doses) as possible.    Some activities can increase your pain, such as bandage changes or therapy sessions. It may help to take your pain medicine 30 to 60 minutes before these activities. Reduce your stress by getting enough sleep, working on hobbies you enjoy and  practicing relaxation or meditation. Talk to your care team about ways to manage your pain beyond prescription opioids.    These medicines have risks:    DO NOT drive when on new or higher doses of pain medicine. These medicines can affect your alertness and reaction times, and you could be arrested for driving under the influence (DUI). If you need to use opioids long-term, talk to your care team about driving.    DO NOT operate heavy machinery    DO NOT do any other dangerous activities while taking these medicines.    DO NOT drink any alcohol while taking these medicines.     If the opioid prescribed includes acetaminophen, DO NOT take with any other medicines that contain acetaminophen. Read all labels carefully. Look for the word  acetaminophen  or  Tylenol.  Ask your pharmacist if you have questions or are unsure.    You can get addicted to pain medicines, especially if you have a history of addiction (chemical, alcohol or substance dependence). Talk to your care team about ways to reduce this risk.    All opioids tend to cause constipation. Drink plenty of water and eat foods that have a lot of fiber, such as fruits, vegetables, prune juice, apple juice and high-fiber cereal. Take a laxative (Miralax, milk of magnesia, Colace, Senna) if you don t move your bowels at least every other day. Other side effects include upset stomach, sleepiness, dizziness, throwing up, tolerance (needing more of the medicine to have the same effect), physical dependence and slowed breathing.    Store your pills in a secure place, locked if possible. We will not replace any lost or stolen medicine. If you don t finish your medicine, please throw away (dispose) as directed by your pharmacist. The [place] Pollution [employer] Agency has more information about safe disposal: https://www.pca.[facility].mn.us/living-green/managing-unwanted-medications         Primary Care Provider Office Phone # Fax #    Kath Fierro PA-C [phone]  [phone]       [address] [place] [address]        Equal Access to Services     KARISSA CARDENAS : Hadii aad ku hadgerardoclarke Sodayanna, waaxda lumellisaadaha, qaybta kaalmada stacie, lincoln murrellsybilkelsey wells. So [facility] [phone].    ATENCIÓN: Si habla español, tiene a street disposición servicios gratuitos de asistencia lingüística. Yfname al [phone].    We comply with applicable federal civil rights laws and [place] laws. We do not discriminate on the basis of race, color, national origin, age, disability, sex, sexual orientation, or gender identity.            Thank you!     Thank you for choosing [facility]  for your care. Our goal is always to provide you with excellent care. Hearing back from our patients is one way we can continue to improve our services. Please take a few minutes to complete the written survey that you may receive in the mail after your visit with us. Thank you!             Your Updated Medication List - Protect others around you: Learn how to safely use, store and throw away your medicines at www.disposemymeds.org.          This list is accurate as of 11/14/18  6:07 PM.  Always use your most recent med list.                   Brand Name Dispense Instructions for use Diagnosis    albuterol 108 (90 Base) MCG/ACT inhaler    VENTOLIN HFA    36 g    INHALE 2 PUFFS EVERY 6 HOURS AS NEEDED FOR SHORTNESS OF BREATH OR DYSPNEA    Chronic bronchitis with COPD (chronic obstructive pulmonary disease) (H)       aspirin 81 MG tablet     90 tablet    Take 1 tablet (81 mg) by mouth At Bedtime    Type 2 diabetes mellitus with stage 2 chronic kidney disease, without long-term current use of insulin (H)       buPROPion 150 MG 12 hr tablet    WELLBUTRIN SR    180 tablet    Take 1 tablet (150 mg) by mouth 2 times daily    Tobacco use disorder, Major depressive disorder, recurrent episode, moderate (H)       busPIRone 15 MG tablet    BUSPAR    180 tablet    Take 1 tablet (15  mg) by mouth 2 times daily    GUDELIA (generalized anxiety disorder)       diclofenac 75 MG EC tablet    VOLTAREN    180 tablet    TAKE 1 TABLET BY MOUTH TWICE DAILY AS NEEDED FOR MODERATE PAIN.    Chronic shoulder pain, unspecified laterality       fluticasone 50 MCG/ACT spray    FLONASE    48 g    Spray 2 sprays into both nostrils daily    LPRD (laryngopharyngeal reflux disease)       gabapentin 300 MG capsule    NEURONTIN    810 capsule    TAKE 3 CAPSULES BY MOUTH THREE TIMES DAILY    Chronic shoulder pain, unspecified laterality       lidocaine 5 % Patch    LIDODERM    90 patch    APPLY 1 PATCH ONTO SKIN. WEAR FOR 12 HOURS THEN PATCH FREE FOR 12 HOURS    Chronic shoulder pain, unspecified laterality       loratadine 10 MG tablet    CLARITIN    90 tablet    Take 1 tablet (10 mg) by mouth daily    LPRD (laryngopharyngeal reflux disease)       * metFORMIN 500 MG 24 hr tablet    GLUCOPHAGE-XR    360 tablet    Take 4 tablets (2,000 mg) by mouth daily (with dinner)    Type 2 diabetes mellitus with stage 2 chronic kidney disease, without long-term current use of insulin (H)       * metFORMIN 500 MG 24 hr tablet    GLUCOPHAGE-XR    120 tablet    TAKE 4 TABLET BY MOUTH DAILY.( WITH DINNER)    Type 2 diabetes mellitus with stage 2 chronic kidney disease, without long-term current use of insulin (H)       methocarbamol 500 MG tablet    ROBAXIN    270 tablet    Take 2 tablets (1,000 mg) by mouth 3 times daily as needed for muscle spasms    Chronic shoulder pain, unspecified laterality       mometasone-formoterol 200-5 MCG/ACT oral inhaler    DULERA    3 Inhaler    Inhale 2 puffs into the lungs 2 times daily    Chronic bronchitis with COPD (chronic obstructive pulmonary disease) (H)       omeprazole 20 MG CR capsule    priLOSEC    30 capsule    Take 1 capsule (20 mg) by mouth daily as needed (Needs follow-up appointment for this medication)    LPRD (laryngopharyngeal reflux disease)       ONETOUCH DELICA LANCETS 33G Misc     100  each    TEST DAILY AS DIRECTED    Type 2 diabetes mellitus without retinopathy (H)       ONETOUCH ULTRA test strip   Generic drug:  blood glucose monitoring     100 strip    TEST DAILY AS DIRECTED    Type 2 diabetes, HbA1c goal < 7% (H)       * order for DME     1 Device    1 glucometer per insurance formulary    Type 2 diabetes mellitus without retinopathy (H)       * order for DME     3 Month    Diabetic test strips and lancets per insurance formulary Check blood sugar once per day    Type 2 diabetes mellitus without retinopathy (H)       oxybutynin 5 MG 24 hr tablet    DITROPAN-XL    90 tablet    Take 1 tablet (5 mg) by mouth daily    Urinary incontinence without sensory awareness       * simvastatin 40 MG tablet    ZOCOR    90 tablet    Take 1 tablet (40 mg) by mouth At Bedtime    Hyperlipidemia LDL goal <100       * simvastatin 40 MG tablet    ZOCOR    30 tablet    Take 1 tablet (40 mg) by mouth At Bedtime (Needs follow-up appointment for this medication)    Hyperlipidemia LDL goal <100       traMADol 50 MG tablet    ULTRAM    20 tablet    Take 1 tablet (50 mg) by mouth every 6 hours as needed for breakthrough pain or severe pain    Intercostal muscle strain, initial encounter       traZODone 100 MG tablet    DESYREL    180 tablet    Take 2 tablets (200 mg) by mouth At Bedtime    Major depressive disorder, recurrent episode, moderate (H)       * Notice:  This list has 6 medication(s) that are the same as other medications prescribed for you. Read the directions carefully, and ask your doctor or other care provider to review them with you.

## 2018-11-28 ENCOUNTER — OFFICE VISIT (OUTPATIENT)
Dept: PODIATRY | Facility: CLINIC | Age: 58
End: 2018-11-28
Payer: COMMERCIAL

## 2018-11-28 VITALS
BODY MASS INDEX: 33.13 KG/M2 | DIASTOLIC BLOOD PRESSURE: 66 MMHG | SYSTOLIC BLOOD PRESSURE: 128 MMHG | HEART RATE: 100 BPM | WEIGHT: 193 LBS

## 2018-11-28 DIAGNOSIS — L60.0 INGROWING NAIL: Primary | ICD-10-CM

## 2018-11-28 PROCEDURE — 11730 AVULSION NAIL PLATE SIMPLE 1: CPT | Mod: T5 | Performed by: PODIATRIST

## 2018-11-28 PROCEDURE — 99213 OFFICE O/P EST LOW 20 MIN: CPT | Mod: 25 | Performed by: PODIATRIST

## 2018-11-28 NOTE — MR AVS SNAPSHOT
After Visit Summary   11/28/2018    Pily Roche    MRN: [id number]           Patient Information     Date Of Birth          1960        Visit Information        Provider Department      11/28/2018 7:15 AM Cain Epstein DPM [facility]        Care Instructions    SMOKING CESSATION  What's in cigarette smoke? - Cigarette smoke contains over 4,000 chemicals. Nicotine is one of the main ingredients which is an insecticide/herbicide. It is poisonous to our nervous system, increases blood clotting risk, and decreases the body's defenses to fight off infection. Another chemical is Carbon Monoxide is an asphyxiating gas that permanently binds to blood cells and blocks the transport of oxygen. This leads to tissue death and decreases your metabolism. Tar is a chemical that coats your lungs and trachea which impairs new oxygen coming in and carbon dioxide getting out of your body.   How does smoking impact surgery? - Smoking is particularly hazardous with regards to surgery. Surgery puts stress on the body and a smoker's body is already under strain from these chemicals. Putting the two together, especially for an elective surgery, could be a recipe for disaster. Smoking before and after surgery increases your risk of heart problems, slow wound healing, delayed bone healing, blood clots, wound infection and anesthesia complications.   What are the benefits of quitting? - In 20 minutes your blood pressure will drop back down to normal. In 8 hours the carbon monoxide (a toxic gas) levels in your blood stream will drop by half, and oxygen levels will return to normal. In 48 hours your chance of having a heart attack will have decreased. All nicotine will have left your body. Your sense of taste and smell will return to a normal level. In 72 hours your bronchial tubes will relax, and your energy levels will increase. In 2 weeks your circulation will increase, and it will continue to  improve for the next 10 weeks.    Recommendations for elective surgery - Ideally, patients should quit smoking 8 weeks before and at least 2 weeks after elective surgery in order to avoid complications. Simply cutting back on the amount of cigarettes smoked per day does not offer any benefit or decrease the risk of poor wound healing, heart problems, and infection. Smokers should also start taking Vitamin C and B for two weeks before surgery and two weeks after surgery.    Ways to Stop Smokin. Nicotine patches, lozenges, or gum  2. Support Groups  3. Medications (see below)    List of Medications:  1. Varenicline Tartrate (CHANTIX)   2. Bupropion HCL (WELLBUTRIN, ZYBAN) - note: make sure Wellbutrin is for smoking cessation and not other issues   3. Nicotine Patch (NICODERM)   4. Nicotine Inhaler (NICOTROL)   5. Nicotine Gum Nicotine Polacrilex   6. Nicotine Lozenge: Nicotine Polacrilex (COMMIT)   * [employer] offers a smoking support group as well!  Please visit: [email or web address]  If you are interested in these, ask about getting a prescription or talk to your primary care doctor about what may be the best way for you to quit.       Weight management plan: Patient was referred to their PCP to discuss a diet and exercise plan.               Follow-ups after your visit        Who to contact     If you have questions or need follow up information about today's clinic visit or your schedule please contact [facility] directly at [phone].  Normal or non-critical lab and imaging results will be communicated to you by MyChart, letter or phone within 4 business days after the clinic has received the results. If you do not hear from us within 7 days, please contact the clinic through MyChart or phone. If you have a critical or abnormal lab result, we will notify you by phone as soon as possible.  Submit refill requests through Luxim or call your pharmacy and they will  "forward the refill request to us. Please allow 3 business days for your refill to be completed.          Additional Information About Your Visit        [employer] Information     Memoir lets you send messages to your doctor, view your test results, renew your prescriptions, schedule appointments and more. To sign up, go to [email or web address] . Click on \"Log in\" on the left side of the screen, which will take you to the Welcome page. Then click on \"Sign up Now\" on the right side of the page.     You will be asked to enter the access code listed below, as well as some personal information. Please follow the directions to create your username and password.     Your access code is: [email or web address]  Expires: 2019  6:07 PM     Your access code will  in 90 days. If you need help or a new code, please call your [place] clinic or [phone].        Care EveryWhere ID     This is your Care EveryWhere ID. This could be used by other organizations to access your [place] medical records  [id number]        Your Vitals Were     Pulse BMI (Body Mass Index)                100 33.13 kg/m2           Blood Pressure from Last 3 Encounters:   18 128/66   18 136/86   17 119/78    Weight from Last 3 Encounters:   18 193 lb (87.5 kg)   18 193 lb (87.5 kg)   17 190 lb (86.2 kg)              Today, you had the following     No orders found for display       Primary Care Provider Office Phone # Fax #    Kath Fierro PA-C [phone] [address] [place] [address]        Equal Access to Services     [facility] Center[facility] : Hadii ashley Fermin, waaxda luqadaha, qaybta kaalmada stacie, lincoln wells. So [facility] [phone].    ATENCIÓN: Si habla español, tiene a street disposición servicios gratuitos de asistencia lingüística. Llame al [phone].    We comply with applicable federal civil rights laws and [place] laws. We do not " discriminate on the basis of race, color, national origin, age, disability, sex, sexual orientation, or gender identity.            Thank you!     Thank you for choosing [facility]  for your care. Our goal is always to provide you with excellent care. Hearing back from our patients is one way we can continue to improve our services. Please take a few minutes to complete the written survey that you may receive in the mail after your visit with us. Thank you!             Your Updated Medication List - Protect others around you: Learn how to safely use, store and throw away your medicines at www.disposemymeds.org.          This list is accurate as of 11/28/18  7:15 AM.  Always use your most recent med list.                   Brand Name Dispense Instructions for use Diagnosis    albuterol 108 (90 Base) MCG/ACT inhaler    VENTOLIN HFA    36 g    INHALE 2 PUFFS EVERY 6 HOURS AS NEEDED FOR SHORTNESS OF BREATH OR DYSPNEA    Chronic bronchitis with COPD (chronic obstructive pulmonary disease) (H)       aspirin 81 MG tablet    ASA    90 tablet    Take 1 tablet (81 mg) by mouth At Bedtime    Type 2 diabetes mellitus with stage 2 chronic kidney disease, without long-term current use of insulin (H)       buPROPion 150 MG 12 hr tablet    WELLBUTRIN SR    180 tablet    Take 1 tablet (150 mg) by mouth 2 times daily    Tobacco use disorder, Major depressive disorder, recurrent episode, moderate (H)       busPIRone 15 MG tablet    BUSPAR    180 tablet    Take 1 tablet (15 mg) by mouth 2 times daily    GUDELIA (generalized anxiety disorder)       diclofenac 75 MG EC tablet    VOLTAREN    180 tablet    TAKE 1 TABLET BY MOUTH TWICE DAILY AS NEEDED FOR MODERATE PAIN.    Chronic shoulder pain, unspecified laterality       fluticasone 50 MCG/ACT nasal spray    FLONASE    48 g    Spray 2 sprays into both nostrils daily    LPRD (laryngopharyngeal reflux disease)       gabapentin 300 MG capsule    NEURONTIN    810 capsule     TAKE 3 CAPSULES BY MOUTH THREE TIMES DAILY    Chronic shoulder pain, unspecified laterality       lidocaine 5 % patch    LIDODERM    90 patch    APPLY 1 PATCH ONTO SKIN. WEAR FOR 12 HOURS THEN PATCH FREE FOR 12 HOURS    Chronic shoulder pain, unspecified laterality       loratadine 10 MG tablet    CLARITIN    90 tablet    Take 1 tablet (10 mg) by mouth daily    LPRD (laryngopharyngeal reflux disease)       * metFORMIN 500 MG 24 hr tablet    GLUCOPHAGE-XR    360 tablet    Take 4 tablets (2,000 mg) by mouth daily (with dinner)    Type 2 diabetes mellitus with stage 2 chronic kidney disease, without long-term current use of insulin (H)       * metFORMIN 500 MG 24 hr tablet    GLUCOPHAGE-XR    120 tablet    TAKE 4 TABLET BY MOUTH DAILY.( WITH DINNER)    Type 2 diabetes mellitus with stage 2 chronic kidney disease, without long-term current use of insulin (H)       methocarbamol 500 MG tablet    ROBAXIN    270 tablet    Take 2 tablets (1,000 mg) by mouth 3 times daily as needed for muscle spasms    Chronic shoulder pain, unspecified laterality       mometasone-formoterol 200-5 MCG/ACT inhaler    DULERA    3 Inhaler    Inhale 2 puffs into the lungs 2 times daily    Chronic bronchitis with COPD (chronic obstructive pulmonary disease) (H)       omeprazole 20 MG DR capsule    priLOSEC    30 capsule    Take 1 capsule (20 mg) by mouth daily as needed (Needs follow-up appointment for this medication)    LPRD (laryngopharyngeal reflux disease)       ONETOUCH DELICA LANCETS 33G Misc     100 each    TEST DAILY AS DIRECTED    Type 2 diabetes mellitus without retinopathy (H)       ONETOUCH ULTRA test strip   Generic drug:  blood glucose monitoring     100 strip    TEST DAILY AS DIRECTED    Type 2 diabetes, HbA1c goal < 7% (H)       * order for DME     1 Device    1 glucometer per insurance formulary    Type 2 diabetes mellitus without retinopathy (H)       * order for DME     3 Month    Diabetic test strips and lancets per insurance  formulary Check blood sugar once per day    Type 2 diabetes mellitus without retinopathy (H)       oxybutynin ER 5 MG 24 hr tablet    DITROPAN-XL    90 tablet    Take 1 tablet (5 mg) by mouth daily    Urinary incontinence without sensory awareness       * simvastatin 40 MG tablet    ZOCOR    90 tablet    Take 1 tablet (40 mg) by mouth At Bedtime    Hyperlipidemia LDL goal <100       * simvastatin 40 MG tablet    ZOCOR    30 tablet    Take 1 tablet (40 mg) by mouth At Bedtime (Needs follow-up appointment for this medication)    Hyperlipidemia LDL goal <100       traZODone 100 MG tablet    DESYREL    180 tablet    Take 2 tablets (200 mg) by mouth At Bedtime    Major depressive disorder, recurrent episode, moderate (H)       * Notice:  This list has 6 medication(s) that are the same as other medications prescribed for you. Read the directions carefully, and ask your doctor or other care provider to review them with you.

## 2018-11-28 NOTE — PROGRESS NOTES
"Subjective:    Pt is seen today w/ the c/c of a painful ingrown right great nail lateral border.  This has been problematic for 1 month(s).  Negative history of drainage from the site. This is slowly getting worse.  Aggravated by activity and relieved by rest.  Has tried soaking which has not helped.   denies history of trauma to the area.  Denies fever and chill, denies numbness and tingling, denies erythema on dorsum of foot.  Works as [employer].  Smoker.  Diabetes, but no numbness in toes.  Temporary here 12/2017      ROS:  See above    Past Medical History:   Diagnosis Date     Advanced directives, counseling/discussion 2/26/2013    Patient does not have an Advance/Health Care Directive (HCD), given \"What is Advance Care Planning?\" flyer.  Maureen Iglesias February 26, 2013      Arthritis      Disorders of bursae and tendons in shoulder region, unspecified 6/8/2010     Dysfunctional uterine bleeding      History of substance abuse 2006    cocaine, completed rehab     Hyperlipidemia      Insomnia      Lyme disease 1990     Pain in shoulder 3/16/2010     Seasonal allergies      Tobacco use disorders      Type 2 diabetes, HbA1c goal < 7% (H) 7/18/2013       Past Surgical History:   Procedure Laterality Date     C SHOULDER SURG PROC UNLISTED  1992    X 3 (broken clavicle and rotator cuff tear with impingement     HC SACROPLASTY  1990's    Herniated L4-L5 X 2     HYSTERECTOMY, PAP NO LONGER INDICATED  2005     HYSTERECTOMY, VAGINAL  6/28/05    Ovaries in Place       Family History   Problem Relation Age of Onset     Diabetes Mother      Hypertension Mother      Gynecology Mother      Arthritis Mother      Thyroid Disease Mother      Allergies Mother      Lipids Father      Heart Disease Father      C.A.D. Father      Hypertension Maternal Grandmother      Arthritis Maternal Grandmother      Cancer Maternal Grandmother      Cancer Maternal Grandfather      C.A.D. Maternal Grandfather      Asthma Paternal Grandmother      " KIARA Paternal Grandmother      Cerebrovascular Disease Paternal Grandfather      Alzheimer Disease Paternal Grandfather      Arthritis Paternal Grandfather      TOBIN. Paternal Grandfather      Cardiovascular Paternal Grandfather      Circulatory Paternal Grandfather      Glaucoma No family hx of      Macular Degeneration No family hx of        Social History   Substance Use Topics     Smoking status: Current Every Day Smoker     Packs/day: 1.50     Years: 30.00     Types: Cigarettes     Smokeless tobacco: Never Used      Comment: 1/2 pack to 1 ppd, has an ecig     Alcohol use 3.0 - 6.0 oz/week     5 - 10 Standard drinks or equivalent per week      Comment: 2beers every few fews         Current Outpatient Prescriptions:      albuterol (VENTOLIN HFA) 108 (90 BASE) MCG/ACT Inhaler, INHALE 2 PUFFS EVERY 6 HOURS AS NEEDED FOR SHORTNESS OF BREATH OR DYSPNEA, Disp: 36 g, Rfl: 1     aspirin 81 MG tablet, Take 1 tablet (81 mg) by mouth At Bedtime, Disp: 90 tablet, Rfl: 3     buPROPion (WELLBUTRIN SR) 150 MG 12 hr tablet, Take 1 tablet (150 mg) by mouth 2 times daily, Disp: 180 tablet, Rfl: 1     busPIRone (BUSPAR) 15 MG tablet, Take 1 tablet (15 mg) by mouth 2 times daily, Disp: 180 tablet, Rfl: 1     diclofenac (VOLTAREN) 75 MG EC tablet, TAKE 1 TABLET BY MOUTH TWICE DAILY AS NEEDED FOR MODERATE PAIN., Disp: 180 tablet, Rfl: 0     fluticasone (FLONASE) 50 MCG/ACT spray, Spray 2 sprays into both nostrils daily, Disp: 48 g, Rfl: 1     gabapentin (NEURONTIN) 300 MG capsule, TAKE 3 CAPSULES BY MOUTH THREE TIMES DAILY, Disp: 810 capsule, Rfl: 1     lidocaine (LIDODERM) 5 % Patch, APPLY 1 PATCH ONTO SKIN. WEAR FOR 12 HOURS THEN PATCH FREE FOR 12 HOURS, Disp: 90 patch, Rfl: 0     loratadine (CLARITIN) 10 MG tablet, Take 1 tablet (10 mg) by mouth daily, Disp: 90 tablet, Rfl: 0     metFORMIN (GLUCOPHAGE-XR) 500 MG 24 hr tablet, TAKE 4 TABLET BY MOUTH DAILY.( WITH DINNER), Disp: 120 tablet, Rfl: 0     metFORMIN (GLUCOPHAGE-XR) 500  MG 24 hr tablet, Take 4 tablets (2,000 mg) by mouth daily (with dinner), Disp: 360 tablet, Rfl: 1     methocarbamol (ROBAXIN) 500 MG tablet, Take 2 tablets (1,000 mg) by mouth 3 times daily as needed for muscle spasms, Disp: 270 tablet, Rfl: 1     mometasone-formoterol (DULERA) 200-5 MCG/ACT oral inhaler, Inhale 2 puffs into the lungs 2 times daily, Disp: 3 Inhaler, Rfl: 1     omeprazole (PRILOSEC) 20 MG CR capsule, Take 1 capsule (20 mg) by mouth daily as needed (Needs follow-up appointment for this medication), Disp: 30 capsule, Rfl: 0     ONETOUCH DELICA LANCETS 33G MISC, TEST DAILY AS DIRECTED, Disp: 100 each, Rfl: 0     ONETOUCH ULTRA test strip, TEST DAILY AS DIRECTED, Disp: 100 strip, Rfl: 2     order for DME, 1 glucometer per insurance formulary, Disp: 1 Device, Rfl: 0     order for DME, Diabetic test strips and lancets per insurance formulary Check blood sugar once per day, Disp: 3 Month, Rfl: 1     oxybutynin (DITROPAN-XL) 5 MG 24 hr tablet, Take 1 tablet (5 mg) by mouth daily, Disp: 90 tablet, Rfl: 1     simvastatin (ZOCOR) 40 MG tablet, Take 1 tablet (40 mg) by mouth At Bedtime (Needs follow-up appointment for this medication), Disp: 30 tablet, Rfl: 0     simvastatin (ZOCOR) 40 MG tablet, Take 1 tablet (40 mg) by mouth At Bedtime, Disp: 90 tablet, Rfl: 1     traZODone (DESYREL) 100 MG tablet, Take 2 tablets (200 mg) by mouth At Bedtime, Disp: 180 tablet, Rfl: 0       Allergies   Allergen Reactions     Quetiapine Anaphylaxis     Seroquel [Quetiapine Fumarate]      Insomnia  , anhedonia       Ambien [Zolpidem Tartrate] Difficulty breathing     Reaction after taking 5 mg on Wed, Thurs, then drank Friday afternoon and developed symptoms,  Irrability.       Zolpidem Unknown       /66  Pulse 100  Wt 193 lb (87.5 kg)  BMI 33.13 kg/m2.      Constitutional/ general:  Pt is in no apparent distress, appears well-nourished.  Cooperative with history and physical exam.     Psych:  The patient answered  questions appropriately.  Normal affect.  Seems to have reasonable expectations, in terms of treatment.     Eyes:  Visual scanning/ tracking without deficit.     Ears:  Response to auditory stimuli is normal.  No  hearing aid devices.  Auricles in proper alignment.     Lymphatic:  Popliteal lymph nodes not enlarged.     Lungs:  Non labored breathing, non labored speech. No cough.  No audible wheezing. Even, quiet breathing.       Vascular:  Pedal pulses are palpable bilaterally for both the DP and PT arteries.  CFT < 3 sec.  No ankle varicosities or edema.  Pedal hair growth noted.     Neuro:  Alert and oriented x 3. Coordinated gait.  Light touch sensation is intact to the L4, L5, S1 distributions. No obvious deficits.  No evidence of neurological-based weakness, spasticity, or contracture in the lower extremities.     Derm: Normal texture and turgor.  No ecchymosis, or cyanosis.  Hair growth noted.        Musculoskeletal:     Patient is ambulatory without an assistive device or brace.  No gross deformities.  Normal arch with weightbearing.  No forefoot or rear foot deformities noted.  MS 5/5 all compartments.  Normal ROM all fore foot and rearfoot joints.  No equinus.  right great toe nail lateral border shows soft tissue impingement with localized erythema.   negative active drainage/purulence at this time.  No sinus tracts.  No nailbed masses or exostosis.  No pain with range of motion of IPJ or MTPJ.  No ascending cellulitis.    ASSESSMENT:    Onychocryptosis with paronychia right toe.    Discussed etiology and treatment options in detail w/ the pt.  The potential causes and nature of an ingrown toenail were discussed with the patient.  We reviewed the natural history/prognosis of the condition and potential risks if no treatment is provided.      Treatment options discussed included conservative management (oral antibiotics, soaking of foot, adequate width shoes)  as well as surgical management (partial or  total nail removal).  The pros and cons of both forms of treatment were reviewed.  Handout given to patient.      After thorough discussion and answering all questions, the patient elected to have nail avulsion.  Obtained consent, used 3cc of 1% lidocaine plain to block right first toe.  Sterile prep, then avulsed the affected border(s).  No evidence of deep abscess noted.  Pt tolerated procedure well.  Sterile bandage placed, gave wound care instruction.  Return to clinic prn.        Cain Epstein DPM, FACFAS

## 2018-11-28 NOTE — LETTER
"    11/28/2018         RE: Pily Roche  [address]  [facility] [address]        Dear Colleague,    Thank you for referring your patient, Pily Roche, to the [facility]. Please see a copy of my visit note below.    Subjective:    Pt is seen today w/ the c/c of a painful ingrown right great nail lateral border.  This has been problematic for 1 month(s).  Negative history of drainage from the site. This is slowly getting worse.  Aggravated by activity and relieved by rest.  Has tried soaking which has not helped.   denies history of trauma to the area.  Denies fever and chill, denies numbness and tingling, denies erythema on dorsum of foot.  Works as PCA.  Smoker.  Diabetes, but no numbness in toes.  Temporary here 12/2017      ROS:  See above    Past Medical History:   Diagnosis Date     Advanced directives, counseling/discussion 2/26/2013    Patient does not have an Advance/Health Care Directive (HCD), given \"What is Advance Care Planning?\" flyer.  Maureen Iglesias February 26, 2013      Arthritis      Disorders of bursae and tendons in shoulder region, unspecified 6/8/2010     Dysfunctional uterine bleeding      History of substance abuse 2006    cocaine, completed rehab     Hyperlipidemia      Insomnia      Lyme disease 1990     Pain in shoulder 3/16/2010     Seasonal allergies      Tobacco use disorders      Type 2 diabetes, HbA1c goal < 7% (H) 7/18/2013       Past Surgical History:   Procedure Laterality Date     C SHOULDER SURG PROC UNLISTED  1992    X 3 (broken clavicle and rotator cuff tear with impingement     HC SACROPLASTY  1990's    Herniated L4-L5 X 2     HYSTERECTOMY, PAP NO LONGER INDICATED  2005     HYSTERECTOMY, VAGINAL  6/28/05    Ovaries in Place       Family History   Problem Relation Age of Onset     Diabetes Mother      Hypertension Mother      Gynecology Mother      Arthritis Mother      Thyroid Disease Mother      Allergies Mother      Lipids Father  "     Heart Disease Father      C.A.D. Father      Hypertension Maternal Grandmother      Arthritis Maternal Grandmother      Cancer Maternal Grandmother      Cancer Maternal Grandfather      C.A.D. Maternal Grandfather      Asthma Paternal Grandmother      C.A.D. Paternal Grandmother      Cerebrovascular Disease Paternal Grandfather      Alzheimer Disease Paternal Grandfather      Arthritis Paternal Grandfather      C.A.D. Paternal Grandfather      Cardiovascular Paternal Grandfather      Circulatory Paternal Grandfather      Glaucoma No family hx of      Macular Degeneration No family hx of        Social History   Substance Use Topics     Smoking status: Current Every Day Smoker     Packs/day: 1.50     Years: 30.00     Types: Cigarettes     Smokeless tobacco: Never Used      Comment: 1/2 pack to 1 ppd, has an ecig     Alcohol use 3.0 - 6.0 oz/week     5 - 10 Standard drinks or equivalent per week      Comment: 2beers every few fews         Current Outpatient Prescriptions:      albuterol (VENTOLIN HFA) 108 (90 BASE) MCG/ACT Inhaler, INHALE 2 PUFFS EVERY 6 HOURS AS NEEDED FOR SHORTNESS OF BREATH OR DYSPNEA, Disp: 36 g, Rfl: 1     aspirin 81 MG tablet, Take 1 tablet (81 mg) by mouth At Bedtime, Disp: 90 tablet, Rfl: 3     buPROPion (WELLBUTRIN SR) 150 MG 12 hr tablet, Take 1 tablet (150 mg) by mouth 2 times daily, Disp: 180 tablet, Rfl: 1     busPIRone (BUSPAR) 15 MG tablet, Take 1 tablet (15 mg) by mouth 2 times daily, Disp: 180 tablet, Rfl: 1     diclofenac (VOLTAREN) 75 MG EC tablet, TAKE 1 TABLET BY MOUTH TWICE DAILY AS NEEDED FOR MODERATE PAIN., Disp: 180 tablet, Rfl: 0     fluticasone (FLONASE) 50 MCG/ACT spray, Spray 2 sprays into both nostrils daily, Disp: 48 g, Rfl: 1     gabapentin (NEURONTIN) 300 MG capsule, TAKE 3 CAPSULES BY MOUTH THREE TIMES DAILY, Disp: 810 capsule, Rfl: 1     lidocaine (LIDODERM) 5 % Patch, APPLY 1 PATCH ONTO SKIN. WEAR FOR 12 HOURS THEN PATCH FREE FOR 12 HOURS, Disp: 90 patch, Rfl:  0     loratadine (CLARITIN) 10 MG tablet, Take 1 tablet (10 mg) by mouth daily, Disp: 90 tablet, Rfl: 0     metFORMIN (GLUCOPHAGE-XR) 500 MG 24 hr tablet, TAKE 4 TABLET BY MOUTH DAILY.( WITH DINNER), Disp: 120 tablet, Rfl: 0     metFORMIN (GLUCOPHAGE-XR) 500 MG 24 hr tablet, Take 4 tablets (2,000 mg) by mouth daily (with dinner), Disp: 360 tablet, Rfl: 1     methocarbamol (ROBAXIN) 500 MG tablet, Take 2 tablets (1,000 mg) by mouth 3 times daily as needed for muscle spasms, Disp: 270 tablet, Rfl: 1     mometasone-formoterol (DULERA) 200-5 MCG/ACT oral inhaler, Inhale 2 puffs into the lungs 2 times daily, Disp: 3 Inhaler, Rfl: 1     omeprazole (PRILOSEC) 20 MG CR capsule, Take 1 capsule (20 mg) by mouth daily as needed (Needs follow-up appointment for this medication), Disp: 30 capsule, Rfl: 0     ONETOUCH DELICA LANCETS 33G MISC, TEST DAILY AS DIRECTED, Disp: 100 each, Rfl: 0     ONETOUCH ULTRA test strip, TEST DAILY AS DIRECTED, Disp: 100 strip, Rfl: 2     order for DME, 1 glucometer per insurance formulary, Disp: 1 Device, Rfl: 0     order for DME, Diabetic test strips and lancets per insurance formulary Check blood sugar once per day, Disp: 3 Month, Rfl: 1     oxybutynin (DITROPAN-XL) 5 MG 24 hr tablet, Take 1 tablet (5 mg) by mouth daily, Disp: 90 tablet, Rfl: 1     simvastatin (ZOCOR) 40 MG tablet, Take 1 tablet (40 mg) by mouth At Bedtime (Needs follow-up appointment for this medication), Disp: 30 tablet, Rfl: 0     simvastatin (ZOCOR) 40 MG tablet, Take 1 tablet (40 mg) by mouth At Bedtime, Disp: 90 tablet, Rfl: 1     traZODone (DESYREL) 100 MG tablet, Take 2 tablets (200 mg) by mouth At Bedtime, Disp: 180 tablet, Rfl: 0       Allergies   Allergen Reactions     Quetiapine Anaphylaxis     Seroquel [Quetiapine Fumarate]      Insomnia  , anhedonia       Ambien [Zolpidem Tartrate] Difficulty breathing     Reaction after taking 5 mg on Wed, Thurs, then drank Friday afternoon and developed symptoms,  Irrability.        Zolpidem Unknown       /66  Pulse 100  Wt 193 lb (87.5 kg)  BMI 33.13 kg/m2.      Constitutional/ general:  Pt is in no apparent distress, appears well-nourished.  Cooperative with history and physical exam.     Psych:  The patient answered questions appropriately.  Normal affect.  Seems to have reasonable expectations, in terms of treatment.     Eyes:  Visual scanning/ tracking without deficit.     Ears:  Response to auditory stimuli is normal.  No  hearing aid devices.  Auricles in proper alignment.     Lymphatic:  Popliteal lymph nodes not enlarged.     Lungs:  Non labored breathing, non labored speech. No cough.  No audible wheezing. Even, quiet breathing.       Vascular:  Pedal pulses are palpable bilaterally for both the DP and PT arteries.  CFT < 3 sec.  No ankle varicosities or edema.  Pedal hair growth noted.     Neuro:  Alert and oriented x 3. Coordinated gait.  Light touch sensation is intact to the L4, L5, S1 distributions. No obvious deficits.  No evidence of neurological-based weakness, spasticity, or contracture in the lower extremities.     Derm: Normal texture and turgor.  No ecchymosis, or cyanosis.  Hair growth noted.        Musculoskeletal:     Patient is ambulatory without an assistive device or brace.  No gross deformities.  Normal arch with weightbearing.  No forefoot or rear foot deformities noted.  MS 5/5 all compartments.  Normal ROM all fore foot and rearfoot joints.  No equinus.  right great toe nail lateral border shows soft tissue impingement with localized erythema.   negative active drainage/purulence at this time.  No sinus tracts.  No nailbed masses or exostosis.  No pain with range of motion of IPJ or MTPJ.  No ascending cellulitis.    ASSESSMENT:    Onychocryptosis with paronychia right toe.    Discussed etiology and treatment options in detail w/ the pt.  The potential causes and nature of an ingrown toenail were discussed with the patient.  We reviewed the natural  history/prognosis of the condition and potential risks if no treatment is provided.      Treatment options discussed included conservative management (oral antibiotics, soaking of foot, adequate width shoes)  as well as surgical management (partial or total nail removal).  The pros and cons of both forms of treatment were reviewed.  Handout given to patient.      After thorough discussion and answering all questions, the patient elected to have nail avulsion.  Obtained consent, used 3cc of 1% lidocaine plain to block right first toe.  Sterile prep, then avulsed the affected border(s).  No evidence of deep abscess noted.  Pt tolerated procedure well.  Sterile bandage placed, gave wound care instruction.  Return to clinic prn.        Cain Epstein DPM, FACFAS          Again, thank you for allowing me to participate in the care of your patient.        Sincerely,        Cain Epstein DPM

## 2018-12-04 DIAGNOSIS — F33.1 MAJOR DEPRESSIVE DISORDER, RECURRENT EPISODE, MODERATE (H): ICD-10-CM

## 2018-12-04 NOTE — TELEPHONE ENCOUNTER
"Requested Prescriptions   Pending Prescriptions Disp Refills     traZODone (DESYREL) 100 MG tablet [Pharmacy Med Name: TRAZODONE 100MG TABLETS] 180 tablet 0     Sig: TAKE 2 TABLETS BY MOUTH AT BEDTIME    Serotonin Modulators Passed    12/4/2018 10:13 AM       Passed - Recent (12 mo) or future (30 days) visit within the authorizing provider's specialty    Patient had office visit in the last 12 months or has a visit in the next 30 days with authorizing provider or within the authorizing provider's specialty.  See \"Patient Info\" tab in inbasket, or \"Choose Columns\" in Meds & Orders section of the refill encounter.             Passed - Patient is age 18 or older       Passed - No active pregnancy on record       Passed - No positive pregnancy test in past 12 months        Last Written Prescription Date:  7/11/18  Last Fill Quantity: 180,  # refills: 0   Last office visit: 12/4/2017 with prescribing provider:  RENÉ   Future Office Visit:      "

## 2018-12-05 ENCOUNTER — TELEPHONE (OUTPATIENT)
Dept: FAMILY MEDICINE | Facility: CLINIC | Age: 58
End: 2018-12-05

## 2018-12-05 DIAGNOSIS — E11.22 TYPE 2 DIABETES MELLITUS WITH STAGE 2 CHRONIC KIDNEY DISEASE, WITHOUT LONG-TERM CURRENT USE OF INSULIN (H): Primary | ICD-10-CM

## 2018-12-05 DIAGNOSIS — N18.2 TYPE 2 DIABETES MELLITUS WITH STAGE 2 CHRONIC KIDNEY DISEASE, WITHOUT LONG-TERM CURRENT USE OF INSULIN (H): Primary | ICD-10-CM

## 2018-12-05 DIAGNOSIS — F17.200 TOBACCO USE DISORDER: ICD-10-CM

## 2018-12-05 DIAGNOSIS — F33.1 MAJOR DEPRESSIVE DISORDER, RECURRENT EPISODE, MODERATE (H): ICD-10-CM

## 2018-12-05 RX ORDER — TRAZODONE HYDROCHLORIDE 100 MG/1
TABLET ORAL
Qty: 180 TABLET | Refills: 0 | Status: SHIPPED | OUTPATIENT
Start: 2018-12-05 | End: 2019-06-10

## 2018-12-05 RX ORDER — BUPROPION HYDROCHLORIDE 150 MG/1
150 TABLET, EXTENDED RELEASE ORAL 2 TIMES DAILY
Qty: 180 TABLET | Refills: 0 | Status: SHIPPED | OUTPATIENT
Start: 2018-12-05 | End: 2019-06-10

## 2018-12-05 NOTE — TELEPHONE ENCOUNTER
Please call patient, due for recheck for further refills, please assist patient in scheduling appt (diabetes recheck with fasting labs prior).  Zeynep refill given.       Kath Fierro PA-C

## 2018-12-05 NOTE — TELEPHONE ENCOUNTER
"Requested Prescriptions   Pending Prescriptions Disp Refills     buPROPion (WELLBUTRIN SR) 150 MG 12 hr tablet 180 tablet 1    Last Written Prescription Date:  12/4/17  Last Fill Quantity: 180,  # refills: 1   Last office visit: 12/4/2017 with prescribing provider:  francisco   Future Office Visit:     Sig: Take 1 tablet (150 mg) by mouth 2 times daily    SSRIs Protocol Failed    12/5/2018  3:21 PM       Failed - PHQ-9 score less than 5 in past 6 months    Please review last PHQ-9 score.          Passed - Medication is Bupropion    If the medication is Bupropion (Wellbutrin), and the patient is taking for smoking cessation; OK to refill.         Passed - Patient is age 18 or older       Passed - No active pregnancy on record       Passed - No positive pregnancy test in last 12 months       Passed - Recent (6 mo) or future (30 days) visit within the authorizing provider's specialty    Patient had office visit in the last 6 months or has a visit in the next 30 days with authorizing provider or within the authorizing provider's specialty.  See \"Patient Info\" tab in inbasket, or \"Choose Columns\" in Meds & Orders section of the refill encounter.              "

## 2018-12-05 NOTE — TELEPHONE ENCOUNTER
Prescription approved per [facility] Refill Protocol.  Marichuy Castillo RN

## 2018-12-05 NOTE — TELEPHONE ENCOUNTER
**This refill requires provider completion and is not appropriate for RN review per RN refill guidelines.**  PH-Q9 needs to be less than 5 to approve medication on RN Refill protocol pt's score is 12.  Francine Paz RN

## 2019-05-24 ENCOUNTER — ANCILLARY PROCEDURE (OUTPATIENT)
Dept: MAMMOGRAPHY | Facility: CLINIC | Age: 59
End: 2019-05-24
Payer: COMMERCIAL

## 2019-05-24 DIAGNOSIS — Z12.31 VISIT FOR SCREENING MAMMOGRAM: ICD-10-CM

## 2019-05-24 PROCEDURE — 77067 SCR MAMMO BI INCL CAD: CPT | Mod: TC

## 2019-05-28 ENCOUNTER — TELEPHONE (OUTPATIENT)
Dept: FAMILY MEDICINE | Facility: CLINIC | Age: 59
End: 2019-05-28

## 2019-05-29 NOTE — TELEPHONE ENCOUNTER
Please call Pily,     I wanted to check in and see if she has established care closer to home.  If so, please update PCP.  If not, she is overdue for a diabetes and med recheck, please assist in scheduling.     Kath Fierro PA-C

## 2019-06-10 ENCOUNTER — TELEPHONE (OUTPATIENT)
Dept: NURSING | Facility: CLINIC | Age: 59
End: 2019-06-10

## 2019-06-10 ENCOUNTER — OFFICE VISIT (OUTPATIENT)
Dept: FAMILY MEDICINE | Facility: CLINIC | Age: 59
End: 2019-06-10
Payer: COMMERCIAL

## 2019-06-10 ENCOUNTER — NURSE TRIAGE (OUTPATIENT)
Dept: NURSING | Facility: CLINIC | Age: 59
End: 2019-06-10

## 2019-06-10 VITALS
RESPIRATION RATE: 22 BRPM | WEIGHT: 196.8 LBS | DIASTOLIC BLOOD PRESSURE: 82 MMHG | TEMPERATURE: 98.6 F | HEART RATE: 94 BPM | OXYGEN SATURATION: 96 % | SYSTOLIC BLOOD PRESSURE: 128 MMHG | HEIGHT: 64 IN | BODY MASS INDEX: 33.6 KG/M2

## 2019-06-10 DIAGNOSIS — Z00.00 ROUTINE GENERAL MEDICAL EXAMINATION AT A HEALTH CARE FACILITY: Primary | ICD-10-CM

## 2019-06-10 DIAGNOSIS — M25.519 CHRONIC SHOULDER PAIN, UNSPECIFIED LATERALITY: ICD-10-CM

## 2019-06-10 DIAGNOSIS — F41.1 GAD (GENERALIZED ANXIETY DISORDER): ICD-10-CM

## 2019-06-10 DIAGNOSIS — E78.5 HYPERLIPIDEMIA LDL GOAL <100: ICD-10-CM

## 2019-06-10 DIAGNOSIS — K21.9 LPRD (LARYNGOPHARYNGEAL REFLUX DISEASE): ICD-10-CM

## 2019-06-10 DIAGNOSIS — G89.29 CHRONIC SHOULDER PAIN, UNSPECIFIED LATERALITY: ICD-10-CM

## 2019-06-10 DIAGNOSIS — F33.1 MAJOR DEPRESSIVE DISORDER, RECURRENT EPISODE, MODERATE (H): ICD-10-CM

## 2019-06-10 DIAGNOSIS — J44.89 CHRONIC BRONCHITIS WITH COPD (CHRONIC OBSTRUCTIVE PULMONARY DISEASE) (H): ICD-10-CM

## 2019-06-10 DIAGNOSIS — F17.200 TOBACCO USE DISORDER: ICD-10-CM

## 2019-06-10 DIAGNOSIS — N18.2 TYPE 2 DIABETES MELLITUS WITH STAGE 2 CHRONIC KIDNEY DISEASE, WITHOUT LONG-TERM CURRENT USE OF INSULIN (H): ICD-10-CM

## 2019-06-10 DIAGNOSIS — E11.22 TYPE 2 DIABETES MELLITUS WITH STAGE 2 CHRONIC KIDNEY DISEASE, WITHOUT LONG-TERM CURRENT USE OF INSULIN (H): ICD-10-CM

## 2019-06-10 LAB
ALBUMIN SERPL-MCNC: 3.6 G/DL (ref 3.4–5)
ALP SERPL-CCNC: 137 U/L (ref 40–150)
ALT SERPL W P-5'-P-CCNC: 39 U/L (ref 0–50)
ANION GAP SERPL CALCULATED.3IONS-SCNC: 4 MMOL/L (ref 3–14)
AST SERPL W P-5'-P-CCNC: 20 U/L (ref 0–45)
BILIRUB SERPL-MCNC: 0.2 MG/DL (ref 0.2–1.3)
BUN SERPL-MCNC: 16 MG/DL (ref 7–30)
CALCIUM SERPL-MCNC: 8.8 MG/DL (ref 8.5–10.1)
CHLORIDE SERPL-SCNC: 108 MMOL/L (ref 94–109)
CHOLEST SERPL-MCNC: 177 MG/DL
CO2 SERPL-SCNC: 26 MMOL/L (ref 20–32)
CREAT SERPL-MCNC: 0.71 MG/DL (ref 0.52–1.04)
CREAT UR-MCNC: 140 MG/DL
GFR SERPL CREATININE-BSD FRML MDRD: >90 ML/MIN/{1.73_M2}
GLUCOSE SERPL-MCNC: 256 MG/DL (ref 70–99)
HBA1C MFR BLD: 7.9 % (ref 0–5.6)
HDLC SERPL-MCNC: 41 MG/DL
LDLC SERPL CALC-MCNC: 109 MG/DL
MICROALBUMIN UR-MCNC: 14 MG/L
MICROALBUMIN/CREAT UR: 9.86 MG/G CR (ref 0–25)
NONHDLC SERPL-MCNC: 136 MG/DL
POTASSIUM SERPL-SCNC: 4 MMOL/L (ref 3.4–5.3)
PROT SERPL-MCNC: 7 G/DL (ref 6.8–8.8)
SODIUM SERPL-SCNC: 138 MMOL/L (ref 133–144)
TRIGL SERPL-MCNC: 137 MG/DL
TSH SERPL DL<=0.005 MIU/L-ACNC: 1.13 MU/L (ref 0.4–4)

## 2019-06-10 PROCEDURE — 99396 PREV VISIT EST AGE 40-64: CPT | Performed by: PHYSICIAN ASSISTANT

## 2019-06-10 PROCEDURE — 99207 C FOOT EXAM  NO CHARGE: CPT | Mod: 25 | Performed by: PHYSICIAN ASSISTANT

## 2019-06-10 PROCEDURE — 99214 OFFICE O/P EST MOD 30 MIN: CPT | Mod: 25 | Performed by: PHYSICIAN ASSISTANT

## 2019-06-10 PROCEDURE — 83036 HEMOGLOBIN GLYCOSYLATED A1C: CPT | Performed by: PHYSICIAN ASSISTANT

## 2019-06-10 PROCEDURE — 82043 UR ALBUMIN QUANTITATIVE: CPT | Performed by: PHYSICIAN ASSISTANT

## 2019-06-10 PROCEDURE — 80061 LIPID PANEL: CPT | Performed by: PHYSICIAN ASSISTANT

## 2019-06-10 PROCEDURE — 84443 ASSAY THYROID STIM HORMONE: CPT | Performed by: PHYSICIAN ASSISTANT

## 2019-06-10 PROCEDURE — 80053 COMPREHEN METABOLIC PANEL: CPT | Performed by: PHYSICIAN ASSISTANT

## 2019-06-10 PROCEDURE — 36415 COLL VENOUS BLD VENIPUNCTURE: CPT | Performed by: PHYSICIAN ASSISTANT

## 2019-06-10 RX ORDER — FLUTICASONE PROPIONATE 50 MCG
2 SPRAY, SUSPENSION (ML) NASAL DAILY
Qty: 48 G | Refills: 1 | Status: SHIPPED | OUTPATIENT
Start: 2019-06-10 | End: 2020-09-30

## 2019-06-10 RX ORDER — METHOCARBAMOL 500 MG/1
1000 TABLET, FILM COATED ORAL 3 TIMES DAILY PRN
Qty: 270 TABLET | Refills: 1 | Status: SHIPPED | OUTPATIENT
Start: 2019-06-10 | End: 2020-03-27

## 2019-06-10 RX ORDER — SIMVASTATIN 40 MG
40 TABLET ORAL AT BEDTIME
Qty: 90 TABLET | Refills: 1 | Status: SHIPPED | OUTPATIENT
Start: 2019-06-10 | End: 2020-01-27

## 2019-06-10 RX ORDER — ALBUTEROL SULFATE 90 UG/1
AEROSOL, METERED RESPIRATORY (INHALATION)
Qty: 36 G | Refills: 1 | Status: SHIPPED | OUTPATIENT
Start: 2019-06-10 | End: 2022-05-10

## 2019-06-10 RX ORDER — METFORMIN HCL 500 MG
2000 TABLET, EXTENDED RELEASE 24 HR ORAL
Qty: 360 TABLET | Refills: 1 | Status: SHIPPED | OUTPATIENT
Start: 2019-06-10 | End: 2020-05-11

## 2019-06-10 RX ORDER — TRAZODONE HYDROCHLORIDE 100 MG/1
TABLET ORAL
Qty: 180 TABLET | Refills: 1 | Status: SHIPPED | OUTPATIENT
Start: 2019-06-10 | End: 2019-11-18

## 2019-06-10 RX ORDER — GABAPENTIN 300 MG/1
CAPSULE ORAL
Qty: 810 CAPSULE | Refills: 1 | Status: SHIPPED | OUTPATIENT
Start: 2019-06-10 | End: 2020-05-11

## 2019-06-10 RX ORDER — DICLOFENAC SODIUM 75 MG/1
TABLET, DELAYED RELEASE ORAL
Qty: 180 TABLET | Refills: 0 | Status: SHIPPED | OUTPATIENT
Start: 2019-06-10 | End: 2019-11-18

## 2019-06-10 RX ORDER — BUPROPION HYDROCHLORIDE 150 MG/1
150 TABLET, EXTENDED RELEASE ORAL 2 TIMES DAILY
Qty: 180 TABLET | Refills: 1 | Status: SHIPPED | OUTPATIENT
Start: 2019-06-10 | End: 2020-05-11

## 2019-06-10 RX ORDER — BUSPIRONE HYDROCHLORIDE 15 MG/1
15 TABLET ORAL 2 TIMES DAILY
Qty: 180 TABLET | Refills: 1 | Status: SHIPPED | OUTPATIENT
Start: 2019-06-10 | End: 2020-04-15

## 2019-06-10 ASSESSMENT — ANXIETY QUESTIONNAIRES
1. FEELING NERVOUS, ANXIOUS, OR ON EDGE: MORE THAN HALF THE DAYS
2. NOT BEING ABLE TO STOP OR CONTROL WORRYING: SEVERAL DAYS
3. WORRYING TOO MUCH ABOUT DIFFERENT THINGS: MORE THAN HALF THE DAYS
5. BEING SO RESTLESS THAT IT IS HARD TO SIT STILL: SEVERAL DAYS
GAD7 TOTAL SCORE: 10
IF YOU CHECKED OFF ANY PROBLEMS ON THIS QUESTIONNAIRE, HOW DIFFICULT HAVE THESE PROBLEMS MADE IT FOR YOU TO DO YOUR WORK, TAKE CARE OF THINGS AT HOME, OR GET ALONG WITH OTHER PEOPLE: SOMEWHAT DIFFICULT
6. BECOMING EASILY ANNOYED OR IRRITABLE: MORE THAN HALF THE DAYS
7. FEELING AFRAID AS IF SOMETHING AWFUL MIGHT HAPPEN: SEVERAL DAYS

## 2019-06-10 ASSESSMENT — PATIENT HEALTH QUESTIONNAIRE - PHQ9
SUM OF ALL RESPONSES TO PHQ QUESTIONS 1-9: 14
5. POOR APPETITE OR OVEREATING: SEVERAL DAYS

## 2019-06-10 ASSESSMENT — MIFFLIN-ST. JEOR: SCORE: 1462.68

## 2019-06-10 NOTE — PATIENT INSTRUCTIONS
Go to our pharmacy on your way out to get the shingles vaccine          Preventive Health Recommendations  Female Ages 50 - 64    Yearly exam: See your health care provider every year in order to  o Review health changes.   o Discuss preventive care.    o Review your medicines if your doctor has prescribed any.      Get a Pap test every three years (unless you have an abnormal result and your provider advises testing more often).    If you get Pap tests with HPV test, you only need to test every 5 years, unless you have an abnormal result.     You do not need a Pap test if your uterus was removed (hysterectomy) and you have not had cancer.    You should be tested each year for STDs (sexually transmitted diseases) if you're at risk.     Have a mammogram every 1 to 2 years.    Have a colonoscopy at age 50, or have a yearly FIT test (stool test). These exams screen for colon cancer.      Have a cholesterol test every 5 years, or more often if advised.    Have a diabetes test (fasting glucose) every three years. If you are at risk for diabetes, you should have this test more often.     If you are at risk for osteoporosis (brittle bone disease), think about having a bone density scan (DEXA).    Shots: Get a flu shot each year. Get a tetanus shot every 10 years.    Nutrition:     Eat at least 5 servings of fruits and vegetables each day.    Eat whole-grain bread, whole-wheat pasta and brown rice instead of white grains and rice.    Get adequate Calcium and Vitamin D.     Lifestyle    Exercise at least 150 minutes a week (30 minutes a day, 5 days a week). This will help you codntrol your weight and prevent disease.    Limit alcohol to one drink per day.    No smoking.     Wear sunscreen to prevent skin cancer.     See your dentist every six months for an exam and cleaning.    See your eye doctor every 1 to 2 years.

## 2019-06-10 NOTE — LETTER
June 11, 2019      Pily Roche  [address]  [facility] [place] [address]            Dear Pily,     The rest of your labs look good. I suggest you follow-up in 3 months to recheck your HgA1C, kidney and electrolytes (since we are starting the lisinopril).     Please follow-up if you have any questions or concerns.       Resulted Orders   TSH with free T4 reflex   Result Value Ref Range    TSH 1.13 0.40 - 4.00 mU/L   Albumin Random Urine Quantitative with Creat Ratio   Result Value Ref Range    Creatinine Urine 140 mg/dL    Albumin Urine mg/L 14 mg/L    Albumin Urine mg/g Cr 9.86 0 - 25 mg/g Cr   Comprehensive metabolic panel   Result Value Ref Range    Sodium 138 133 - 144 mmol/L    Potassium 4.0 3.4 - 5.3 mmol/L    Chloride 108 94 - 109 mmol/L    Carbon Dioxide 26 20 - 32 mmol/L    Anion Gap 4 3 - 14 mmol/L    Glucose 256 (H) 70 - 99 mg/dL      Comment:      Fasting specimen    Urea Nitrogen 16 7 - 30 mg/dL    Creatinine 0.71 0.52 - 1.04 mg/dL    GFR Estimate >90 >60 mL/min/[1.73_m2]      Comment:      Non  GFR Calc  Starting 12/18/2018, serum creatinine based estimated GFR (eGFR) will be   calculated using the Chronic Kidney Disease Epidemiology Collaboration   (CKD-EPI) equation.      GFR Estimate If Black >90 >60 mL/min/[1.73_m2]      Comment:       GFR Calc  Starting 12/18/2018, serum creatinine based estimated GFR (eGFR) will be   calculated using the Chronic Kidney Disease Epidemiology Collaboration   (CKD-EPI) equation.      Calcium 8.8 8.5 - 10.1 mg/dL    Bilirubin Total 0.2 0.2 - 1.3 mg/dL    Albumin 3.6 3.4 - 5.0 g/dL    Protein Total 7.0 6.8 - 8.8 g/dL    Alkaline Phosphatase 137 40 - 150 U/L    ALT 39 0 - 50 U/L    AST 20 0 - 45 U/L   Hemoglobin A1c   Result Value Ref Range    Hemoglobin A1C 7.9 (H) 0 - 5.6 %      Comment:      Normal <5.7% Prediabetes 5.7-6.4%  Diabetes 6.5% or higher - adopted from ADA   consensus guidelines.     Lipid panel reflex to direct  LDL Fasting   Result Value Ref Range    Cholesterol 177 <200 mg/dL    Triglycerides 137 <150 mg/dL      Comment:      Fasting specimen    HDL Cholesterol 41 (L) >49 mg/dL    LDL Cholesterol Calculated 109 (H) <100 mg/dL      Comment:      Above desirable:  100-129 mg/dl  Borderline High:  130-159 mg/dL  High:             160-189 mg/dL  Very high:       >189 mg/dl      Non HDL Cholesterol 136 (H) <130 mg/dL      Comment:      Above Desirable:  130-159 mg/dl  Borderline high:  160-189 mg/dl  High:             190-219 mg/dl  Very high:       >219 mg/dl         If you have any questions or concerns, please call the clinic at the number listed above.       Sincerely,        Kath Fierro PA-C/ag

## 2019-06-10 NOTE — PROGRESS NOTES
SUBJECTIVE:   CC: Pily Roche is an 58 year old woman who presents for preventive health visit.     Healthy Habits:    Do you get at least three servings of calcium containing foods daily (dairy, green leafy vegetables, etc.)? 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 2 years ago    Do you see a dentist twice per year? 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  [place] ([facility] where her Dad lives).      Needs to remove bladder sling soon (in a lawsuit for this).     Seeing chiropracter for adjustments.  Hip/pelvis was off and causing low back pain.  This has improved her back pain as well as the shooting pain down her leg.     Diabetes Follow-up      How often are you checking your blood sugar? Not at all- 156 this AM but needs more supplies    What time of day are you checking your blood sugars (select all that apply)?  Before meals    Have you had any blood sugars above 200?  No    Have you had any blood sugars below 70?  No    What symptoms do you notice when your blood sugar is low?  None    What concerns do you have today about your diabetes? None and Other: needs refills     Do you have any of these symptoms? (Select all that apply)  No numbness or tingling in feet.  No redness, sores or blisters on feet.  No complaints of excessive thirst.  No reports of blurry vision.  No significant changes to weight.     Have you had a diabetic eye exam in the last 12 months? No     Ran out of metformin 6 months ago.      Still smoking 1/2 ppd, trying to quit with oils.     Diabetes Management Resources    Hyperlipidemia  "Follow-Up      Are you having any of the following symptoms? (Select all that apply)  No complaints of shortness of breath, chest pain or pressure.  No increased sweating or nausea with activity.  No left-sided neck or arm pain.  No complaints of pain in calves when walking 1-2 blocks.    Are you regularly taking any medication or supplement to lower your cholesterol?   Yes- has been out of medication for \"awhile\"    Are you having muscle aches or other side effects that you think could be caused by your cholesterol lowering medication?  No    Hypertension Follow-up      Do you check your blood pressure regularly outside of the clinic? Yes     Are you following a low salt diet? No    Are your blood pressures ever more than 140 on the top number (systolic) OR more   than 90 on the bottom number (diastolic), for example 140/90? No    BP Readings from Last 2 Encounters:   06/10/19 128/82   11/28/18 128/66     Hemoglobin A1C (%)   Date Value   12/04/2017 6.3 (H)   07/07/2017 6.3 (H)     LDL Cholesterol Calculated (mg/dL)   Date Value   07/07/2017 91   09/15/2016 63     COPD Follow-Up    Overall, how are your COPD symptoms since your last clinic visit?  No change    How much fatigue or shortness of breath do you have when you are walking?  Same as usual    How much shortness of breath do you have when you are resting?  Same as usual    How often do you cough? Often    Have you noticed any change in your sputum/phlegm?  Yes- green    Have you experienced a recent fever? Yes    Please describe how far you can walk without stopping to rest:  Less than a mile    How many flights of stairs are you able to walk up without stopping?  2    Have you had any Emergency Room Visits, Urgent Care Visits, or Hospital Admissions because of your COPD since your last office visit?  No    History   Smoking Status     Current Every Day Smoker     Packs/day: 1.50     Years: 30.00     Types: Cigarettes   Smokeless Tobacco     Never Used     " Comment: 1/2 pack to 1 ppd, has an ecig     Lab Results   Component Value Date    FEV1 2.09@ 05/21/2008         Today's PHQ-2 Score:   PHQ-2 ( 1999 Pf[employer]) 7/7/2017 6/13/2016   Q1: Little interest or pleasure in doing things 1 1   Q2: Feeling down, depressed or hopeless 1 2   PHQ-2 Score 2 3       Abuse: Current or Past(Physical, Sexual or Emotional)- No  Do you feel safe in your environment? Yes    Social History     Tobacco Use     Smoking status: Current Every Day Smoker     Packs/day: 1.50     Years: 30.00     Pack years: 45.00     Types: Cigarettes     Smokeless tobacco: Never Used     Tobacco comment: 1/2 pack to 1 ppd, has an ecig   Substance Use Topics     Alcohol use: Yes     Alcohol/week: 3.0 - 6.0 oz     Types: 5 - 10 Standard drinks or equivalent per week     Comment: 2beers every few fews     If you drink alcohol do you typically have >3 drinks per day or >7 drinks per week? No                     Reviewed orders with patient.  Reviewed health maintenance and updated orders accordingly - Yes  Labs reviewed in EPIC    Pertinent mammograms are reviewed under the imaging tab.    History of abnormal Pap smear: Status post benign hysterectomy. Health Maintenance and Surgical History updated.  Status post hysterectomy. Pap no longer indicated.     Reviewed and updated as needed this visit by clinical staff  Tobacco  Allergies  Meds  Med Hx  Surg Hx  Fam Hx  Soc Hx        Reviewed and updated as needed this visit by Provider            ROS:  CONSTITUTIONAL: NEGATIVE for fever, chills, change in weight  INTEGUMENTARY/SKIN: NEGATIVE for worrisome rashes, moles or lesions  EYES: NEGATIVE for vision changes or irritation  ENT: NEGATIVE for ear, mouth and throat problems  RESP: NEGATIVE for significant cough or SOB  BREAST: NEGATIVE for masses, tenderness or discharge  CV: NEGATIVE for chest pain, palpitations or peripheral edema  GI: NEGATIVE for nausea, abdominal pain, heartburn, or change in bowel  habits  : NEGATIVE for unusual urinary or vaginal symptoms. No vaginal bleeding.  MUSCULOSKELETAL: NEGATIVE for significant arthralgias or myalgia  NEURO: NEGATIVE for weakness, dizziness or paresthesias  PSYCHIATRIC: NEGATIVE for changes in mood or affect     OBJECTIVE:   /82   Pulse 94   Temp 98.6  F (37  C) (Tympanic)   Resp 22   Wt 89.3 kg (196 lb 12.8 oz)   SpO2 96%   Breastfeeding? No   BMI 33.78 kg/m    EXAM:  GENERAL APPEARANCE: healthy, alert and no distress  EYES: Eyes grossly normal to inspection, PERRL and conjunctivae and sclerae normal  HENT: ear canals and TM's normal, nose and mouth without ulcers or lesions, oropharynx clear and oral mucous membranes moist  NECK: no adenopathy, no asymmetry, masses, or scars and thyroid normal to palpation  RESP: lungs clear to auscultation - no rales, rhonchi or wheezes  CV: regular rate and rhythm, normal S1 S2, no S3 or S4, no murmur, click or rub, no peripheral edema and peripheral pulses strong  ABDOMEN: soft, nontender, no hepatosplenomegaly, no masses and bowel sounds normal  MS: no musculoskeletal defects are noted and gait is age appropriate without ataxia  SKIN: no suspicious lesions or rashes  NEURO: Normal strength and tone, sensory exam grossly normal, mentation intact and speech normal  PSYCH: mentation appears normal and affect normal/bright    Diagnostic Test Results:  Labs reviewed in Epic  Results for orders placed or performed in visit on 06/10/19   TSH with free T4 reflex   Result Value Ref Range    TSH 1.13 0.40 - 4.00 mU/L   Albumin Random Urine Quantitative with Creat Ratio   Result Value Ref Range    Creatinine Urine 140 mg/dL    Albumin Urine mg/L 14 mg/L    Albumin Urine mg/g Cr 9.86 0 - 25 mg/g Cr   Comprehensive metabolic panel   Result Value Ref Range    Sodium 138 133 - 144 mmol/L    Potassium 4.0 3.4 - 5.3 mmol/L    Chloride 108 94 - 109 mmol/L    Carbon Dioxide 26 20 - 32 mmol/L    Anion Gap 4 3 - 14 mmol/L    Glucose  256 (H) 70 - 99 mg/dL    Urea Nitrogen 16 7 - 30 mg/dL    Creatinine 0.71 0.52 - 1.04 mg/dL    GFR Estimate >90 >60 mL/min/[1.73_m2]    GFR Estimate If Black >90 >60 mL/min/[1.73_m2]    Calcium 8.8 8.5 - 10.1 mg/dL    Bilirubin Total 0.2 0.2 - 1.3 mg/dL    Albumin 3.6 3.4 - 5.0 g/dL    Protein Total 7.0 6.8 - 8.8 g/dL    Alkaline Phosphatase 137 40 - 150 U/L    ALT 39 0 - 50 U/L    AST 20 0 - 45 U/L   Hemoglobin A1c   Result Value Ref Range    Hemoglobin A1C 7.9 (H) 0 - 5.6 %   Lipid panel reflex to direct LDL Fasting   Result Value Ref Range    Cholesterol 177 <200 mg/dL    Triglycerides 137 <150 mg/dL    HDL Cholesterol 41 (L) >49 mg/dL    LDL Cholesterol Calculated 109 (H) <100 mg/dL    Non HDL Cholesterol 136 (H) <130 mg/dL       ASSESSMENT/PLAN:   1. Routine general medical examination at a health care facility  Discussed Shingrex vaccine, patient will inquire at pharmacy for coverage and administration.    Current on mammo.    Pap no longer indicated.       2. Type 2 diabetes mellitus with stage 2 chronic kidney disease, without long-term current use of insulin (H)  A1C not at goal, but has been off metformin for 6 months.  I suggest she restart this and we'll recheck labs in 3 months.     BP at home have been a little high and she would also benefit from ACE-I for renal protection.  Will start lisinopril today.  Recheck labs in 3 months.   - TSH with free T4 reflex  - Albumin Random Urine Quantitative with Creat Ratio  - Comprehensive metabolic panel  - Hemoglobin A1c  - Lipid panel reflex to direct LDL Fasting  - metFORMIN (GLUCOPHAGE-XR) 500 MG 24 hr tablet; Take 4 tablets (2,000 mg) by mouth daily (with dinner)  Dispense: 360 tablet; Refill: 1  - lisinopril (PRINIVIL/ZESTRIL) 10 MG tablet; Take 1 tablet (10 mg) by mouth daily  Dispense: 90 tablet; Refill: 1    3. Chronic bronchitis with COPD (chronic obstructive pulmonary disease)  Stable.  Again discussed importance of smoking cessation, she is not  interested at this time.   - albuterol (VENTOLIN HFA) 108 (90 Base) MCG/ACT inhaler; INHALE 2 PUFFS EVERY 6 HOURS AS NEEDED FOR SHORTNESS OF BREATH OR DYSPNEA  Dispense: 36 g; Refill: 1  - mometasone-formoterol (DULERA) 200-5 MCG/ACT inhaler; Inhale 2 puffs into the lungs 2 times daily  Dispense: 3 Inhaler; Refill: 1    4. Tobacco use disorder  Will restart wellbutrin, hopefully this will help curb some cravings.  She has done a great job limiting her alcohol use, next will work on smoking.   - buPROPion (WELLBUTRIN SR) 150 MG 12 hr tablet; Take 1 tablet (150 mg) by mouth 2 times daily  Dispense: 180 tablet; Refill: 1    5. Major depressive disorder, recurrent episode, moderate (H)  Restart.  May need to consider adjustment in the future if she feels her medications are not working.   - buPROPion (WELLBUTRIN SR) 150 MG 12 hr tablet; Take 1 tablet (150 mg) by mouth 2 times daily  Dispense: 180 tablet; Refill: 1  - traZODone (DESYREL) 100 MG tablet; TAKE 2 TABLETS BY MOUTH AT BEDTIME  Dispense: 180 tablet; Refill: 1    6. GUDELIA (generalized anxiety disorder)  As above.   - busPIRone (BUSPAR) 15 MG tablet; Take 1 tablet (15 mg) by mouth 2 times daily  Dispense: 180 tablet; Refill: 1    7. Chronic shoulder pain, unspecified laterality  Continue with pain meds.   - diclofenac (VOLTAREN) 75 MG EC tablet; TAKE 1 TABLET BY MOUTH TWICE DAILY AS NEEDED FOR MODERATE PAIN.  Dispense: 180 tablet; Refill: 0  - methocarbamol (ROBAXIN) 500 MG tablet; Take 2 tablets (1,000 mg) by mouth 3 times daily as needed for muscle spasms  Dispense: 270 tablet; Refill: 1  - gabapentin (NEURONTIN) 300 MG capsule; TAKE 3 CAPSULES BY MOUTH THREE TIMES DAILY  Dispense: 810 capsule; Refill: 1    8. LPRD (laryngopharyngeal reflux disease)  Continue with meds.   - fluticasone (FLONASE) 50 MCG/ACT nasal spray; Spray 2 sprays into both nostrils daily  Dispense: 48 g; Refill: 1  - omeprazole (PRILOSEC) 20 MG DR capsule; Take 1 capsule (20 mg) by mouth  "daily as needed (GERD)  Dispense: 90 capsule; Refill: 1      9. Hyperlipidemia LDL goal <100  Tolerated statin without issue  - simvastatin (ZOCOR) 40 MG tablet; Take 1 tablet (40 mg) by mouth At Bedtime  Dispense: 90 tablet; Refill: 1    COUNSELING:   Reviewed preventive health counseling, as reflected in patient instructions       Regular exercise       Healthy diet/nutrition    Estimated body mass index is 33.78 kg/m  as calculated from the following:    Height as of 12/13/17: 1.626 m (5' 4\").    Weight as of this encounter: 89.3 kg (196 lb 12.8 oz).    Weight management plan: Discussed healthy diet and exercise guidelines     reports that she has been smoking cigarettes.  She has a 45.00 pack-year smoking history. She has never used smokeless tobacco.  Tobacco Cessation Action Plan: Information offered: Patient not interested at this time    Counseling Resources:  ATP IV Guidelines  Pooled Cohorts Equation Calculator  Breast Cancer Risk Calculator  FRAX Risk Assessment  ICSI Preventive Guidelines  Dietary Guidelines for Americans, 2010  USDA's MyPlate  ASA Prophylaxis  Lung CA Screening    F/u in 3 months.     Kath Fierro PA-C  [facility]"

## 2019-06-10 NOTE — TELEPHONE ENCOUNTER
S: Calling about medication.  B: Saw Kath Fierro PA-C today.  Pily believes Tracey Fierro PA-C was going to write her a prescription for hypertension.  A: Uses [employer] at [phone]  R: Writer will send provider a message.    Eunice Melendez RN, [facility]      Reason for Disposition    Health Information question, no triage required and triager able to answer question    Protocols used: INFORMATION ONLY CALL-A-

## 2019-06-10 NOTE — TELEPHONE ENCOUNTER
S: Calling about medication.  B: Saw Kath Fierro PA-C today.  Pily believes Tracey Fierro PA-C was going to write her a prescription for hypertension.  A: Uses [employer] at [phone]  R: Writer will send provider a message.    Eunice Melendez RN, [facility]

## 2019-06-11 RX ORDER — LISINOPRIL 10 MG/1
10 TABLET ORAL DAILY
Qty: 90 TABLET | Refills: 1 | Status: SHIPPED | OUTPATIENT
Start: 2019-06-11 | End: 2020-05-11

## 2019-06-11 ASSESSMENT — ANXIETY QUESTIONNAIRES: GAD7 TOTAL SCORE: 10

## 2019-07-24 ENCOUNTER — OFFICE VISIT (OUTPATIENT)
Dept: PODIATRY | Facility: CLINIC | Age: 59
End: 2019-07-24
Payer: COMMERCIAL

## 2019-07-24 VITALS
DIASTOLIC BLOOD PRESSURE: 60 MMHG | BODY MASS INDEX: 32.27 KG/M2 | WEIGHT: 188 LBS | SYSTOLIC BLOOD PRESSURE: 128 MMHG | HEART RATE: 86 BPM

## 2019-07-24 DIAGNOSIS — L60.0 INGROWING NAIL: Primary | ICD-10-CM

## 2019-07-24 PROCEDURE — 11750 EXCISION NAIL&NAIL MATRIX: CPT | Mod: T5 | Performed by: PODIATRIST

## 2019-07-24 NOTE — LETTER
7/24/2019         RE: Pily Roche  [address]  [facility] [address]        Dear Colleague,    Thank you for referring your patient, Pily Roche, to the [facility]. Please see a copy of my visit note below.    Subjective:    Pt is seen today w/ the c/c of a painful ingrown right great nail lateral border.  This has been problematic for 1 month(s). negativehistory of drainage from the site. This is slowly getting worse.  Aggravated by activity and relieved by rest.  Has tried soaking which has not helped.   Patient has diabetes.  She has had a temporary removal done in 2018 in 2017.  She would like a permanent removal done.  She denies any erythema edema purulence or odor    ROS: See above         Allergies   Allergen Reactions     Quetiapine Anaphylaxis     Seroquel [Quetiapine Fumarate]      Insomnia  , anhedonia       Ambien [Zolpidem Tartrate] Difficulty breathing     Reaction after taking 5 mg on Wed, Thurs, then drank Friday afternoon and developed symptoms,  Irrability.       Chantix [Varenicline]      Zolpidem Unknown       Current Outpatient Medications   Medication Sig Dispense Refill     albuterol (VENTOLIN HFA) 108 (90 Base) MCG/ACT inhaler INHALE 2 PUFFS EVERY 6 HOURS AS NEEDED FOR SHORTNESS OF BREATH OR DYSPNEA 36 g 1     aspirin 81 MG tablet Take 1 tablet (81 mg) by mouth At Bedtime 90 tablet 3     buPROPion (WELLBUTRIN SR) 150 MG 12 hr tablet Take 1 tablet (150 mg) by mouth 2 times daily 180 tablet 1     busPIRone (BUSPAR) 15 MG tablet Take 1 tablet (15 mg) by mouth 2 times daily 180 tablet 1     diclofenac (VOLTAREN) 75 MG EC tablet TAKE 1 TABLET BY MOUTH TWICE DAILY AS NEEDED FOR MODERATE PAIN. 180 tablet 0     fluticasone (FLONASE) 50 MCG/ACT nasal spray Spray 2 sprays into both nostrils daily 48 g 1     gabapentin (NEURONTIN) 300 MG capsule TAKE 3 CAPSULES BY MOUTH THREE TIMES DAILY 810 capsule 1     lisinopril (PRINIVIL/ZESTRIL) 10 MG tablet Take 1  tablet (10 mg) by mouth daily 90 tablet 1     loratadine (CLARITIN) 10 MG tablet Take 1 tablet (10 mg) by mouth daily 90 tablet 0     metFORMIN (GLUCOPHAGE-XR) 500 MG 24 hr tablet Take 4 tablets (2,000 mg) by mouth daily (with dinner) 360 tablet 1     methocarbamol (ROBAXIN) 500 MG tablet Take 2 tablets (1,000 mg) by mouth 3 times daily as needed for muscle spasms 270 tablet 1     mometasone-formoterol (DULERA) 200-5 MCG/ACT inhaler Inhale 2 puffs into the lungs 2 times daily 3 Inhaler 1     omeprazole (PRILOSEC) 20 MG DR capsule Take 1 capsule (20 mg) by mouth daily as needed (GERD) 90 capsule 1     ONETOUCH DELICA LANCETS 33G MISC TEST DAILY AS DIRECTED 100 each 0     ONETOUCH ULTRA test strip TEST DAILY AS DIRECTED 100 strip 2     order for DME Diabetic test strips and lancets per insurance formulary Check blood sugar once per day 3 Month 1     order for DME 1 glucometer per insurance formulary 1 Device 0     simvastatin (ZOCOR) 40 MG tablet Take 1 tablet (40 mg) by mouth At Bedtime 90 tablet 1     traZODone (DESYREL) 100 MG tablet TAKE 2 TABLETS BY MOUTH AT BEDTIME 180 tablet 1       Patient Active Problem List   Diagnosis     Insomnia     Chronic low back pain     Elevated liver enzymes     Moderate major depression (H)     Disorder of bursae and tendons in shoulder region     Chronic bronchitis with COPD (chronic obstructive pulmonary disease) (H)     Advance care planning     Type 2 diabetes, HbA1c goal < 7% (H)     Hyperlipidemia LDL goal <100     Impingement syndrome, shoulder     Rotator cuff tear     AC separation     Tobacco use disorder     Anxiety     Chronic shoulder pain, unspecified laterality     CKD (chronic kidney disease) stage 2, GFR 60-89 ml/min     Macular degeneration     Type II diabetes mellitus with stage 2 chronic kidney disease (H)     Non morbid obesity due to excess calories     Menopausal symptoms     Chronic bilateral low back pain without sciatica     Chronic pain syndrome      "Methamphetamine use (H)     Type 2 diabetes mellitus without retinopathy (H)     H/O alcohol abuse     GUDELIA (generalized anxiety disorder)     Elevated blood pressure reading without diagnosis of hypertension     Ingrown toenail without infection       Past Medical History:   Diagnosis Date     Advanced directives, counseling/discussion 2/26/2013    Patient does not have an Advance/Health Care Directive (HCD), given \"What is Advance Care Planning?\" flyer.  Maureen Iglesias February 26, 2013      Arthritis      Disorders of bursae and tendons in shoulder region, unspecified 6/8/2010     Dysfunctional uterine bleeding      History of substance abuse 2006    cocaine, completed rehab     Hyperlipidemia      Insomnia      Lyme disease 1990     Pain in shoulder 3/16/2010     Seasonal allergies      Tobacco use disorders      Type 2 diabetes, HbA1c goal < 7% (H) 7/18/2013       Past Surgical History:   Procedure Laterality Date     C SHOULDER SURG PROC UNLISTED  1992    X 3 (broken clavicle and rotator cuff tear with impingement     HC SACROPLASTY  1990's    Herniated L4-L5 X 2     HYSTERECTOMY, PAP NO LONGER INDICATED  2005     HYSTERECTOMY, VAGINAL  6/28/05    Ovaries in Place       Family History   Problem Relation Age of Onset     Diabetes Mother      Hypertension Mother      Gynecology Mother      Arthritis Mother      Thyroid Disease Mother      Allergies Mother      Lipids Father      Heart Disease Father      C.A.D. Father      Hypertension Maternal Grandmother      Arthritis Maternal Grandmother      Cancer Maternal Grandmother      Cancer Maternal Grandfather      C.A.D. Maternal Grandfather      Asthma Paternal Grandmother      C.A.D. Paternal Grandmother      Cerebrovascular Disease Paternal Grandfather      Alzheimer Disease Paternal Grandfather      Arthritis Paternal Grandfather      C.A.D. Paternal Grandfather      Cardiovascular Paternal Grandfather      Circulatory Paternal Grandfather      Glaucoma No " family hx of      Macular Degeneration No family hx of        Social History     Tobacco Use     Smoking status: Current Every Day Smoker     Packs/day: 1.50     Years: 30.00     Pack years: 45.00     Types: Cigarettes     Smokeless tobacco: Never Used     Tobacco comment: 1/2 pack to 1 ppd, has an ecig   Substance Use Topics     Alcohol use: Yes     Alcohol/week: 3.0 - 6.0 oz     Types: 5 - 10 Standard drinks or equivalent per week     Comment: 2beers every few fews         Exam:    Vitals: /60 (BP Location: Right arm)   Pulse 86   Wt 85.3 kg (188 lb)   BMI 32.27 kg/m     BMI: Body mass index is 32.27 kg/m .  Height: Data Unavailable    Constitutional/ general:  Pt is in no apparent distress, appears well-nourished.  Cooperative with history and physical exam.     Psych:  The patient answered questions appropriately.  Normal affect.  Seems to have reasonable expectations, in terms of treatment.     Eyes:  Visual scanning/ tracking without deficit.     Ears:  Response to auditory stimuli is normal.  negative hearing aid devices.  Auricles in proper alignment.     Lymphatic:  Popliteal lymph nodes not enlarged.     Lungs:  Non labored breathing, non labored speech. No cough.  No audible wheezing. Even, quiet breathing.       Vascular:  positive pedal pulses bilaterally for both the DP and PT arteries.  CFT < 3 sec.  negative ankle edema.  positive pedal hair growth.    Neuro:  Alert and oriented x 3. Coordinated gait.  Light touch sensation is intact to the L4, L5, S1 distributions. No obvious deficits.  No evidence of neurological-based weakness, spasticity, or contracture in the lower extremities.     Derm: Normal texture and turgor.  No erythema, ecchymosis, or cyanosis.      Musculoskeletal:    Lower extremity muscle strength is normal.  Patient is ambulatory without an assistive device or brace.  Normal arch with weightbearing.  No forefoot or rear foot deformities noted.   Normal ROM all fore foot and  rearfoot joints.  No equinus.  right great toe nail lateral border shows soft tissue impingement with localized erythema.   negative active drainage/purulence at this time.  No sinus tracts.  No nailbed masses or exostosis.  No pain with range of motion of IPJ or MTPJ.  No ascending cellulitis.    ASSESSMENT:    Onychocryptosis with paronychia right great lateral border.    Discussed etiology and treatment options in detail w/ the pt.  The potential causes and nature of an ingrown toenail were discussed with the patient.  We reviewed the natural history/prognosis of the condition and potential risks if no treatment is provided.      Treatment options discussed included conservative management (oral antibiotics, soaking of foot, adequate width shoes)  as well as surgical management (partial or total nail removal).  The pros and cons of both forms of treatment were reviewed.  Handout given to patient.      After thorough discussion and answering all questions, the patient elected to have permanent removal of affected nail border.  Risk complications and efficacy discussed with patient.  Obtained consent, used 3cc of 1% lidocaine plain to block right great toe.  Sterile prep, then avulsed the affected border.  No evidence of deep abscess noted.  Phenol was applied times 3 at 3o second intervals with curettage in between and then alcohol rinse.  Pt tolerated procedure well.  Sterile bandage placed, gave wound care instruction.  Return to clinic prn    Cain Epstein DPM, FACFAS      Again, thank you for allowing me to participate in the care of your patient.        Sincerely,        Cain Epstein DPM

## 2019-07-24 NOTE — PROGRESS NOTES
Subjective:    Pt is seen today w/ the c/c of a painful ingrown right great nail lateral border.  This has been problematic for 1 month(s). negativehistory of drainage from the site. This is slowly getting worse.  Aggravated by activity and relieved by rest.  Has tried soaking which has not helped.   Patient has diabetes.  She has had a temporary removal done in 2018 in 2017.  She would like a permanent removal done.  She denies any erythema edema purulence or odor    ROS: See above         Allergies   Allergen Reactions     Quetiapine Anaphylaxis     Seroquel [Quetiapine Fumarate]      Insomnia  , anhedonia       Ambien [Zolpidem Tartrate] Difficulty breathing     Reaction after taking 5 mg on Wed, Thurs, then drank Friday afternoon and developed symptoms,  Irrability.       Chantix [Varenicline]      Zolpidem Unknown       Current Outpatient Medications   Medication Sig Dispense Refill     albuterol (VENTOLIN HFA) 108 (90 Base) MCG/ACT inhaler INHALE 2 PUFFS EVERY 6 HOURS AS NEEDED FOR SHORTNESS OF BREATH OR DYSPNEA 36 g 1     aspirin 81 MG tablet Take 1 tablet (81 mg) by mouth At Bedtime 90 tablet 3     buPROPion (WELLBUTRIN SR) 150 MG 12 hr tablet Take 1 tablet (150 mg) by mouth 2 times daily 180 tablet 1     busPIRone (BUSPAR) 15 MG tablet Take 1 tablet (15 mg) by mouth 2 times daily 180 tablet 1     diclofenac (VOLTAREN) 75 MG EC tablet TAKE 1 TABLET BY MOUTH TWICE DAILY AS NEEDED FOR MODERATE PAIN. 180 tablet 0     fluticasone (FLONASE) 50 MCG/ACT nasal spray Spray 2 sprays into both nostrils daily 48 g 1     gabapentin (NEURONTIN) 300 MG capsule TAKE 3 CAPSULES BY MOUTH THREE TIMES DAILY 810 capsule 1     lisinopril (PRINIVIL/ZESTRIL) 10 MG tablet Take 1 tablet (10 mg) by mouth daily 90 tablet 1     loratadine (CLARITIN) 10 MG tablet Take 1 tablet (10 mg) by mouth daily 90 tablet 0     metFORMIN (GLUCOPHAGE-XR) 500 MG 24 hr tablet Take 4 tablets (2,000 mg) by mouth daily (with dinner) 360 tablet 1      methocarbamol (ROBAXIN) 500 MG tablet Take 2 tablets (1,000 mg) by mouth 3 times daily as needed for muscle spasms 270 tablet 1     mometasone-formoterol (DULERA) 200-5 MCG/ACT inhaler Inhale 2 puffs into the lungs 2 times daily 3 Inhaler 1     omeprazole (PRILOSEC) 20 MG DR capsule Take 1 capsule (20 mg) by mouth daily as needed (GERD) 90 capsule 1     ONETOUCH DELICA LANCETS 33G MISC TEST DAILY AS DIRECTED 100 each 0     ONETOUCH ULTRA test strip TEST DAILY AS DIRECTED 100 strip 2     order for DME Diabetic test strips and lancets per insurance formulary Check blood sugar once per day 3 Month 1     order for DME 1 glucometer per insurance formulary 1 Device 0     simvastatin (ZOCOR) 40 MG tablet Take 1 tablet (40 mg) by mouth At Bedtime 90 tablet 1     traZODone (DESYREL) 100 MG tablet TAKE 2 TABLETS BY MOUTH AT BEDTIME 180 tablet 1       Patient Active Problem List   Diagnosis     Insomnia     Chronic low back pain     Elevated liver enzymes     Moderate major depression (H)     Disorder of bursae and tendons in shoulder region     Chronic bronchitis with COPD (chronic obstructive pulmonary disease) (H)     Advance care planning     Type 2 diabetes, HbA1c goal < 7% (H)     Hyperlipidemia LDL goal <100     Impingement syndrome, shoulder     Rotator cuff tear     AC separation     Tobacco use disorder     Anxiety     Chronic shoulder pain, unspecified laterality     CKD (chronic kidney disease) stage 2, GFR 60-89 ml/min     Macular degeneration     Type II diabetes mellitus with stage 2 chronic kidney disease (H)     Non morbid obesity due to excess calories     Menopausal symptoms     Chronic bilateral low back pain without sciatica     Chronic pain syndrome     Methamphetamine use (H)     Type 2 diabetes mellitus without retinopathy (H)     H/O alcohol abuse     GUDELIA (generalized anxiety disorder)     Elevated blood pressure reading without diagnosis of hypertension     Ingrown toenail without infection       Past  "Medical History:   Diagnosis Date     Advanced directives, counseling/discussion 2/26/2013    Patient does not have an Advance/Health Care Directive (HCD), given \"What is Advance Care Planning?\" flyer.  Maureen Iglesias February 26, 2013      Arthritis      Disorders of bursae and tendons in shoulder region, unspecified 6/8/2010     Dysfunctional uterine bleeding      History of substance abuse 2006    cocaine, completed rehab     Hyperlipidemia      Insomnia      Lyme disease 1990     Pain in shoulder 3/16/2010     Seasonal allergies      Tobacco use disorders      Type 2 diabetes, HbA1c goal < 7% (H) 7/18/2013       Past Surgical History:   Procedure Laterality Date     C SHOULDER SURG PROC UNLISTED  1992    X 3 (broken clavicle and rotator cuff tear with impingement     HC SACROPLASTY  1990's    Herniated L4-L5 X 2     HYSTERECTOMY, PAP NO LONGER INDICATED  2005     HYSTERECTOMY, VAGINAL  6/28/05    Ovaries in Place       Family History   Problem Relation Age of Onset     Diabetes Mother      Hypertension Mother      Gynecology Mother      Arthritis Mother      Thyroid Disease Mother      Allergies Mother      Lipids Father      Heart Disease Father      C.A.D. Father      Hypertension Maternal Grandmother      Arthritis Maternal Grandmother      Cancer Maternal Grandmother      Cancer Maternal Grandfather      C.A.D. Maternal Grandfather      Asthma Paternal Grandmother      C.A.D. Paternal Grandmother      Cerebrovascular Disease Paternal Grandfather      Alzheimer Disease Paternal Grandfather      Arthritis Paternal Grandfather      C.A.D. Paternal Grandfather      Cardiovascular Paternal Grandfather      Circulatory Paternal Grandfather      Glaucoma No family hx of      Macular Degeneration No family hx of        Social History     Tobacco Use     Smoking status: Current Every Day Smoker     Packs/day: 1.50     Years: 30.00     Pack years: 45.00     Types: Cigarettes     Smokeless tobacco: Never Used     " Tobacco comment: 1/2 pack to 1 ppd, has an ecig   Substance Use Topics     Alcohol use: Yes     Alcohol/week: 3.0 - 6.0 oz     Types: 5 - 10 Standard drinks or equivalent per week     Comment: 2beers every few fews         Exam:    Vitals: /60 (BP Location: Right arm)   Pulse 86   Wt 85.3 kg (188 lb)   BMI 32.27 kg/m    BMI: Body mass index is 32.27 kg/m .  Height: Data Unavailable    Constitutional/ general:  Pt is in no apparent distress, appears well-nourished.  Cooperative with history and physical exam.     Psych:  The patient answered questions appropriately.  Normal affect.  Seems to have reasonable expectations, in terms of treatment.     Eyes:  Visual scanning/ tracking without deficit.     Ears:  Response to auditory stimuli is normal.  negative hearing aid devices.  Auricles in proper alignment.     Lymphatic:  Popliteal lymph nodes not enlarged.     Lungs:  Non labored breathing, non labored speech. No cough.  No audible wheezing. Even, quiet breathing.       Vascular:  positive pedal pulses bilaterally for both the DP and PT arteries.  CFT < 3 sec.  negative ankle edema.  positive pedal hair growth.    Neuro:  Alert and oriented x 3. Coordinated gait.  Light touch sensation is intact to the L4, L5, S1 distributions. No obvious deficits.  No evidence of neurological-based weakness, spasticity, or contracture in the lower extremities.     Derm: Normal texture and turgor.  No erythema, ecchymosis, or cyanosis.      Musculoskeletal:    Lower extremity muscle strength is normal.  Patient is ambulatory without an assistive device or brace.  Normal arch with weightbearing.  No forefoot or rear foot deformities noted.   Normal ROM all fore foot and rearfoot joints.  No equinus.  right great toe nail lateral border shows soft tissue impingement with localized erythema.   negative active drainage/purulence at this time.  No sinus tracts.  No nailbed masses or exostosis.  No pain with range of motion of  IPJ or MTPJ.  No ascending cellulitis.    ASSESSMENT:    Onychocryptosis with paronychia right great lateral border.    Discussed etiology and treatment options in detail w/ the pt.  The potential causes and nature of an ingrown toenail were discussed with the patient.  We reviewed the natural history/prognosis of the condition and potential risks if no treatment is provided.      Treatment options discussed included conservative management (oral antibiotics, soaking of foot, adequate width shoes)  as well as surgical management (partial or total nail removal).  The pros and cons of both forms of treatment were reviewed.  Handout given to patient.      After thorough discussion and answering all questions, the patient elected to have permanent removal of affected nail border.  Risk complications and efficacy discussed with patient.  Obtained consent, used 3cc of 1% lidocaine plain to block right great toe.  Sterile prep, then avulsed the affected border.  No evidence of deep abscess noted.  Phenol was applied times 3 at 3o second intervals with curettage in between and then alcohol rinse.  Pt tolerated procedure well.  Sterile bandage placed, gave wound care instruction.  Return to clinic prn    Cain Epstein DPM, FACFAS

## 2019-07-24 NOTE — PATIENT INSTRUCTIONS
Weight management plan: Patient was referred to their PCP to discuss a diet and exercise plan.  We wish you continued good healing. If you have any questions or concerns, please do not hesitate to contact us at [phone]    Please remember to call and schedule a follow up appointment if one was recommended at your earliest convenience.   PODIATRY CLINIC HOURS  TELEPHONE NUMBER    Dr. Cain Epstein D.P.M [facility]    Clinics:  [facility]    [facility]   Tuesday 1PM-6PM  [place]  Wednesday 7AM-2PM  [facility]  Thursday 10AM-6PM  [place]  Friday 7AM-3PM  [place]  Specialty schedulers:   [phone] to make an appointment with any Specialty Provider.        Urgent Care locations:    [facility] Monday-Friday 5 pm - 9 pm. Saturday-Sunday 9 am -5pm    Monday-Friday 11 am - 9 pm Saturday 9 am - 5 pm     Monday-Sunday 12 noon-8PM [phone]     If you need a medication refill, please contact us you may need lab work and/or a follow up visit prior to your refill (i.e. Antifungal medications).    [facility] (secure e-mail communication and access to your chart) to send a message or to make an appointment.    If MRI needed please call [facility] at [phone]        SMOKING CESSATION  What's in cigarette smoke? - Cigarette smoke contains over 4,000 chemicals. Nicotine is one of the main ingredients which is an insecticide/herbicide. It is poisonous to our nervous system, increases blood clotting risk, and decreases the body's defenses to fight off infection. Another chemical is Carbon Monoxide is an asphyxiating gas that permanently binds to blood cells and blocks the transport of oxygen. This leads to tissue death and decreases your metabolism. Tar is a chemical that coats your lungs and trachea which impairs new oxygen coming in and carbon dioxide getting out  of your body.   How does smoking impact surgery? - Smoking is particularly hazardous with regards to surgery. Surgery puts stress on the body and a smoker's body is already under strain from these chemicals. Putting the two together, especially for an elective surgery, could be a recipe for disaster. Smoking before and after surgery increases your risk of heart problems, slow wound healing, delayed bone healing, blood clots, wound infection and anesthesia complications.   What are the benefits of quitting? - In 20 minutes your blood pressure will drop back down to normal. In 8 hours the carbon monoxide (a toxic gas) levels in your blood stream will drop by half, and oxygen levels will return to normal. In 48 hours your chance of having a heart attack will have decreased. All nicotine will have left your body. Your sense of taste and smell will return to a normal level. In 72 hours your bronchial tubes will relax, and your energy levels will increase. In 2 weeks your circulation will increase, and it will continue to improve for the next 10 weeks.    Recommendations for elective surgery - Ideally, patients should quit smoking 8 weeks before and at least 2 weeks after elective surgery in order to avoid complications. Simply cutting back on the amount of cigarettes smoked per day does not offer any benefit or decrease the risk of poor wound healing, heart problems, and infection. Smokers should also start taking Vitamin C and B for two weeks before surgery and two weeks after surgery.    Ways to Stop Smokin. Nicotine patches, lozenges, or gum  2. Support Groups  3. Medications (see below)    List of Medications:  1. Varenicline Tartrate (CHANTIX)   2. Bupropion HCL (WELLBUTRIN, ZYBAN) - note: make sure Wellbutrin is for smoking cessation and not other issues   3. Nicotine Patch (NICODERM)   4. Nicotine Inhaler (NICOTROL)   5. Nicotine Gum Nicotine Polacrilex   6. Nicotine Lozenge: Nicotine Polacrilex (COMMIT)    * [employer] offers a smoking support group as well!  Please visit: [email or web address]  If you are interested in these, ask about getting a prescription or talk to your primary care doctor about what may be the best way for you to quit.

## 2019-08-16 ENCOUNTER — TELEPHONE (OUTPATIENT)
Dept: FAMILY MEDICINE | Facility: CLINIC | Age: 59
End: 2019-08-16

## 2019-08-16 NOTE — TELEPHONE ENCOUNTER
Received a Prior Authorization (PA) request from [facility] for OneTouch Ultra Blue Test Strips    Routed to the [employer] epa (electronic prior authorization) team        Severiano DIALLO (malaika)  [facility]

## 2019-08-19 ENCOUNTER — OFFICE VISIT (OUTPATIENT)
Dept: FAMILY MEDICINE | Facility: CLINIC | Age: 59
End: 2019-08-19
Payer: COMMERCIAL

## 2019-08-19 VITALS
WEIGHT: 190.6 LBS | HEIGHT: 64 IN | DIASTOLIC BLOOD PRESSURE: 82 MMHG | OXYGEN SATURATION: 96 % | HEART RATE: 108 BPM | TEMPERATURE: 98.3 F | BODY MASS INDEX: 32.54 KG/M2 | RESPIRATION RATE: 20 BRPM | SYSTOLIC BLOOD PRESSURE: 124 MMHG

## 2019-08-19 DIAGNOSIS — E11.22 TYPE 2 DIABETES MELLITUS WITH STAGE 2 CHRONIC KIDNEY DISEASE, WITHOUT LONG-TERM CURRENT USE OF INSULIN (H): ICD-10-CM

## 2019-08-19 DIAGNOSIS — M54.16 LUMBAR RADICULOPATHY: Primary | ICD-10-CM

## 2019-08-19 DIAGNOSIS — N18.2 TYPE 2 DIABETES MELLITUS WITH STAGE 2 CHRONIC KIDNEY DISEASE, WITHOUT LONG-TERM CURRENT USE OF INSULIN (H): ICD-10-CM

## 2019-08-19 PROCEDURE — 99214 OFFICE O/P EST MOD 30 MIN: CPT | Performed by: PHYSICIAN ASSISTANT

## 2019-08-19 RX ORDER — TRAMADOL HYDROCHLORIDE 50 MG/1
50 TABLET ORAL EVERY 6 HOURS PRN
Qty: 30 TABLET | Refills: 0 | Status: SHIPPED | OUTPATIENT
Start: 2019-08-19 | End: 2019-08-22

## 2019-08-19 RX ORDER — GLUCOSAMINE HCL/CHONDROITIN SU 500-400 MG
CAPSULE ORAL
Qty: 100 EACH | Refills: 3 | Status: SHIPPED | OUTPATIENT
Start: 2019-08-19

## 2019-08-19 RX ORDER — OXYBUTYNIN CHLORIDE 5 MG/1
TABLET, EXTENDED RELEASE ORAL
Refills: 0 | COMMUNITY
Start: 2018-10-17 | End: 2020-05-11

## 2019-08-19 ASSESSMENT — MIFFLIN-ST. JEOR: SCORE: 1434.56

## 2019-08-19 NOTE — PATIENT INSTRUCTIONS
Call [facility] [phone] to set up the MRI and then f/u with spine specialist (they will call you to set this up)

## 2019-08-19 NOTE — PROGRESS NOTES
Subjective     Pily Roche is a 58 year old female who presents to clinic today for the following health issues:  *Needs [employer] guide monitor and supplies  HPI   Chronic/Recurring Left Hip/ Back Pain Follow Up      Where is your back pain located? (Select all that apply) low back left and hip left    How would you describe your back pain?  gnawing, sharp and shooting and numbness    Where does your back pain spread? the left buttock    Since your last clinic visit for back pain, how has your pain changed? gradually worsening    Does your back pain interfere with your job? YES    Since your last visit, have you tried any new treatment? Yes -  chiropractor 2x per week    Dania Bellamy CMA      Back pain started 3 months ago.   No injury or trauma noted.  Had similar symptoms in the past however on the right.  She did have surgery on her spine back in the late 1990s.      Seeing chiropracter which is helpful short term, needs a referral for this to be covered.  Right hip no issues.      Pain is in left gluteal area and radiates down to the outside of left lower leg.    Left toes with numbness that comes and goes (worse if walking around).  Laying down the numbness will go away, however then the low back and hip hurts.      Has used a dose of tramadol and this helped recently.  She does take gabapentin.         Patient Active Problem List   Diagnosis     Insomnia     Chronic low back pain     Elevated liver enzymes     Moderate major depression (H)     Disorder of bursae and tendons in shoulder region     Chronic bronchitis with COPD (chronic obstructive pulmonary disease) (H)     Advance care planning     Type 2 diabetes, HbA1c goal < 7% (H)     Hyperlipidemia LDL goal <100     Impingement syndrome, shoulder     Rotator cuff tear     AC separation     Tobacco use disorder     Anxiety     Chronic shoulder pain, unspecified laterality     CKD (chronic kidney disease) stage 2, GFR 60-89 ml/min     Macular  degeneration     Type II diabetes mellitus with stage 2 chronic kidney disease (H)     Non morbid obesity due to excess calories     Menopausal symptoms     Chronic bilateral low back pain without sciatica     Chronic pain syndrome     Methamphetamine use (H)     Type 2 diabetes mellitus without retinopathy (H)     H/O alcohol abuse     GUDELIA (generalized anxiety disorder)     Elevated blood pressure reading without diagnosis of hypertension     Ingrown toenail without infection     Past Surgical History:   Procedure Laterality Date     C SHOULDER SURG PROC UNLISTED  1992    X 3 (broken clavicle and rotator cuff tear with impingement     HC SACROPLASTY  1990's    Herniated L4-L5 X 2     HYSTERECTOMY, PAP NO LONGER INDICATED  2005     HYSTERECTOMY, VAGINAL  6/28/05    Ovaries in Place       Social History     Tobacco Use     Smoking status: Current Every Day Smoker     Packs/day: 1.50     Years: 30.00     Pack years: 45.00     Types: Cigarettes     Smokeless tobacco: Never Used     Tobacco comment: 1/2 pack to 1 ppd, has an ecig   Substance Use Topics     Alcohol use: Yes     Alcohol/week: 3.0 - 6.0 oz     Types: 5 - 10 Standard drinks or equivalent per week     Comment: 2beers every few fews     Family History   Problem Relation Age of Onset     Diabetes Mother      Hypertension Mother      Gynecology Mother      Arthritis Mother      Thyroid Disease Mother      Allergies Mother      Lipids Father      Heart Disease Father      C.A.D. Father      Hypertension Maternal Grandmother      Arthritis Maternal Grandmother      Cancer Maternal Grandmother      Cancer Maternal Grandfather      C.A.D. Maternal Grandfather      Asthma Paternal Grandmother      C.A.D. Paternal Grandmother      Cerebrovascular Disease Paternal Grandfather      Alzheimer Disease Paternal Grandfather      Arthritis Paternal Grandfather      C.A.D. Paternal Grandfather      Cardiovascular Paternal Grandfather      Circulatory Paternal Grandfather       Glaucoma No family hx of      Macular Degeneration No family hx of          Current Outpatient Medications   Medication Sig Dispense Refill     albuterol (VENTOLIN HFA) 108 (90 Base) MCG/ACT inhaler INHALE 2 PUFFS EVERY 6 HOURS AS NEEDED FOR SHORTNESS OF BREATH OR DYSPNEA 36 g 1     alcohol swab prep pads Use to swab area of injection/mike as directed 100 each 3     aspirin 81 MG tablet Take 1 tablet (81 mg) by mouth At Bedtime 90 tablet 3     blood glucose (NO BRAND SPECIFIED) test strip Use to test blood sugar one time daily or as directed. To accompany: Blood Glucose Monitor Brands: per insurance. 100 strip 6     blood glucose monitoring (NO BRAND SPECIFIED) meter device kit Use to test blood sugar one time daily or as directed. 1 kit 0     buPROPion (WELLBUTRIN SR) 150 MG 12 hr tablet Take 1 tablet (150 mg) by mouth 2 times daily 180 tablet 1     busPIRone (BUSPAR) 15 MG tablet Take 1 tablet (15 mg) by mouth 2 times daily 180 tablet 1     diclofenac (VOLTAREN) 75 MG EC tablet TAKE 1 TABLET BY MOUTH TWICE DAILY AS NEEDED FOR MODERATE PAIN. 180 tablet 0     fluticasone (FLONASE) 50 MCG/ACT nasal spray Spray 2 sprays into both nostrils daily 48 g 1     gabapentin (NEURONTIN) 300 MG capsule TAKE 3 CAPSULES BY MOUTH THREE TIMES DAILY 810 capsule 1     lisinopril (PRINIVIL/ZESTRIL) 10 MG tablet Take 1 tablet (10 mg) by mouth daily 90 tablet 1     loratadine (CLARITIN) 10 MG tablet Take 1 tablet (10 mg) by mouth daily 90 tablet 0     metFORMIN (GLUCOPHAGE-XR) 500 MG 24 hr tablet Take 4 tablets (2,000 mg) by mouth daily (with dinner) 360 tablet 1     methocarbamol (ROBAXIN) 500 MG tablet Take 2 tablets (1,000 mg) by mouth 3 times daily as needed for muscle spasms 270 tablet 1     mometasone-formoterol (DULERA) 200-5 MCG/ACT inhaler Inhale 2 puffs into the lungs 2 times daily 3 Inhaler 1     omeprazole (PRILOSEC) 20 MG DR capsule Take 1 capsule (20 mg) by mouth daily as needed (GERD) 90 capsule 1     ONETOUCH DELICA  "LANCETS 33G MISC TEST DAILY AS DIRECTED 100 each 0     ONETOUCH ULTRA test strip TEST DAILY AS DIRECTED 100 strip 2     order for DME Diabetic test strips and lancets per insurance formulary Check blood sugar once per day 3 Month 1     order for DME 1 glucometer per insurance formulary 1 Device 0     oxybutynin ER (DITROPAN-XL) 5 MG 24 hr tablet   0     simvastatin (ZOCOR) 40 MG tablet Take 1 tablet (40 mg) by mouth At Bedtime 90 tablet 1     traMADol (ULTRAM) 50 MG tablet Take 1 tablet (50 mg) by mouth every 6 hours as needed for severe pain 30 tablet 0     traZODone (DESYREL) 100 MG tablet TAKE 2 TABLETS BY MOUTH AT BEDTIME 180 tablet 1     BP Readings from Last 3 Encounters:   08/19/19 124/82   07/24/19 128/60   06/10/19 128/82    Wt Readings from Last 3 Encounters:   08/19/19 86.5 kg (190 lb 9.6 oz)   07/24/19 85.3 kg (188 lb)   06/10/19 89.3 kg (196 lb 12.8 oz)                      Reviewed and updated as needed this visit by Provider         Review of Systems   ROS COMP: Constitutional, HEENT, cardiovascular, pulmonary, GI, , musculoskeletal, neuro, skin, endocrine and psych systems are negative, except as otherwise noted.      Objective    /82   Pulse 108   Temp 98.3  F (36.8  C) (Tympanic)   Resp 20   Ht 1.626 m (5' 4\")   Wt 86.5 kg (190 lb 9.6 oz)   SpO2 96%   Breastfeeding? No   BMI 32.72 kg/m    Body mass index is 32.72 kg/m .  Physical Exam   GENERAL: healthy, alert and no distress    Patient appears to be in mild to moderate pain, antalgic gait noted. Lumbosacral spine area reveals normal spinal curvature and no local tenderness or mass.  Painful and reduced LS ROM noted. Supine straight leg raise is negative at 90 degrees on both sides. DTR's, motor strength and sensation normal, including heel and toe gait.  Peripheral pulses are palpable.  Foot examination performed.  No lesions or skin breakdown noted.   Dorsalis pedis and posterior tibialis pulses intact bilaterally.  Sensation to " "monofilament test normal bilaterally.               Diagnostic Test Results:  none         Assessment & Plan     1. Lumbar radiculopathy  Continue with chiropracter, will check an MRI (no contrast ordered as surgery on this area was 10+ years ago).  F/u with medical spine specialist after MRI.  Continue with gabapentin.  Steroid course considered, however this is not acute at this point and sugars have been high.  Use tramadol PRN, do not mix with other sedating meds (will spread out from trazodone)  - traMADol (ULTRAM) 50 MG tablet; Take 1 tablet (50 mg) by mouth every 6 hours as needed for severe pain  Dispense: 30 tablet; Refill: 0  - CHIROPRACTIC REFERRAL  - MR Lumbar Spine w/o Contrast; Future  - ORTHO  REFERRAL    2. Type 2 diabetes mellitus with stage 2 chronic kidney disease, without long-term current use of insulin (H)  Needs a new meter and supplies will f/u for a diabetes recheck within the next month.   - blood glucose monitoring (NO BRAND SPECIFIED) meter device kit; Use to test blood sugar one time daily or as directed.  Dispense: 1 kit; Refill: 0  - blood glucose (NO BRAND SPECIFIED) test strip; Use to test blood sugar one time daily or as directed. To accompany: Blood Glucose Monitor Brands: per insurance.  Dispense: 100 strip; Refill: 6  - alcohol swab prep pads; Use to swab area of injection/mike as directed  Dispense: 100 each; Refill: 3     Tobacco Cessation:   reports that she has been smoking cigarettes.  She has a 45.00 pack-year smoking history. She has never used smokeless tobacco.  Tobacco Cessation Action Plan: Information offered: Patient not interested at this time      BMI:   Estimated body mass index is 32.72 kg/m  as calculated from the following:    Height as of this encounter: 1.626 m (5' 4\").    Weight as of this encounter: 86.5 kg (190 lb 9.6 oz).   Weight management plan: Discussed healthy diet and exercise guidelines            Return in about 4 weeks (around " 9/16/2019) for Diabetes recheck (non-fasting labs same day).    Kath Fierro PA-C  [facility]

## 2019-08-21 NOTE — TELEPHONE ENCOUNTER
CENTRAL PRIOR AUTHORIZATION  [phone]    PA Initiation    Medication: OneTouch test strips  Insurance Company: [employer] - Phone [phone] Fax [phone]  Pharmacy Filling the Rx: [id number] [address] [facility] [facility] [address] [facility]  Filling Pharmacy Phone: [phone]  Filling Pharmacy Fax:    Start Date: 8/21/2019

## 2019-08-23 NOTE — TELEPHONE ENCOUNTER
The pharmacy did change the product to the covered Cont[employer] Next and they are ready to be picked up.

## 2019-08-23 NOTE — TELEPHONE ENCOUNTER
Prior Authorization Approval    Authorization Effective Date: 7/22/2019  Authorization Expiration Date: 8/20/2020  Medication: OneTouch test strips ([employer]) Approved  Approved Dose/Quantity:   Reference #: [id number]   Insurance Company: [employer] - Phone [phone] Fax [phone]  Expected CoPay:       CoPay Card Available: No    Foundation Assistance Needed:    Which Pharmacy is filling the prescription (Not needed for infusion/clinic administered): [id number] - [facility], [place] - [address] [facility] [address] [facility]  Pharmacy Notified: Yes  Patient Notified: Yes

## 2019-08-28 ENCOUNTER — ANCILLARY PROCEDURE (OUTPATIENT)
Dept: MRI IMAGING | Facility: CLINIC | Age: 59
End: 2019-08-28
Attending: PHYSICIAN ASSISTANT
Payer: COMMERCIAL

## 2019-08-28 DIAGNOSIS — M54.16 LUMBAR RADICULOPATHY: ICD-10-CM

## 2019-08-28 PROCEDURE — 72148 MRI LUMBAR SPINE W/O DYE: CPT | Performed by: RADIOLOGY

## 2019-09-04 ENCOUNTER — TELEPHONE (OUTPATIENT)
Dept: FAMILY MEDICINE | Facility: CLINIC | Age: 59
End: 2019-09-04

## 2019-09-04 NOTE — TELEPHONE ENCOUNTER
[facility]    Thank you,    Nan Armendariz, [place]

## 2019-09-05 ENCOUNTER — OFFICE VISIT (OUTPATIENT)
Dept: NEUROSURGERY | Facility: CLINIC | Age: 59
End: 2019-09-05
Payer: COMMERCIAL

## 2019-09-05 VITALS
TEMPERATURE: 98 F | BODY MASS INDEX: 31.72 KG/M2 | WEIGHT: 190.4 LBS | HEIGHT: 65 IN | HEART RATE: 107 BPM | RESPIRATION RATE: 18 BRPM | DIASTOLIC BLOOD PRESSURE: 86 MMHG | OXYGEN SATURATION: 93 % | SYSTOLIC BLOOD PRESSURE: 127 MMHG

## 2019-09-05 DIAGNOSIS — M47.816 LUMBAR FACET ARTHROPATHY: Primary | ICD-10-CM

## 2019-09-05 PROCEDURE — 99204 OFFICE O/P NEW MOD 45 MIN: CPT | Performed by: NEUROLOGICAL SURGERY

## 2019-09-05 ASSESSMENT — PAIN SCALES - GENERAL: PAINLEVEL: SEVERE PAIN (7)

## 2019-09-05 ASSESSMENT — MIFFLIN-ST. JEOR: SCORE: 1441.59

## 2019-09-05 NOTE — PATIENT INSTRUCTIONS
1. Order placed with Alfonso ([facility]) for Medial branch block with possible RFA, they will call you to schedule. [phone]      Please call our clinic with any questions or concerns: [phone]

## 2019-09-05 NOTE — NURSING NOTE
"Pily Roche is a 58 year old female who presents for:  Chief Complaint   Patient presents with     Back Pain        Initial Vitals:  /86   Pulse 107   Temp 98  F (36.7  C) (Oral)   Resp 18   Ht 5' 4.5\" (1.638 m)   Wt 190 lb 6.4 oz (86.4 kg)   SpO2 93%   BMI 32.18 kg/m   Estimated body mass index is 32.18 kg/m  as calculated from the following:    Height as of this encounter: 5' 4.5\" (1.638 m).    Weight as of this encounter: 190 lb 6.4 oz (86.4 kg).. Body surface area is 1.98 meters squared. BP completed using cuff size: large  Severe Pain (7)        Nursing Comments: back pain         Paty Ye RN    "

## 2019-09-05 NOTE — LETTER
"    9/5/2019         RE: Pily Roche  [address]  [facility] [address]        Dear Colleague,    Thank you for referring your patient, Pily Roche, to the [facility]. Please see a copy of my visit note below.    I was asked by Dr. Fierro to see this patient in consultation    58F w/ hx right L5-S1 microdiscectomy, p/w back pain, left leg pain, L4-5 facet inflammation.    3 months of predominantly left low back pain, with radiation to the buttocks, worse at work.  Also with sharp radiation to the left posterior thigh and calf.  Has been doing chiropractic care without improvement.  MR with right L5-S1 hemilaminectomy, acute L4-5 facet inflammation, moderate stenosis and lateral recess stenosis.       Past Medical History:   Diagnosis Date     Advanced directives, counseling/discussion 2/26/2013    Patient does not have an Advance/Health Care Directive (HCD), given \"What is Advance Care Planning?\" flyer.  Maureen Iglesias February 26, 2013      Arthritis      Disorders of bursae and tendons in shoulder region, unspecified 6/8/2010     Dysfunctional uterine bleeding      History of substance abuse 2006    cocaine, completed rehab     Hyperlipidemia      Insomnia      Lyme disease 1990     Pain in shoulder 3/16/2010     Seasonal allergies      Tobacco use disorders      Type 2 diabetes, HbA1c goal < 7% (H) 7/18/2013     Past Surgical History:   Procedure Laterality Date     C SHOULDER SURG PROC UNLISTED  1992    X 3 (broken clavicle and rotator cuff tear with impingement     HC SACROPLASTY  1990's    Herniated L4-L5 X 2     HYSTERECTOMY, PAP NO LONGER INDICATED  2005     HYSTERECTOMY, VAGINAL  6/28/05    Ovaries in Place     Social History     Socioeconomic History     Marital status:      Spouse name: Not on file     Number of children: Not on file     Years of education: Not on file     Highest education level: Not on file   Occupational History     Not on file   Social " Needs     Financial resource strain: Not on file     Food insecurity:     Worry: Not on file     Inability: Not on file     Transportation needs:     Medical: Not on file     Non-medical: Not on file   Tobacco Use     Smoking status: Current Every Day Smoker     Packs/day: 1.50     Years: 30.00     Pack years: 45.00     Types: Cigarettes     Smokeless tobacco: Never Used     Tobacco comment: 1/2 pack to 1 ppd, has an ecig   Substance and Sexual Activity     Alcohol use: Yes     Alcohol/week: 3.0 - 6.0 oz     Types: 5 - 10 Standard drinks or equivalent per week     Comment: 2beers every few fews     Drug use: Yes     Comment: marijuana     Sexual activity: Yes     Partners: Male   Lifestyle     Physical activity:     Days per week: Not on file     Minutes per session: Not on file     Stress: Not on file   Relationships     Social connections:     Talks on phone: Not on file     Gets together: Not on file     Attends Restorationist service: Not on file     Active member of club or organization: Not on file     Attends meetings of clubs or organizations: Not on file     Relationship status: Not on file     Intimate partner violence:     Fear of current or ex partner: Not on file     Emotionally abused: Not on file     Physically abused: Not on file     Forced sexual activity: Not on file   Other Topics Concern     Parent/sibling w/ CABG, MI or angioplasty before 65F 55M? Yes      Service No     Blood Transfusions No     Caffeine Concern No     Occupational Exposure No     Hobby Hazards No     Sleep Concern Yes     Stress Concern Yes     Weight Concern Yes     Special Diet No     Back Care Yes     Exercise Yes     Bike Helmet No     Seat Belt Yes     Self-Exams No   Social History Narrative     Not on file     Family History   Problem Relation Age of Onset     Diabetes Mother      Hypertension Mother      Gynecology Mother      Arthritis Mother      Thyroid Disease Mother      Allergies Mother      Lipids Father       "Heart Disease Father      C.A.D. Father      Hypertension Maternal Grandmother      Arthritis Maternal Grandmother      Cancer Maternal Grandmother      Cancer Maternal Grandfather      C.A.D. Maternal Grandfather      Asthma Paternal Grandmother      C.A.D. Paternal Grandmother      Cerebrovascular Disease Paternal Grandfather      Alzheimer Disease Paternal Grandfather      Arthritis Paternal Grandfather      C.A.D. Paternal Grandfather      Cardiovascular Paternal Grandfather      Circulatory Paternal Grandfather      Glaucoma No family hx of      Macular Degeneration No family hx of         ROS: 10 point ROS neg other than the symptoms noted above in the HPI.    Physical Exam  /86   Pulse 107   Temp 98  F (36.7  C) (Oral)   Resp 18   Ht 1.638 m (5' 4.5\")   Wt 86.4 kg (190 lb 6.4 oz)   SpO2 93%   BMI 32.18 kg/m     HEENT:  Normocephalic, atraumatic.  PERRLA.  EOM s intact.  Visual fields full to gross exam  Neck:  Supple, non-tender, without lymphadenopathy.  Heart:  No peripheral edema  Lungs:  No SOB  Abdomen:  Non-distended.   Skin:  Warm and dry.  Extremities:  No edema, cyanosis or clubbing.  Psychiatric:  No apparent distress  Musculoskeletal:  Normal bulk and tone    NEUROLOGICAL EXAMINATION:     Mental status:  Alert and Oriented x 3, speech is fluent.  Cranial nerves:  II-XII intact.   Motor:    Shoulder Abduction:  Right:  5/5   Left:  5/5  Biceps:                      Right:  5/5   Left:  5/5  Triceps:                     Right:  5/5   Left:  5/5  Wrist Extensors:       Right:  5/5   Left:  5/5  Wrist Flexors:           Right:  5/5   Left:  5/5  interosseus :            Right:  5/5   Left:  5/5  Hip Flexion:                Right: 5/5  Left:  5/5  Quadriceps:             Right:  5/5  Left:  5/5  Hamstrings:             Right:  5/5  Left:  5/5  Gastroc Soleus:        Right:  5/5  Left:  5/5  Tib/Ant:                      Right:  5/5  Left:  5/5  EHL:                     Right:  5/5  Left:  " 5/5  Sensation:  Intact  Reflexes:  Negative Babinski.  Negative Clonus.  Negative Colvin's.  Coordination:  Smooth finger to nose testing.   Negative pronator drift.  Smooth tandem walking.    A/P:  58F w/ hx right L5-S1 microdiscectomy, p/w back pain, left leg pain, L4-5 facet inflammation    I had a discussion with the patient, reviewing the history, symptoms, and imaging  Will plan for L4-5 MBB/RFA         Again, thank you for allowing me to participate in the care of your patient.        Sincerely,        Ian Lomeli MD

## 2019-09-05 NOTE — PROGRESS NOTES
"I was asked by Dr. Fierro to see this patient in consultation    58F w/ hx right L5-S1 microdiscectomy, p/w back pain, left leg pain, L4-5 facet inflammation.    3 months of predominantly left low back pain, with radiation to the buttocks, worse at work.  Also with sharp radiation to the left posterior thigh and calf.  Has been doing chiropractic care without improvement.  MR with right L5-S1 hemilaminectomy, acute L4-5 facet inflammation, moderate stenosis and lateral recess stenosis.       Past Medical History:   Diagnosis Date     Advanced directives, counseling/discussion 2/26/2013    Patient does not have an Advance/Health Care Directive (HCD), given \"What is Advance Care Planning?\" flyer.  Maureen Iglesias February 26, 2013      Arthritis      Disorders of bursae and tendons in shoulder region, unspecified 6/8/2010     Dysfunctional uterine bleeding      History of substance abuse 2006    cocaine, completed rehab     Hyperlipidemia      Insomnia      Lyme disease 1990     Pain in shoulder 3/16/2010     Seasonal allergies      Tobacco use disorders      Type 2 diabetes, HbA1c goal < 7% (H) 7/18/2013     Past Surgical History:   Procedure Laterality Date     C SHOULDER SURG PROC UNLISTED  1992    X 3 (broken clavicle and rotator cuff tear with impingement     HC SACROPLASTY  1990's    Herniated L4-L5 X 2     HYSTERECTOMY, PAP NO LONGER INDICATED  2005     HYSTERECTOMY, VAGINAL  6/28/05    Ovaries in Place     Social History     Socioeconomic History     Marital status:      Spouse name: Not on file     Number of children: Not on file     Years of education: Not on file     Highest education level: Not on file   Occupational History     Not on file   Social Needs     Financial resource strain: Not on file     Food insecurity:     Worry: Not on file     Inability: Not on file     Transportation needs:     Medical: Not on file     Non-medical: Not on file   Tobacco Use     Smoking status: Current Every Day " Smoker     Packs/day: 1.50     Years: 30.00     Pack years: 45.00     Types: Cigarettes     Smokeless tobacco: Never Used     Tobacco comment: 1/2 pack to 1 ppd, has an ecig   Substance and Sexual Activity     Alcohol use: Yes     Alcohol/week: 3.0 - 6.0 oz     Types: 5 - 10 Standard drinks or equivalent per week     Comment: 2beers every few fews     Drug use: Yes     Comment: marijuana     Sexual activity: Yes     Partners: Male   Lifestyle     Physical activity:     Days per week: Not on file     Minutes per session: Not on file     Stress: Not on file   Relationships     Social connections:     Talks on phone: Not on file     Gets together: Not on file     Attends Christian service: Not on file     Active member of club or organization: Not on file     Attends meetings of clubs or organizations: Not on file     Relationship status: Not on file     Intimate partner violence:     Fear of current or ex partner: Not on file     Emotionally abused: Not on file     Physically abused: Not on file     Forced sexual activity: Not on file   Other Topics Concern     Parent/sibling w/ CABG, MI or angioplasty before 65F 55M? Yes      Service No     Blood Transfusions No     Caffeine Concern No     Occupational Exposure No     Hobby Hazards No     Sleep Concern Yes     Stress Concern Yes     Weight Concern Yes     Special Diet No     Back Care Yes     Exercise Yes     Bike Helmet No     Seat Belt Yes     Self-Exams No   Social History Narrative     Not on file     Family History   Problem Relation Age of Onset     Diabetes Mother      Hypertension Mother      Gynecology Mother      Arthritis Mother      Thyroid Disease Mother      Allergies Mother      Lipids Father      Heart Disease Father      C.A.D. Father      Hypertension Maternal Grandmother      Arthritis Maternal Grandmother      Cancer Maternal Grandmother      Cancer Maternal Grandfather      C.A.D. Maternal Grandfather      Asthma Paternal Grandmother       "KIARA Paternal Grandmother      Cerebrovascular Disease Paternal Grandfather      Alzheimer Disease Paternal Grandfather      Arthritis Paternal Grandfather      TOBIN. Paternal Grandfather      Cardiovascular Paternal Grandfather      Circulatory Paternal Grandfather      Glaucoma No family hx of      Macular Degeneration No family hx of         ROS: 10 point ROS neg other than the symptoms noted above in the HPI.    Physical Exam  /86   Pulse 107   Temp 98  F (36.7  C) (Oral)   Resp 18   Ht 1.638 m (5' 4.5\")   Wt 86.4 kg (190 lb 6.4 oz)   SpO2 93%   BMI 32.18 kg/m    HEENT:  Normocephalic, atraumatic.  PERRLA.  EOM s intact.  Visual fields full to gross exam  Neck:  Supple, non-tender, without lymphadenopathy.  Heart:  No peripheral edema  Lungs:  No SOB  Abdomen:  Non-distended.   Skin:  Warm and dry.  Extremities:  No edema, cyanosis or clubbing.  Psychiatric:  No apparent distress  Musculoskeletal:  Normal bulk and tone    NEUROLOGICAL EXAMINATION:     Mental status:  Alert and Oriented x 3, speech is fluent.  Cranial nerves:  II-XII intact.   Motor:    Shoulder Abduction:  Right:  5/5   Left:  5/5  Biceps:                      Right:  5/5   Left:  5/5  Triceps:                     Right:  5/5   Left:  5/5  Wrist Extensors:       Right:  5/5   Left:  5/5  Wrist Flexors:           Right:  5/5   Left:  5/5  interosseus :            Right:  5/5   Left:  5/5  Hip Flexion:                Right: 5/5  Left:  5/5  Quadriceps:             Right:  5/5  Left:  5/5  Hamstrings:             Right:  5/5  Left:  5/5  Gastroc Soleus:        Right:  5/5  Left:  5/5  Tib/Ant:                      Right:  5/5  Left:  5/5  EHL:                     Right:  5/5  Left:  5/5  Sensation:  Intact  Reflexes:  Negative Babinski.  Negative Clonus.  Negative Colvin's.  Coordination:  Smooth finger to nose testing.   Negative pronator drift.  Smooth tandem walking.    A/P:  58F w/ hx right L5-S1 microdiscectomy, p/w back pain, " left leg pain, L4-5 facet inflammation    I had a discussion with the patient, reviewing the history, symptoms, and imaging  Will plan for L4-5 MBB/RFA

## 2019-09-06 NOTE — TELEPHONE ENCOUNTER
Please call Pily,   Does she want me to call her, as I see she was just in with the neurosurgeon yesterday.  If she has any further questions please route message back to me and I'll call her Monday. If her questions were answered by the specialist, ok to close encounter.      Kath Fierro PA-C

## 2019-09-06 NOTE — TELEPHONE ENCOUNTER
Called Pily, and she would like to know if she will need to collect paperwork for disability? OK to respond anytime next week.    Dania Bellamy CMA

## 2019-09-09 NOTE — TELEPHONE ENCOUNTER
I typically defer disability paperwork to the treating specialist.  So if you need this for your back, I suggest you discuss further with Dr. Lomeli.     Kath Fierro PA-C

## 2019-10-01 DIAGNOSIS — K21.9 LPRD (LARYNGOPHARYNGEAL REFLUX DISEASE): ICD-10-CM

## 2019-10-01 RX ORDER — LORATADINE 10 MG/1
10 TABLET ORAL DAILY
Qty: 90 TABLET | Refills: 0 | Status: SHIPPED | OUTPATIENT
Start: 2019-10-01 | End: 2020-06-05

## 2019-10-01 NOTE — TELEPHONE ENCOUNTER
"Requested Prescriptions   Pending Prescriptions Disp Refills     loratadine (CLARITIN) 10 MG tablet 90 tablet 0     Sig: Take 1 tablet (10 mg) by mouth daily  Last Written Prescription Date:  10/18/2018 #90 x 0  Last filled - not provided  Last office visit: 8/19/2019 REGINALD Fierro   Future Office Visit:  None         Antihistamines Protocol Passed - 10/1/2019  4:24 PM        Passed - Patient is 3-64 years of age     Apply weight-based dosing for peds patients age 3 - 12 years of age.    Forward request to provider for patients under the age of 3 or over the age of 64.          Passed - Recent (12 mo) or future (30 days) visit within the authorizing provider's specialty     Patient has had an office visit with the authorizing provider or a provider within the authorizing providers department within the previous 12 mos or has a future within next 30 days. See \"Patient Info\" tab in inbasket, or \"Choose Columns\" in Meds & Orders section of the refill encounter.              Passed - Medication is active on med list          "

## 2019-10-01 NOTE — TELEPHONE ENCOUNTER
Prescription approved per [facility] Refill Protocol.    Vilma Ordonez RN

## 2019-11-13 DIAGNOSIS — F33.1 MAJOR DEPRESSIVE DISORDER, RECURRENT EPISODE, MODERATE (H): ICD-10-CM

## 2019-11-13 DIAGNOSIS — M54.16 LUMBAR RADICULOPATHY: ICD-10-CM

## 2019-11-13 DIAGNOSIS — G89.29 CHRONIC SHOULDER PAIN, UNSPECIFIED LATERALITY: ICD-10-CM

## 2019-11-13 DIAGNOSIS — M25.519 CHRONIC SHOULDER PAIN, UNSPECIFIED LATERALITY: ICD-10-CM

## 2019-11-14 NOTE — TELEPHONE ENCOUNTER
"Requested Prescriptions   Pending Prescriptions Disp Refills     traZODone (DESYREL) 100 MG tablet 180 tablet 1     Sig: TAKE 2 TABLETS BY MOUTH AT BEDTIME  Last Written Prescription Date:  06/10/2019 #180 x 1  Last filled - not provided  Last office visit: 8/19/2019 [facility]   Future Office Visit:  None         Serotonin Modulators Passed - 11/13/2019  2:54 PM        Passed - Recent (12 mo) or future (30 days) visit within the authorizing provider's specialty     Patient has had an office visit with the authorizing provider or a provider within the authorizing providers department within the previous 12 mos or has a future within next 30 days. See \"Patient Info\" tab in inbasket, or \"Choose Columns\" in Meds & Orders section of the refill encounter.              Passed - Medication is active on med list        Passed - Patient is age 18 or older        Passed - No active pregnancy on record        Passed - No positive pregnancy test in past 12 months        diclofenac (VOLTAREN) 75 MG EC tablet 180 tablet 0     Sig: TAKE 1 TABLET BY MOUTH TWICE DAILY AS NEEDED FOR MODERATE PAIN.  Last Written Prescription Date:  06/10/2019 #180 x 0  Last filled - not provided  Last office visit: 8/19/2019 [facility]   Future Office Visit:  None         NSAID Medications Failed - 11/13/2019  2:54 PM        Failed - Normal CBC on file in past 12 months     Recent Labs   Lab Test 12/21/16  0734   WBC 8.5   RBC 4.39   HGB 13.4   HCT 41.8                    Passed - Blood pressure under 140/90 in past 12 months     BP Readings from Last 3 Encounters:   09/05/19 127/86   08/19/19 124/82   07/24/19 128/60                 Passed - Normal ALT on file in past 12 months     Recent Labs   Lab Test 06/10/19  1013   ALT 39             Passed - Normal AST on file in past 12 months     Recent Labs   Lab Test 06/10/19  1013   AST 20             Passed - Recent (12 mo) or future (30 days) visit within the authorizing provider's specialty     " "Patient has had an office visit with the authorizing provider or a provider within the authorizing providers department within the previous 12 mos or has a future within next 30 days. See \"Patient Info\" tab in inbasket, or \"Choose Columns\" in Meds & Orders section of the refill encounter.              Passed - Patient is age 6-64 years        Passed - Medication is active on med list        Passed - No active pregnancy on record        Passed - Normal serum creatinine on file in past 12 months     Recent Labs   Lab Test 06/10/19  1013   CR 0.71             Passed - No positive pregnancy test in past 12 months          "

## 2019-11-14 NOTE — TELEPHONE ENCOUNTER
Routing refill request for trazodone to provider for review/approval because: Last PHQ-9 score was 14      PHQ-9 SCORE 7/7/2017 12/4/2017 6/10/2019   PHQ-9 Total Score - - -   PHQ-9 Total Score 11 12 14     Routing refill request for diclofenac to provider for review/approval because: Labs not current:  GEORGIANA BRUNNER RN

## 2019-11-18 RX ORDER — DICLOFENAC SODIUM 75 MG/1
TABLET, DELAYED RELEASE ORAL
Qty: 180 TABLET | Refills: 1 | Status: SHIPPED | OUTPATIENT
Start: 2019-11-18 | End: 2020-05-11

## 2019-11-18 RX ORDER — TRAZODONE HYDROCHLORIDE 100 MG/1
TABLET ORAL
Qty: 180 TABLET | Refills: 1 | Status: SHIPPED | OUTPATIENT
Start: 2019-11-18 | End: 2020-05-11

## 2019-11-21 ENCOUNTER — TELEPHONE (OUTPATIENT)
Dept: FAMILY MEDICINE | Facility: CLINIC | Age: 59
End: 2019-11-21

## 2019-11-21 DIAGNOSIS — G89.29 CHRONIC BILATERAL LOW BACK PAIN WITHOUT SCIATICA: Primary | ICD-10-CM

## 2019-11-21 DIAGNOSIS — M54.50 CHRONIC BILATERAL LOW BACK PAIN WITHOUT SCIATICA: Primary | ICD-10-CM

## 2019-11-21 NOTE — TELEPHONE ENCOUNTER
What is this place?  Orthopedics, neurosurgery, pain clinic, physical therapy?    Rach Clemens, DO

## 2019-11-21 NOTE — TELEPHONE ENCOUNTER
Patient called and is asking for a referral for [facility] for her back.  She says the injections are not working.  When referral in call patient and she will provide us with fax number.    Vilma Calix [facility]

## 2019-11-22 NOTE — TELEPHONE ENCOUNTER
Called pt for contact information.  [facility]   [address]ustin Dillard?  Phone [phone]  Fax [phone]    Natalia Stevenson  [place]

## 2020-01-08 ENCOUNTER — OFFICE VISIT (OUTPATIENT)
Dept: PODIATRY | Facility: CLINIC | Age: 60
End: 2020-01-08
Payer: COMMERCIAL

## 2020-01-08 VITALS
WEIGHT: 192 LBS | SYSTOLIC BLOOD PRESSURE: 122 MMHG | BODY MASS INDEX: 30.86 KG/M2 | DIASTOLIC BLOOD PRESSURE: 70 MMHG | HEART RATE: 78 BPM | HEIGHT: 66 IN

## 2020-01-08 DIAGNOSIS — M79.674 PAIN OF TOE OF RIGHT FOOT: Primary | ICD-10-CM

## 2020-01-08 PROCEDURE — 99213 OFFICE O/P EST LOW 20 MIN: CPT | Performed by: PODIATRIST

## 2020-01-08 ASSESSMENT — MIFFLIN-ST. JEOR: SCORE: 1459.72

## 2020-01-08 NOTE — PATIENT INSTRUCTIONS
Weight management plan: Patient was referred to their PCP to discuss a diet and exercise plan.  We wish you continued good healing. If you have any questions or concerns, please do not hesitate to contact us at [phone]    Please remember to call and schedule a follow up appointment if one was recommended at your earliest convenience.   PODIATRY CLINIC HOURS  TELEPHONE NUMBER    Dr. Cain Epstein D.P.M [facility]    Clinics:  [facility]    [address]ottie Jade [facility]   Tuesday 1PM-6PM  [place]  Wednesday 7AM-2PM  [facility]  Thursday 10AM-6PM  [place]  Friday 7AM-3PM  [place]  Specialty schedulers:   [phone] to make an appointment with any Specialty Provider.        Urgent Care locations:    [facility] Monday-Friday 5 pm - 9 pm. Saturday-Sunday 9 am -5pm    Monday-Friday 11 am - 9 pm Saturday 9 am - 5 pm     Monday-Sunday 12 noon-8PM [phone]     If you need a medication refill, please contact us you may need lab work and/or a follow up visit prior to your refill (i.e. Antifungal medications).    [employer] (secure e-mail communication and access to your chart) to send a message or to make an appointment.    If MRI needed please call [facility] at [phone]        SMOKING CESSATION  What's in cigarette smoke? - Cigarette smoke contains over 4,000 chemicals. Nicotine is one of the main ingredients which is an insecticide/herbicide. It is poisonous to our nervous system, increases blood clotting risk, and decreases the body's defenses to fight off infection. Another chemical is Carbon Monoxide is an asphyxiating gas that permanently binds to blood cells and blocks the transport of oxygen. This leads to tissue death and decreases your metabolism. Tar is a chemical that coats your lungs and trachea which impairs new oxygen coming in and carbon dioxide getting out  of your body.   How does smoking impact surgery? - Smoking is particularly hazardous with regards to surgery. Surgery puts stress on the body and a smoker's body is already under strain from these chemicals. Putting the two together, especially for an elective surgery, could be a recipe for disaster. Smoking before and after surgery increases your risk of heart problems, slow wound healing, delayed bone healing, blood clots, wound infection and anesthesia complications.   What are the benefits of quitting? - In 20 minutes your blood pressure will drop back down to normal. In 8 hours the carbon monoxide (a toxic gas) levels in your blood stream will drop by half, and oxygen levels will return to normal. In 48 hours your chance of having a heart attack will have decreased. All nicotine will have left your body. Your sense of taste and smell will return to a normal level. In 72 hours your bronchial tubes will relax, and your energy levels will increase. In 2 weeks your circulation will increase, and it will continue to improve for the next 10 weeks.    Recommendations for elective surgery - Ideally, patients should quit smoking 8 weeks before and at least 2 weeks after elective surgery in order to avoid complications. Simply cutting back on the amount of cigarettes smoked per day does not offer any benefit or decrease the risk of poor wound healing, heart problems, and infection. Smokers should also start taking Vitamin C and B for two weeks before surgery and two weeks after surgery.    Ways to Stop Smokin. Nicotine patches, lozenges, or gum  2. Support Groups  3. Medications (see below)    List of Medications:  1. Varenicline Tartrate (CHANTIX)   2. Bupropion HCL (WELLBUTRIN, ZYBAN) - note: make sure Wellbutrin is for smoking cessation and not other issues   3. Nicotine Patch (NICODERM)   4. Nicotine Inhaler (NICOTROL)   5. Nicotine Gum Nicotine Polacrilex   6. Nicotine Lozenge: Nicotine Polacrilex (COMMIT)    * [place] offers a smoking support group as well!  Please visit: [email or web address]  If you are interested in these, ask about getting a prescription or talk to your primary care doctor about what may be the best way for you to quit.

## 2020-01-08 NOTE — PROGRESS NOTES
Subjective:    7/24/19   Pt is seen today w/ the c/c of a painful ingrown right great nail lateral border.  This has been problematic for 1 month(s). negativehistory of drainage from the site. This is slowly getting worse.  Aggravated by activity and relieved by rest.  Has tried soaking which has not helped.   Patient has diabetes.  She has had a temporary removal done in 2018 in 2017.  She would like a permanent removal done.  She denies any erythema edema purulence or odor    1/8/20   patient returns for evaluation of right great nail lateral border.  Drained for several weeks after the procedure done on 7/24/2019 then stop.  It is recently become painful now.  She denies any trauma.  She denies any erythema or edema at all.  Patient has diabetes.  She is a smoker.    ROS: See above         Allergies   Allergen Reactions     Quetiapine Anaphylaxis     Seroquel [Quetiapine Fumarate]      Insomnia  , anhedonia       Ambien [Zolpidem Tartrate] Difficulty breathing     Reaction after taking 5 mg on Wed, Thurs, then drank Friday afternoon and developed symptoms,  Irrability.       Chantix [Varenicline]      Zolpidem Unknown       Current Outpatient Medications   Medication Sig Dispense Refill     albuterol (VENTOLIN HFA) 108 (90 Base) MCG/ACT inhaler INHALE 2 PUFFS EVERY 6 HOURS AS NEEDED FOR SHORTNESS OF BREATH OR DYSPNEA 36 g 1     alcohol swab prep pads Use to swab area of injection/mike as directed 100 each 3     aspirin 81 MG tablet Take 1 tablet (81 mg) by mouth At Bedtime 90 tablet 3     blood glucose (NO BRAND SPECIFIED) test strip Use to test blood sugar one time daily or as directed. To accompany: Blood Glucose Monitor Brands: per insurance. 100 strip 6     blood glucose monitoring (NO BRAND SPECIFIED) meter device kit Use to test blood sugar one time daily or as directed. 1 kit 0     buPROPion (WELLBUTRIN SR) 150 MG 12 hr tablet Take 1 tablet (150 mg) by mouth 2 times daily 180 tablet 1     busPIRone  (BUSPAR) 15 MG tablet Take 1 tablet (15 mg) by mouth 2 times daily 180 tablet 1     diclofenac (VOLTAREN) 75 MG EC tablet TAKE 1 TABLET BY MOUTH TWICE DAILY AS NEEDED FOR MODERATE PAIN. 180 tablet 1     fluticasone (FLONASE) 50 MCG/ACT nasal spray Spray 2 sprays into both nostrils daily 48 g 1     gabapentin (NEURONTIN) 300 MG capsule TAKE 3 CAPSULES BY MOUTH THREE TIMES DAILY 810 capsule 1     lisinopril (PRINIVIL/ZESTRIL) 10 MG tablet Take 1 tablet (10 mg) by mouth daily 90 tablet 1     loratadine (CLARITIN) 10 MG tablet Take 1 tablet (10 mg) by mouth daily 90 tablet 0     metFORMIN (GLUCOPHAGE-XR) 500 MG 24 hr tablet Take 4 tablets (2,000 mg) by mouth daily (with dinner) 360 tablet 1     methocarbamol (ROBAXIN) 500 MG tablet Take 2 tablets (1,000 mg) by mouth 3 times daily as needed for muscle spasms 270 tablet 1     mometasone-formoterol (DULERA) 200-5 MCG/ACT inhaler Inhale 2 puffs into the lungs 2 times daily 3 Inhaler 1     omeprazole (PRILOSEC) 20 MG DR capsule Take 1 capsule (20 mg) by mouth daily as needed (GERD) 90 capsule 1     order for DME Diabetic test strips and lancets per insurance formulary Check blood sugar once per day 3 Month 1     order for DME 1 glucometer per insurance formulary 1 Device 0     oxybutynin ER (DITROPAN-XL) 5 MG 24 hr tablet   0     simvastatin (ZOCOR) 40 MG tablet Take 1 tablet (40 mg) by mouth At Bedtime 90 tablet 1     traZODone (DESYREL) 100 MG tablet TAKE 2 TABLETS BY MOUTH AT BEDTIME 180 tablet 1       Patient Active Problem List   Diagnosis     Insomnia     Chronic low back pain     Elevated liver enzymes     Moderate major depression (H)     Disorder of bursae and tendons in shoulder region     Chronic bronchitis with COPD (chronic obstructive pulmonary disease) (H)     Advance care planning     Type 2 diabetes, HbA1c goal < 7% (H)     Hyperlipidemia LDL goal <100     Impingement syndrome, shoulder     Rotator cuff tear     AC separation     Tobacco use disorder      "Anxiety     Chronic shoulder pain, unspecified laterality     CKD (chronic kidney disease) stage 2, GFR 60-89 ml/min     Macular degeneration     Type II diabetes mellitus with stage 2 chronic kidney disease (H)     Non morbid obesity due to excess calories     Menopausal symptoms     Chronic bilateral low back pain without sciatica     Chronic pain syndrome     Methamphetamine use (H)     Type 2 diabetes mellitus without retinopathy (H)     H/O alcohol abuse     GUDELIA (generalized anxiety disorder)     Elevated blood pressure reading without diagnosis of hypertension     Ingrown toenail without infection       Past Medical History:   Diagnosis Date     Advanced directives, counseling/discussion 2/26/2013    Patient does not have an Advance/Health Care Directive (HCD), given \"What is Advance Care Planning?\" flyer.  Maureen Iglesias February 26, 2013      Arthritis      Disorders of bursae and tendons in shoulder region, unspecified 6/8/2010     Dysfunctional uterine bleeding      History of substance abuse (H) 2006    cocaine, completed rehab     Hyperlipidemia      Insomnia      Lyme disease 1990     Pain in shoulder 3/16/2010     Seasonal allergies      Tobacco use disorders      Type 2 diabetes, HbA1c goal < 7% (H) 7/18/2013       Past Surgical History:   Procedure Laterality Date     C SHOULDER SURG PROC UNLISTED  1992    X 3 (broken clavicle and rotator cuff tear with impingement     HC SACROPLASTY  1990's    Herniated L4-L5 X 2     HYSTERECTOMY, PAP NO LONGER INDICATED  2005     HYSTERECTOMY, VAGINAL  6/28/05    Ovaries in Place       Family History   Problem Relation Age of Onset     Diabetes Mother      Hypertension Mother      Gynecology Mother      Arthritis Mother      Thyroid Disease Mother      Allergies Mother      Lipids Father      Heart Disease Father      C.A.D. Father      Hypertension Maternal Grandmother      Arthritis Maternal Grandmother      Cancer Maternal Grandmother      Cancer Maternal " "Grandfather      TOBIN. Maternal Grandfather      Asthma Paternal Grandmother      TOBIN. Paternal Grandmother      Cerebrovascular Disease Paternal Grandfather      Alzheimer Disease Paternal Grandfather      Arthritis Paternal Grandfather      C.A.D. Paternal Grandfather      Cardiovascular Paternal Grandfather      Circulatory Paternal Grandfather      Glaucoma No family hx of      Macular Degeneration No family hx of        Social History     Tobacco Use     Smoking status: Current Every Day Smoker     Packs/day: 1.50     Years: 30.00     Pack years: 45.00     Types: Cigarettes     Smokeless tobacco: Never Used     Tobacco comment: 1/2 pack to 1 ppd, has an ecig   Substance Use Topics     Alcohol use: Yes     Alcohol/week: 5.0 - 10.0 standard drinks     Types: 5 - 10 Standard drinks or equivalent per week     Comment: 2beers every few fews         Exam:    Vitals: /70 (BP Location: Right arm)   Pulse 78   Ht 1.664 m (5' 5.5\")   Wt 87.1 kg (192 lb)   BMI 31.46 kg/m    BMI: Body mass index is 31.46 kg/m .  Height: 5' 5.5\"    Constitutional/ general:  Pt is in no apparent distress, appears well-nourished.  Cooperative with history and physical exam.     Psych:  The patient answered questions appropriately.  Normal affect.  Seems to have reasonable expectations, in terms of treatment.     Eyes:  Visual scanning/ tracking without deficit.     Ears:  Response to auditory stimuli is normal.  negative hearing aid devices.  Auricles in proper alignment.     Lymphatic:  Popliteal lymph nodes not enlarged.     Lungs:  Non labored breathing, non labored speech. No cough.  No audible wheezing. Even, quiet breathing.       Vascular:  positive pedal pulses bilaterally for both the DP and PT arteries.  CFT < 3 sec.  negative ankle edema.  positive pedal hair growth.    Neuro:  Alert and oriented x 3. Coordinated gait.  Light touch sensation is intact to the L4, L5, S1 distributions. No obvious deficits.  No evidence " of neurological-based weakness, spasticity, or contracture in the lower extremities.     Derm: Normal texture and turgor.  No erythema, ecchymosis, or cyanosis.      Musculoskeletal:    Lower extremity muscle strength is normal.  Patient is ambulatory without an assistive device or brace.  Normal arch with weightbearing.  No forefoot or rear foot deformities noted.   Normal ROM all fore foot and rearfoot joints.  No equinus.  right great toe nail lateral border shows where permanent nail removal has been done in the past.  There is no new nail growing in.  The very proximal nail border of the nail is healthy.  There is a line across the nail.  Distal to this the nail is somewhat thick and loose.  With trimming this back and removing the underlying debris the nailbed is healthy with no ulceration    ASSESSMENT:    Right great lateral border pain status post phenol and alcohol.    I trimmed back the loose nail.  Reassured the patient no new nail is growing in.  Explained sometimes the nail can become thick and loose after this procedure.  Patient will keep debris cleaned from this border.  She will watch the line and hopefully this will all advance out with her new nail being more normal looking.  Return to clinic zeny Epstein DPM, FACFAS

## 2020-01-08 NOTE — LETTER
1/8/2020         RE: Pily Roche  [address]  [place] [address]        Dear Colleague,    Thank you for referring your patient, Pily Roche, to the [facility]. Please see a copy of my visit note below.    Subjective:    7/24/19   Pt is seen today w/ the c/c of a painful ingrown right great nail lateral border.  This has been problematic for 1 month(s). negativehistory of drainage from the site. This is slowly getting worse.  Aggravated by activity and relieved by rest.  Has tried soaking which has not helped.   Patient has diabetes.  She has had a temporary removal done in 2018 in 2017.  She would like a permanent removal done.  She denies any erythema edema purulence or odor    1/8/20   patient returns for evaluation of right great nail lateral border.  Drained for several weeks after the procedure done on 7/24/2019 then stop.  It is recently become painful now.  She denies any trauma.  She denies any erythema or edema at all.  Patient has diabetes.  She is a smoker.    ROS: See above         Allergies   Allergen Reactions     Quetiapine Anaphylaxis     Seroquel [Quetiapine Fumarate]      Insomnia  , anhedonia       Ambien [Zolpidem Tartrate] Difficulty breathing     Reaction after taking 5 mg on Wed, Thurs, then drank Friday afternoon and developed symptoms,  Irrability.       Chantix [Varenicline]      Zolpidem Unknown       Current Outpatient Medications   Medication Sig Dispense Refill     albuterol (VENTOLIN HFA) 108 (90 Base) MCG/ACT inhaler INHALE 2 PUFFS EVERY 6 HOURS AS NEEDED FOR SHORTNESS OF BREATH OR DYSPNEA 36 g 1     alcohol swab prep pads Use to swab area of injection/mike as directed 100 each 3     aspirin 81 MG tablet Take 1 tablet (81 mg) by mouth At Bedtime 90 tablet 3     blood glucose (NO BRAND SPECIFIED) test strip Use to test blood sugar one time daily or as directed. To accompany: Blood Glucose Monitor Brands: per insurance. 100 strip 6      blood glucose monitoring (NO BRAND SPECIFIED) meter device kit Use to test blood sugar one time daily or as directed. 1 kit 0     buPROPion (WELLBUTRIN SR) 150 MG 12 hr tablet Take 1 tablet (150 mg) by mouth 2 times daily 180 tablet 1     busPIRone (BUSPAR) 15 MG tablet Take 1 tablet (15 mg) by mouth 2 times daily 180 tablet 1     diclofenac (VOLTAREN) 75 MG EC tablet TAKE 1 TABLET BY MOUTH TWICE DAILY AS NEEDED FOR MODERATE PAIN. 180 tablet 1     fluticasone (FLONASE) 50 MCG/ACT nasal spray Spray 2 sprays into both nostrils daily 48 g 1     gabapentin (NEURONTIN) 300 MG capsule TAKE 3 CAPSULES BY MOUTH THREE TIMES DAILY 810 capsule 1     lisinopril (PRINIVIL/ZESTRIL) 10 MG tablet Take 1 tablet (10 mg) by mouth daily 90 tablet 1     loratadine (CLARITIN) 10 MG tablet Take 1 tablet (10 mg) by mouth daily 90 tablet 0     metFORMIN (GLUCOPHAGE-XR) 500 MG 24 hr tablet Take 4 tablets (2,000 mg) by mouth daily (with dinner) 360 tablet 1     methocarbamol (ROBAXIN) 500 MG tablet Take 2 tablets (1,000 mg) by mouth 3 times daily as needed for muscle spasms 270 tablet 1     mometasone-formoterol (DULERA) 200-5 MCG/ACT inhaler Inhale 2 puffs into the lungs 2 times daily 3 Inhaler 1     omeprazole (PRILOSEC) 20 MG DR capsule Take 1 capsule (20 mg) by mouth daily as needed (GERD) 90 capsule 1     order for DME Diabetic test strips and lancets per insurance formulary Check blood sugar once per day 3 Month 1     order for DME 1 glucometer per insurance formulary 1 Device 0     oxybutynin ER (DITROPAN-XL) 5 MG 24 hr tablet   0     simvastatin (ZOCOR) 40 MG tablet Take 1 tablet (40 mg) by mouth At Bedtime 90 tablet 1     traZODone (DESYREL) 100 MG tablet TAKE 2 TABLETS BY MOUTH AT BEDTIME 180 tablet 1       Patient Active Problem List   Diagnosis     Insomnia     Chronic low back pain     Elevated liver enzymes     Moderate major depression (H)     Disorder of bursae and tendons in shoulder region     Chronic bronchitis with COPD  "(chronic obstructive pulmonary disease) (H)     Advance care planning     Type 2 diabetes, HbA1c goal < 7% (H)     Hyperlipidemia LDL goal <100     Impingement syndrome, shoulder     Rotator cuff tear     AC separation     Tobacco use disorder     Anxiety     Chronic shoulder pain, unspecified laterality     CKD (chronic kidney disease) stage 2, GFR 60-89 ml/min     Macular degeneration     Type II diabetes mellitus with stage 2 chronic kidney disease (H)     Non morbid obesity due to excess calories     Menopausal symptoms     Chronic bilateral low back pain without sciatica     Chronic pain syndrome     Methamphetamine use (H)     Type 2 diabetes mellitus without retinopathy (H)     H/O alcohol abuse     GUDELIA (generalized anxiety disorder)     Elevated blood pressure reading without diagnosis of hypertension     Ingrown toenail without infection       Past Medical History:   Diagnosis Date     Advanced directives, counseling/discussion 2/26/2013    Patient does not have an Advance/Health Care Directive (HCD), given \"What is Advance Care Planning?\" flyer.  Maureen Iglesias February 26, 2013      Arthritis      Disorders of bursae and tendons in shoulder region, unspecified 6/8/2010     Dysfunctional uterine bleeding      History of substance abuse (H) 2006    cocaine, completed rehab     Hyperlipidemia      Insomnia      Lyme disease 1990     Pain in shoulder 3/16/2010     Seasonal allergies      Tobacco use disorders      Type 2 diabetes, HbA1c goal < 7% (H) 7/18/2013       Past Surgical History:   Procedure Laterality Date     C SHOULDER SURG PROC UNLISTED  1992    X 3 (broken clavicle and rotator cuff tear with impingement     HC SACROPLASTY  1990's    Herniated L4-L5 X 2     HYSTERECTOMY, PAP NO LONGER INDICATED  2005     HYSTERECTOMY, VAGINAL  6/28/05    Ovaries in Place       Family History   Problem Relation Age of Onset     Diabetes Mother      Hypertension Mother      Gynecology Mother      Arthritis " "Mother      Thyroid Disease Mother      Allergies Mother      Lipids Father      Heart Disease Father      C.A.D. Father      Hypertension Maternal Grandmother      Arthritis Maternal Grandmother      Cancer Maternal Grandmother      Cancer Maternal Grandfather      C.A.D. Maternal Grandfather      Asthma Paternal Grandmother      C.A.D. Paternal Grandmother      Cerebrovascular Disease Paternal Grandfather      Alzheimer Disease Paternal Grandfather      Arthritis Paternal Grandfather      C.A.D. Paternal Grandfather      Cardiovascular Paternal Grandfather      Circulatory Paternal Grandfather      Glaucoma No family hx of      Macular Degeneration No family hx of        Social History     Tobacco Use     Smoking status: Current Every Day Smoker     Packs/day: 1.50     Years: 30.00     Pack years: 45.00     Types: Cigarettes     Smokeless tobacco: Never Used     Tobacco comment: 1/2 pack to 1 ppd, has an ecig   Substance Use Topics     Alcohol use: Yes     Alcohol/week: 5.0 - 10.0 standard drinks     Types: 5 - 10 Standard drinks or equivalent per week     Comment: 2beers every few fews         Exam:    Vitals: /70 (BP Location: Right arm)   Pulse 78   Ht 1.664 m (5' 5.5\")   Wt 87.1 kg (192 lb)   BMI 31.46 kg/m     BMI: Body mass index is 31.46 kg/m .  Height: 5' 5.5\"    Constitutional/ general:  Pt is in no apparent distress, appears well-nourished.  Cooperative with history and physical exam.     Psych:  The patient answered questions appropriately.  Normal affect.  Seems to have reasonable expectations, in terms of treatment.     Eyes:  Visual scanning/ tracking without deficit.     Ears:  Response to auditory stimuli is normal.  negative hearing aid devices.  Auricles in proper alignment.     Lymphatic:  Popliteal lymph nodes not enlarged.     Lungs:  Non labored breathing, non labored speech. No cough.  No audible wheezing. Even, quiet breathing.       Vascular:  positive pedal pulses bilaterally " for both the DP and PT arteries.  CFT < 3 sec.  negative ankle edema.  positive pedal hair growth.    Neuro:  Alert and oriented x 3. Coordinated gait.  Light touch sensation is intact to the L4, L5, S1 distributions. No obvious deficits.  No evidence of neurological-based weakness, spasticity, or contracture in the lower extremities.     Derm: Normal texture and turgor.  No erythema, ecchymosis, or cyanosis.      Musculoskeletal:    Lower extremity muscle strength is normal.  Patient is ambulatory without an assistive device or brace.  Normal arch with weightbearing.  No forefoot or rear foot deformities noted.   Normal ROM all fore foot and rearfoot joints.  No equinus.  right great toe nail lateral border shows where permanent nail removal has been done in the past.  There is no new nail growing in.  The very proximal nail border of the nail is healthy.  There is a line across the nail.  Distal to this the nail is somewhat thick and loose.  With trimming this back and removing the underlying debris the nailbed is healthy with no ulceration    ASSESSMENT:    Right great lateral border pain status post phenol and alcohol.    I trimmed back the loose nail.  Reassured the patient no new nail is growing in.  Explained sometimes the nail can become thick and loose after this procedure.  Patient will keep debris cleaned from this border.  She will watch the line and hopefully this will all advance out with her new nail being more normal looking.  Return to clinic prmoe Epstein DPM, FACFAS        Again, thank you for allowing me to participate in the care of your patient.        Sincerely,        Cain Epstein DPM

## 2020-01-25 DIAGNOSIS — E78.5 HYPERLIPIDEMIA LDL GOAL <100: ICD-10-CM

## 2020-01-27 RX ORDER — SIMVASTATIN 40 MG
TABLET ORAL
Qty: 30 TABLET | Refills: 0 | Status: SHIPPED | OUTPATIENT
Start: 2020-01-27 | End: 2020-04-21

## 2020-01-27 NOTE — TELEPHONE ENCOUNTER
Prescription approved per [facility] [place] [place] Refill Protocol.  Thais Gonzalez RN

## 2020-01-27 NOTE — TELEPHONE ENCOUNTER
"Requested Prescriptions   Pending Prescriptions Disp Refills     simvastatin (ZOCOR) 40 MG tablet [Pharmacy Med Name: SIMVASTATIN 40MG] 30 tablet 1     SiT PO at bedtime  Last Written Prescription Date:  06/10/2019 #90 x 1  Last filled 10/14/2019 #30 x 1  Last office visit: 2019 REGINALD Fierro  Future Office Visit:  None         Statins Protocol Passed - 2020  9:17 AM        Passed - LDL on file in past 12 months     Recent Labs   Lab Test 06/10/19  1013   *             Passed - No abnormal creatine kinase in past 12 months     No lab results found.             Passed - Recent (12 mo) or future (30 days) visit within the authorizing provider's specialty     Patient has had an office visit with the authorizing provider or a provider within the authorizing providers department within the previous 12 mos or has a future within next 30 days. See \"Patient Info\" tab in inbasket, or \"Choose Columns\" in Meds & Orders section of the refill encounter.              Passed - Medication is active on med list        Passed - Patient is age 18 or older        Passed - No active pregnancy on record        Passed - No positive pregnancy test in past 12 months          "

## 2020-03-15 NOTE — TELEPHONE ENCOUNTER
Left msg for pt to call dr johns office for completion of paperwork.     Reyna Osorio, [place]      In my judgment no risk for PPH has been identified at this time.

## 2020-03-16 ENCOUNTER — TELEPHONE (OUTPATIENT)
Dept: NEUROSURGERY | Facility: OTHER | Age: 60
End: 2020-03-16

## 2020-03-16 DIAGNOSIS — M47.816 LUMBAR FACET ARTHROPATHY: Primary | ICD-10-CM

## 2020-03-16 NOTE — TELEPHONE ENCOUNTER
Patient is calling requesting another order be placed for an injection. Please call the patient back @ [phone] Okay to leave a message

## 2020-03-16 NOTE — TELEPHONE ENCOUNTER
Ok per Lala Fountain CNP to repeat TRIPP. Order placed and faxed to [facility] location. Called and left message informing patient.

## 2020-03-16 NOTE — TELEPHONE ENCOUNTER
Patient called clinic requesting to repeat injection.    Type of injection:L4-5 LESI    Most recent injection date:11/8/19    How long did injection provide symptomatic relief: almost 3 months    Current symptoms: Bilateral leg pain , low back pain pain, returned end of January and managing with advil, ice/heat , biofreeze     Number of injections in last 12 months: 2    Plan: Will route to care team for approval.       Per patient Alfonso no longer does injections in [place] location will need to do at [place] location. Patient reports on Baby aspirin daily at night and ok to leave detailed message if needed.

## 2020-03-24 DIAGNOSIS — M25.519 CHRONIC SHOULDER PAIN, UNSPECIFIED LATERALITY: ICD-10-CM

## 2020-03-24 DIAGNOSIS — G89.29 CHRONIC SHOULDER PAIN, UNSPECIFIED LATERALITY: ICD-10-CM

## 2020-03-25 NOTE — TELEPHONE ENCOUNTER
Routing refill request to provider for review/approval because:  Drug not on the [facility] refill protocol     Francine Paz RN

## 2020-03-25 NOTE — TELEPHONE ENCOUNTER
Requested Prescriptions   Pending Prescriptions Disp Refills     methocarbamol (ROBAXIN) 500 MG tablet [Pharmacy Med Name: METHOCARBAM 500MG]  Last Written Prescription Date:  6/10/19  Last Fill Quantity: 270,  # refills: 1   Last office visit: 8/19/2019 with prescribing provider:  francisco   Future Office Visit:     180 tablet 0     Sig: TAKE 2 TABLETS BY MOUTH THREE TIMES DAILY AS NEEDED MUSCLE SPASMS       There is no refill protocol information for this order

## 2020-03-27 RX ORDER — METHOCARBAMOL 500 MG/1
TABLET, FILM COATED ORAL
Qty: 180 TABLET | Refills: 0 | Status: SHIPPED | OUTPATIENT
Start: 2020-03-27 | End: 2020-05-11

## 2020-04-15 ENCOUNTER — TELEPHONE (OUTPATIENT)
Dept: FAMILY MEDICINE | Facility: CLINIC | Age: 60
End: 2020-04-15

## 2020-04-15 DIAGNOSIS — F41.1 GAD (GENERALIZED ANXIETY DISORDER): ICD-10-CM

## 2020-04-15 RX ORDER — BUSPIRONE HYDROCHLORIDE 15 MG/1
15 TABLET ORAL 2 TIMES DAILY
Qty: 60 TABLET | Refills: 0 | Status: SHIPPED | OUTPATIENT
Start: 2020-04-15 | End: 2020-05-11

## 2020-04-15 NOTE — TELEPHONE ENCOUNTER
"Routing refill request to provider for review/approval because:  Drug not on the [facility] refill protocol     Requested Prescriptions   Pending Prescriptions Disp Refills     busPIRone (BUSPAR) 15 MG tablet [Pharmacy Med Name: BUSPIRONE 15MG] 60 tablet 1     Sig: TAKE 1 TABLET BY MOUTH TWICE DAILY       Atypical Antidepressants Protocol Passed - 4/15/2020  1:16 PM        Passed - Recent (12 mo) or future (30 days) visit within the authorizing provider's specialty     Patient has had an office visit with the authorizing provider or a provider within the authorizing providers department within the previous 12 mos or has a future within next 30 days. See \"Patient Info\" tab in inbasket, or \"Choose Columns\" in Meds & Orders section of the refill encounter.              Passed - Medication active on med list        Passed - Patient is age 18 or older        Passed - No active pregnancy on record        Passed - No positive pregnancy test in past 12 mos             "

## 2020-04-16 NOTE — TELEPHONE ENCOUNTER
Please call patient, due for recheck for further refills, please assist patient in scheduling video visit.  Zeyenp refill given.     Kath Fierro PA-C

## 2020-04-16 NOTE — TELEPHONE ENCOUNTER
Tried to call patient a few times phone is not working so I will close this and wait for patient to call.    Vilma Calix [facility]

## 2020-04-20 DIAGNOSIS — E78.5 HYPERLIPIDEMIA LDL GOAL <100: ICD-10-CM

## 2020-04-21 RX ORDER — SIMVASTATIN 40 MG
TABLET ORAL
Qty: 30 TABLET | Refills: 0 | Status: SHIPPED | OUTPATIENT
Start: 2020-04-21 | End: 2020-05-11

## 2020-04-21 NOTE — TELEPHONE ENCOUNTER
"Prescription approved per [facility] Refill Protocol.  Due for fasting labs 6/10/19.  Requested Prescriptions   Pending Prescriptions Disp Refills     simvastatin (ZOCOR) 40 MG tablet [Pharmacy Med Name: SIMVASTATIN 40MG] 30 tablet 0     Sig: TAKE 1 TABLET BY MOUTH AT BEDTIME       Statins Protocol Passed - 4/20/2020  2:31 PM        Passed - LDL on file in past 12 months     Recent Labs   Lab Test 06/10/19  1013   *             Passed - No abnormal creatine kinase in past 12 months     No lab results found.             Passed - Recent (12 mo) or future (30 days) visit within the authorizing provider's specialty     Patient has had an office visit with the authorizing provider or a provider within the authorizing providers department within the previous 12 mos or has a future within next 30 days. See \"Patient Info\" tab in inbasket, or \"Choose Columns\" in Meds & Orders section of the refill encounter.              Passed - Medication is active on med list        Passed - Patient is age 18 or older        Passed - No active pregnancy on record        Passed - No positive pregnancy test in past 12 months             Last Written Prescription Date:  1/27/2020  Last Fill Quantity: 30,  # refills: 0   Last office visit: 8/19/2019 with prescribing provider:  ESTELA Fierro PA-C   Future Office Visit:      Brandi BRUNNER RN, BSN      "

## 2020-05-06 NOTE — PROGRESS NOTES
"Pily Roche is a 59 year old female who is being evaluated via a billable telephone visit.      The patient has been notified of following:     \"This telephone visit will be conducted via a call between you and your physician/provider. We have found that certain health care needs can be provided without the need for a physical exam.  This service lets us provide the care you need with a short phone conversation.  If a prescription is necessary we can send it directly to your pharmacy.  If lab work is needed we can place an order for that and you can then stop by our lab to have the test done at a later time.    Telephone visits are billed at different rates depending on your insurance coverage. During this emergency period, for some insurers they may be billed the same as an in-person visit.  Please reach out to your insurance provider with any questions.    If during the course of the call the physician/provider feels a telephone visit is not appropriate, you will not be charged for this service.\"    Patient has given verbal consent for Telephone visit?  Yes    What phone number would you like to be contacted at? [phone]    How would you like to obtain your AVS? Mail a copy    Subjective     Pily Roche is a 59 year old female who presents to clinic today for the following health issues:    HPI  Diabetes Follow-up    How often are you checking your blood sugar? A few times a week  What time of day are you checking your blood sugars (select all that apply)?  Before meals  Have you had any blood sugars above 200?  No  Have you had any blood sugars below 70?  No    What symptoms do you notice when your blood sugar is low?  None    What concerns do you have today about your diabetes? None     Do you have any of these symptoms? (Select all that apply)  No numbness or tingling in feet.  No redness, sores or blisters on feet.  No complaints of excessive thirst.  No reports of blurry vision.  No significant " changes to weight.    Have you had a diabetic eye exam in the last 12 months? No- had appt scheduled that         Hyperlipidemia Follow-Up      Are you regularly taking any medication or supplement to lower your cholesterol?   Yes- simvastatin    Are you having muscle aches or other side effects that you think could be caused by your cholesterol lowering medication?  No    Hypertension Follow-up      Do you check your blood pressure regularly outside of the clinic? No     Are you following a low salt diet? Yes    Are your blood pressures ever more than 140 on the top number (systolic) OR more   than 90 on the bottom number (diastolic), for example 140/90? No    BP Readings from Last 2 Encounters:   01/08/20 122/70   09/05/19 127/86     Hemoglobin A1C (%)   Date Value   06/10/2019 7.9 (H)   12/04/2017 6.3 (H)     LDL Cholesterol Calculated (mg/dL)   Date Value   06/10/2019 109 (H)   07/07/2017 91       Depression and Anxiety Follow-Up    How are you doing with your depression since your last visit? Worsened     How are you doing with your anxiety since your last visit?  Worsened     Are you having other symptoms that might be associated with depression or anxiety? No    Have you had a significant life event? OTHER: YES- father passed away in December and is still moving his things     Do you have any concerns with your use of alcohol or other drugs? No     Dad passed away from [facility].  She was living with him and taking care of her Father prior to her father passing.  Depression and anxiety levels are high.  She feels overwhelmed and her thoughts are scattered.     She is not sleeping well.  She has more irritability.  Covid-19 has also added to her stress levels.  She continues to refrain from using alcohol on a regular basis (she states she maybe has 1 drink in a week . . . . Nothing like it was before).     She is working on moving out of her current house and   Sister still is not talking to her (5 years now).   She  has never done counseling for this, she tries to just move on from this.      She is taking all her meds, but has difficulty remembering to take Metformin.    Evening numbers are in the low 100's.  Sometimes if she has a snack, will be closer to 140's.          Social History     Tobacco Use     Smoking status: Current Every Day Smoker     Packs/day: 1.50     Years: 30.00     Pack years: 45.00     Types: Cigarettes     Smokeless tobacco: Never Used     Tobacco comment: 1/2 pack to 1 ppd, has an ecig   Substance Use Topics     Alcohol use: Yes     Alcohol/week: 5.0 - 10.0 standard drinks     Types: 5 - 10 Standard drinks or equivalent per week     Comment: 2beers every few fews     Drug use: Yes     Comment: marijuana     PHQ 12/4/2017 6/10/2019 5/11/2020   PHQ-9 Total Score 12 14 14   Q9: Thoughts of better off dead/self-harm past 2 weeks Several days Several days Not at all     GUDELIA-7 SCORE 12/4/2017 6/10/2019 5/11/2020   Total Score - - -   Total Score 11 10 20     Last PHQ-9 5/11/2020   1.  Little interest or pleasure in doing things 1   2.  Feeling down, depressed, or hopeless 2   3.  Trouble falling or staying asleep, or sleeping too much 3   4.  Feeling tired or having little energy 2   5.  Poor appetite or overeating 1   6.  Feeling bad about yourself 2   7.  Trouble concentrating 2   8.  Moving slowly or restless 1   Q9: Thoughts of better off dead/self-harm past 2 weeks 0   PHQ-9 Total Score 14   Difficulty at work, home, or with people Somewhat difficult     GUDELIA-7  5/11/2020   1. Feeling nervous, anxious, or on edge 3   2. Not being able to stop or control worrying 3   3. Worrying too much about different things 3   4. Trouble relaxing 2   5. Being so restless that it is hard to sit still 3   6. Becoming easily annoyed or irritable 3   7. Feeling afraid, as if something awful might happen 3   GUDELIA-7 Total Score 20   If you checked any problems, how difficult have they made it for you to do your work, take  care of things at home, or get along with other people? Very difficult         Suicide Assessment Five-step Evaluation and Treatment (SAFE-T)    COPD Follow-Up    Overall, how are your COPD symptoms since your last clinic visit?  No change    How much fatigue or shortness of breath do you have when you are walking?  None    How much shortness of breath do you have when you are resting?  None    How often do you cough? Sometimes    Have you noticed any change in your sputum/phlegm?  No    Have you experienced a recent fever? No    Please describe how far you can walk without stopping to rest:  Less than a mile    How many flights of stairs are you able to walk up without stopping?  None    Have you had any Emergency Room Visits, Urgent Care Visits, or Hospital Admissions because of your COPD since your last office visit?  No    History   Smoking Status     Current Every Day Smoker     Packs/day: 1.50     Years: 30.00     Types: Cigarettes   Smokeless Tobacco     Never Used     Comment: 1/2 pack to 1 ppd, has an ecig     Lab Results   Component Value Date    FEV1 2.09@ 05/21/2008     *Requesting copy of MRI of back and images    How many servings of fruits and vegetables do you eat daily?  0-1    On average, how many sweetened beverages do you drink each day (Examples: soda, juice, sweet tea, etc.  Do NOT count diet or artificially sweetened beverages)?   2    How many days per week do you exercise enough to make your heart beat faster? 3 or less    How many minutes a day do you exercise enough to make your heart beat faster? 9 or less  How many days per week do you miss taking your medication? 1    What makes it hard for you to take your medications?  remembering to take      Dnaia Bellamy CMA        Patient Active Problem List   Diagnosis     Insomnia     Chronic low back pain     Elevated liver enzymes     Moderate major depression (H)     Disorder of bursae and tendons in shoulder region     Chronic bronchitis with  COPD (chronic obstructive pulmonary disease) (H)     Advance care planning     Type 2 diabetes, HbA1c goal < 7% (H)     Hyperlipidemia LDL goal <100     Impingement syndrome, shoulder     Rotator cuff tear     AC separation     Tobacco use disorder     Anxiety     Chronic shoulder pain, unspecified laterality     CKD (chronic kidney disease) stage 2, GFR 60-89 ml/min     Macular degeneration     Type II diabetes mellitus with stage 2 chronic kidney disease (H)     Non morbid obesity due to excess calories     Menopausal symptoms     Chronic bilateral low back pain without sciatica     Chronic pain syndrome     Methamphetamine use (H)     Type 2 diabetes mellitus without retinopathy (H)     H/O alcohol abuse     GUDELIA (generalized anxiety disorder)     Elevated blood pressure reading without diagnosis of hypertension     Ingrown toenail without infection     Past Surgical History:   Procedure Laterality Date     C SHOULDER SURG PROC UNLISTED  1992    X 3 (broken clavicle and rotator cuff tear with impingement     HC SACROPLASTY  1990's    Herniated L4-L5 X 2     HYSTERECTOMY, PAP NO LONGER INDICATED  2005     HYSTERECTOMY, VAGINAL  6/28/05    Ovaries in Place       Social History     Tobacco Use     Smoking status: Current Every Day Smoker     Packs/day: 1.50     Years: 30.00     Pack years: 45.00     Types: Cigarettes     Smokeless tobacco: Never Used     Tobacco comment: 1/2 pack to 1 ppd, has an ecig   Substance Use Topics     Alcohol use: Yes     Alcohol/week: 5.0 - 10.0 standard drinks     Types: 5 - 10 Standard drinks or equivalent per week     Comment: 2beers every few fews     Family History   Problem Relation Age of Onset     Diabetes Mother      Hypertension Mother      Gynecology Mother      Arthritis Mother      Thyroid Disease Mother      Allergies Mother      Lipids Father      Heart Disease Father      C.A.D. Father      Hypertension Maternal Grandmother      Arthritis Maternal Grandmother      Cancer  Maternal Grandmother      Cancer Maternal Grandfather      C.APITER. Maternal Grandfather      Asthma Paternal Grandmother      C.APITER. Paternal Grandmother      Cerebrovascular Disease Paternal Grandfather      Alzheimer Disease Paternal Grandfather      Arthritis Paternal Grandfather      C.A.D. Paternal Grandfather      Cardiovascular Paternal Grandfather      Circulatory Paternal Grandfather      Glaucoma No family hx of      Macular Degeneration No family hx of          Current Outpatient Medications   Medication Sig Dispense Refill     albuterol (VENTOLIN HFA) 108 (90 Base) MCG/ACT inhaler INHALE 2 PUFFS EVERY 6 HOURS AS NEEDED FOR SHORTNESS OF BREATH OR DYSPNEA 36 g 1     alcohol swab prep pads Use to swab area of injection/mike as directed 100 each 3     aspirin 81 MG tablet Take 1 tablet (81 mg) by mouth At Bedtime 90 tablet 3     blood glucose (NO BRAND SPECIFIED) test strip Use to test blood sugar one time daily or as directed. To accompany: Blood Glucose Monitor Brands: per insurance. 100 strip 6     blood glucose monitoring (NO BRAND SPECIFIED) meter device kit Use to test blood sugar one time daily or as directed. 1 kit 0     buPROPion (WELLBUTRIN SR) 150 MG 12 hr tablet Take 1 tablet (150 mg) by mouth 2 times daily 180 tablet 0     busPIRone (BUSPAR) 10 MG tablet Take 2 tablets (20 mg) by mouth 2 times daily 360 tablet 0     diclofenac (VOLTAREN) 75 MG EC tablet TAKE 1 TABLET BY MOUTH TWICE DAILY AS NEEDED FOR MODERATE PAIN. 180 tablet 0     fluticasone (FLONASE) 50 MCG/ACT nasal spray Spray 2 sprays into both nostrils daily 48 g 1     gabapentin (NEURONTIN) 300 MG capsule TAKE 3 CAPSULES BY MOUTH THREE TIMES DAILY 810 capsule 0     lisinopril (ZESTRIL) 10 MG tablet Take 1 tablet (10 mg) by mouth daily 90 tablet 0     loratadine (CLARITIN) 10 MG tablet Take 1 tablet (10 mg) by mouth daily 90 tablet 0     metFORMIN (GLUCOPHAGE-XR) 500 MG 24 hr tablet Take 4 tablets (2,000 mg) by mouth daily (with  breakfast) 360 tablet 0     methocarbamol (ROBAXIN) 500 MG tablet TAKE 2 TABLETS BY MOUTH THREE TIMES DAILY AS NEEDED MUSCLE SPASMS 180 tablet 0     omeprazole (PRILOSEC) 20 MG DR capsule Take 1 capsule (20 mg) by mouth daily as needed (GERD) 90 capsule 0     order for DME Diabetic test strips and lancets per insurance formulary Check blood sugar once per day 3 Month 1     order for DME 1 glucometer per insurance formulary 1 Device 0     simvastatin (ZOCOR) 40 MG tablet Take 1 tablet (40 mg) by mouth At Bedtime 90 tablet 0     traZODone (DESYREL) 100 MG tablet TAKE 2 TABLETS BY MOUTH AT BEDTIME 180 tablet 0     BP Readings from Last 3 Encounters:   01/08/20 122/70   09/05/19 127/86   08/19/19 124/82    Wt Readings from Last 3 Encounters:   01/08/20 87.1 kg (192 lb)   09/05/19 86.4 kg (190 lb 6.4 oz)   08/19/19 86.5 kg (190 lb 9.6 oz)                    Reviewed and updated as needed this visit by Provider         Review of Systems   Constitutional, HEENT, cardiovascular, pulmonary, GI, , musculoskeletal, neuro, skin, endocrine and psych systems are negative, except as otherwise noted.       Objective   Reported vitals:  There were no vitals taken for this visit.   healthy, alert and no distress  PSYCH: Alert and oriented times 3; coherent speech, normal   rate and volume, able to articulate logical thoughts, able   to abstract reason, no tangential thoughts, no hallucinations   or delusions  Her affect is normal  RESP: No cough, no audible wheezing, able to talk in full sentences  Remainder of exam unable to be completed due to telephone visits    Diagnostic Test Results:  Labs reviewed in Epic        Assessment/Plan:  1. Type 2 diabetes mellitus with stage 2 chronic kidney disease, without long-term current use of insulin (H)  Due to recheck A1C.  Home glucose numbers seem to be controlled.  She is having difficulty with taking metformin consistently with dinner, she has tolerated it well thus far without any  side effects, therefore I suggest start taking metformin in the morning with breakfast (instead of in the evening).       Start lisinopril, which is a medication for people with diabetes to help protect their kidney's. It's also a blood pressure medication, however your blood pressures have always been nicely controlled.          Schedule a diabetic eye exam soon as well.    - metFORMIN (GLUCOPHAGE-XR) 500 MG 24 hr tablet; Take 4 tablets (2,000 mg) by mouth daily (with breakfast)  Dispense: 360 tablet; Refill: 0  - lisinopril (ZESTRIL) 10 MG tablet; Take 1 tablet (10 mg) by mouth daily  Dispense: 90 tablet; Refill: 0  - Albumin Random Urine Quantitative with Creat Ratio; Future  - Lipid panel reflex to direct LDL Fasting; Future  - Comprehensive metabolic panel; Future  - Hemoglobin A1c; Future    2. GUDELIA (generalized anxiety disorder)  Let's increase your Buspar from 15 mg twice per day to 20 mg twice per day (I have sent 10 mg tabs to your pharmacy).   - busPIRone (BUSPAR) 10 MG tablet; Take 2 tablets (20 mg) by mouth 2 times daily  Dispense: 360 tablet; Refill: 0    3. Major depressive disorder, recurrent episode, moderate (H)  I recommend counseling, however she declines.  She prefers to deal with things on her own.  Will leave meds as is  - traZODone (DESYREL) 100 MG tablet; TAKE 2 TABLETS BY MOUTH AT BEDTIME  Dispense: 180 tablet; Refill: 0  - buPROPion (WELLBUTRIN SR) 150 MG 12 hr tablet; Take 1 tablet (150 mg) by mouth 2 times daily  Dispense: 180 tablet; Refill: 0    4. Hyperlipidemia LDL goal <100  Tolerating statin without issues  - simvastatin (ZOCOR) 40 MG tablet; Take 1 tablet (40 mg) by mouth At Bedtime  Dispense: 90 tablet; Refill: 0    5. Chronic shoulder pain, unspecified laterality  Chronic.   - diclofenac (VOLTAREN) 75 MG EC tablet; TAKE 1 TABLET BY MOUTH TWICE DAILY AS NEEDED FOR MODERATE PAIN.  Dispense: 180 tablet; Refill: 0  - gabapentin (NEURONTIN) 300 MG capsule; TAKE 3 CAPSULES BY MOUTH  THREE TIMES DAILY  Dispense: 810 capsule; Refill: 0  - methocarbamol (ROBAXIN) 500 MG tablet; TAKE 2 TABLETS BY MOUTH THREE TIMES DAILY AS NEEDED MUSCLE SPASMS  Dispense: 180 tablet; Refill: 0    6. Tobacco use disorder  Tolerating wellbutrin.   - buPROPion (WELLBUTRIN SR) 150 MG 12 hr tablet; Take 1 tablet (150 mg) by mouth 2 times daily  Dispense: 180 tablet; Refill: 0    7. LPRD (laryngopharyngeal reflux disease)  Stable.   - omeprazole (PRILOSEC) 20 MG DR capsule; Take 1 capsule (20 mg) by mouth daily as needed (GERD)  Dispense: 90 capsule; Refill: 0    Chronic bronchitis with COPD:  She states she no longer uses dulera and the albuterol PRN works well.  She does not need this on a daily basis.     Schedule a mammo soon.   Return in about 1 week (around 5/18/2020) for fasting labs.      Phone call duration:  27 minutes    Kath Fierro PA-C

## 2020-05-08 NOTE — PATIENT INSTRUCTIONS
HPI     No visual complaints.   Eyes burns and sting sometimes in the morning.  Last eye exam 03/19/2019 TRF.   Update glasses RX.  No visual compalints     Cataract, nuclear sclerotic senile, bilateral  Dry eyes, bilateral    Last edited by Alejandro Goins, OD on 5/8/2020  9:49 AM. (History)            Assessment /Plan     For exam results, see Encounter Report.    Cataract, nuclear sclerotic senile, bilateral    Dry eyes, bilateral    Hyperopia, bilateral    Bilateral presbyopia      Mild cataracts OU, not surgical.    Worksheet given. Discussed Dry Eyes in detail including Artificial Tears, lubricants, and Omega 3 Fish Oils.    Dispense Final Rx for glasses.  RTC 1 year  Discussed above and answered questions.                    SMOKING CESSATION  What's in cigarette smoke? - Cigarette smoke contains over 4,000 chemicals. Nicotine is one of the main ingredients which is an insecticide/herbicide. It is poisonous to our nervous system, increases blood clotting risk, and decreases the body's defenses to fight off infection. Another chemical is Carbon Monoxide is an asphyxiating gas that permanently binds to blood cells and blocks the transport of oxygen. This leads to tissue death and decreases your metabolism. Tar is a chemical that coats your lungs and trachea which impairs new oxygen coming in and carbon dioxide getting out of your body.   How does smoking impact surgery? - Smoking is particularly hazardous with regards to surgery. Surgery puts stress on the body and a smoker's body is already under strain from these chemicals. Putting the two together, especially for an elective surgery, could be a recipe for disaster. Smoking before and after surgery increases your risk of heart problems, slow wound healing, delayed bone healing, blood clots, wound infection and anesthesia complications.   What are the benefits of quitting? - In 20 minutes your blood pressure will drop back down to normal. In 8 hours the carbon monoxide (a toxic gas) levels in your blood stream will drop by half, and oxygen levels will return to normal. In 48 hours your chance of having a heart attack will have decreased. All nicotine will have left your body. Your sense of taste and smell will return to a normal level. In 72 hours your bronchial tubes will relax, and your energy levels will increase. In 2 weeks your circulation will increase, and it will continue to improve for the next 10 weeks.    Recommendations for elective surgery - Ideally, patients should quit smoking 8 weeks before and at least 2 weeks after elective surgery in order to avoid complications. Simply cutting back on the amount of cigarettes smoked per day does not offer any benefit or decrease the  risk of poor wound healing, heart problems, and infection. Smokers should also start taking Vitamin C and B for two weeks before surgery and two weeks after surgery.    Ways to Stop Smokin. Nicotine patches, lozenges, or gum  2. Support Groups  3. Medications (see below)    List of Medications:  1. Varenicline Tartrate (CHANTIX)   2. Bupropion HCL (WELLBUTRIN, ZYBAN) - note: make sure Wellbutrin is for smoking cessation and not other issues   3. Nicotine Patch (NICODERM)   4. Nicotine Inhaler (NICOTROL)   5. Nicotine Gum Nicotine Polacrilex   6. Nicotine Lozenge: Nicotine Polacrilex (COMMIT)   * [place] offers a smoking support group as well!  Please visit: [email or web address]  If you are interested in these, ask about getting a prescription or talk to your primary care doctor about what may be the best way for you to quit.       Weight management plan: Patient was referred to their PCP to discuss a diet and exercise plan.     We wish you continued good healing. If you have any questions or concerns, please do not hesitate to contact us at [phone]    Please remember to call and schedule a follow up appointment if one was recommended at your earliest convenience.   PODIATRY CLINIC HOURS  TELEPHONE NUMBER    Dr. Cain Epstein [email or web address].[email or web address] [facility]    Clinics:  [facility]    Dottie Jade CMA   Tuesday 1PM-6PM  [place]  Wednesday 7AM-2PM  [facility]  Thursday 10AM-6PM  [place]  Friday 7AM-3PM  [place]  Specialty schedulers:   [phone] to make an appointment with any Specialty Provider.        Urgent Care locations:    [facility] Monday-Friday 5 pm - 9 pm. Saturday- 9 am -5pm    Monday-Friday 11 am - 9 pm Saturday 9 am - 5 pm     Monday- 12 noon-8PM [phone]     If you need a medication refill, please contact us you may need  lab work and/or a follow up visit prior to your refill (i.e. Antifungal medications).    [facility] (secure e-mail communication and access to your chart) to send a message or to make an appointment.    If MRI needed please call [facility] at [phone]

## 2020-05-11 ENCOUNTER — VIRTUAL VISIT (OUTPATIENT)
Dept: FAMILY MEDICINE | Facility: CLINIC | Age: 60
End: 2020-05-11
Payer: COMMERCIAL

## 2020-05-11 DIAGNOSIS — F17.200 TOBACCO USE DISORDER: ICD-10-CM

## 2020-05-11 DIAGNOSIS — K21.9 LPRD (LARYNGOPHARYNGEAL REFLUX DISEASE): ICD-10-CM

## 2020-05-11 DIAGNOSIS — J44.89 CHRONIC BRONCHITIS WITH COPD (CHRONIC OBSTRUCTIVE PULMONARY DISEASE) (H): ICD-10-CM

## 2020-05-11 DIAGNOSIS — M25.519 CHRONIC SHOULDER PAIN, UNSPECIFIED LATERALITY: ICD-10-CM

## 2020-05-11 DIAGNOSIS — N18.2 TYPE 2 DIABETES MELLITUS WITH STAGE 2 CHRONIC KIDNEY DISEASE, WITHOUT LONG-TERM CURRENT USE OF INSULIN (H): ICD-10-CM

## 2020-05-11 DIAGNOSIS — E11.22 TYPE 2 DIABETES MELLITUS WITH STAGE 2 CHRONIC KIDNEY DISEASE, WITHOUT LONG-TERM CURRENT USE OF INSULIN (H): ICD-10-CM

## 2020-05-11 DIAGNOSIS — F41.1 GAD (GENERALIZED ANXIETY DISORDER): ICD-10-CM

## 2020-05-11 DIAGNOSIS — G89.29 CHRONIC SHOULDER PAIN, UNSPECIFIED LATERALITY: ICD-10-CM

## 2020-05-11 DIAGNOSIS — E78.5 HYPERLIPIDEMIA LDL GOAL <100: ICD-10-CM

## 2020-05-11 DIAGNOSIS — F33.1 MAJOR DEPRESSIVE DISORDER, RECURRENT EPISODE, MODERATE (H): ICD-10-CM

## 2020-05-11 PROCEDURE — 96127 BRIEF EMOTIONAL/BEHAV ASSMT: CPT | Performed by: PHYSICIAN ASSISTANT

## 2020-05-11 PROCEDURE — 99214 OFFICE O/P EST MOD 30 MIN: CPT | Mod: 95 | Performed by: PHYSICIAN ASSISTANT

## 2020-05-11 RX ORDER — SIMVASTATIN 40 MG
40 TABLET ORAL AT BEDTIME
Qty: 90 TABLET | Refills: 0 | Status: SHIPPED | OUTPATIENT
Start: 2020-05-11 | End: 2020-09-10

## 2020-05-11 RX ORDER — METHOCARBAMOL 500 MG/1
TABLET, FILM COATED ORAL
Qty: 180 TABLET | Refills: 0 | Status: SHIPPED | OUTPATIENT
Start: 2020-05-11 | End: 2020-08-10

## 2020-05-11 RX ORDER — LISINOPRIL 10 MG/1
10 TABLET ORAL DAILY
Qty: 90 TABLET | Refills: 0 | Status: SHIPPED | OUTPATIENT
Start: 2020-05-11 | End: 2020-09-10

## 2020-05-11 RX ORDER — METFORMIN HCL 500 MG
2000 TABLET, EXTENDED RELEASE 24 HR ORAL
Qty: 360 TABLET | Refills: 0 | Status: SHIPPED | OUTPATIENT
Start: 2020-05-11 | End: 2020-09-30

## 2020-05-11 RX ORDER — BUSPIRONE HYDROCHLORIDE 10 MG/1
20 TABLET ORAL 2 TIMES DAILY
Qty: 360 TABLET | Refills: 0 | Status: SHIPPED | OUTPATIENT
Start: 2020-05-11 | End: 2020-09-30

## 2020-05-11 RX ORDER — GABAPENTIN 300 MG/1
CAPSULE ORAL
Qty: 810 CAPSULE | Refills: 0 | Status: SHIPPED | OUTPATIENT
Start: 2020-05-11 | End: 2020-09-30

## 2020-05-11 RX ORDER — BUPROPION HYDROCHLORIDE 150 MG/1
150 TABLET, EXTENDED RELEASE ORAL 2 TIMES DAILY
Qty: 180 TABLET | Refills: 0 | Status: SHIPPED | OUTPATIENT
Start: 2020-05-11 | End: 2020-09-30

## 2020-05-11 RX ORDER — TRAZODONE HYDROCHLORIDE 100 MG/1
TABLET ORAL
Qty: 180 TABLET | Refills: 0 | Status: SHIPPED | OUTPATIENT
Start: 2020-05-11 | End: 2020-09-30

## 2020-05-11 RX ORDER — DICLOFENAC SODIUM 75 MG/1
TABLET, DELAYED RELEASE ORAL
Qty: 180 TABLET | Refills: 0 | Status: SHIPPED | OUTPATIENT
Start: 2020-05-11 | End: 2020-09-30

## 2020-05-11 ASSESSMENT — ANXIETY QUESTIONNAIRES
7. FEELING AFRAID AS IF SOMETHING AWFUL MIGHT HAPPEN: NEARLY EVERY DAY
3. WORRYING TOO MUCH ABOUT DIFFERENT THINGS: NEARLY EVERY DAY
2. NOT BEING ABLE TO STOP OR CONTROL WORRYING: NEARLY EVERY DAY
IF YOU CHECKED OFF ANY PROBLEMS ON THIS QUESTIONNAIRE, HOW DIFFICULT HAVE THESE PROBLEMS MADE IT FOR YOU TO DO YOUR WORK, TAKE CARE OF THINGS AT HOME, OR GET ALONG WITH OTHER PEOPLE: VERY DIFFICULT
5. BEING SO RESTLESS THAT IT IS HARD TO SIT STILL: NEARLY EVERY DAY
1. FEELING NERVOUS, ANXIOUS, OR ON EDGE: NEARLY EVERY DAY
GAD7 TOTAL SCORE: 20
6. BECOMING EASILY ANNOYED OR IRRITABLE: NEARLY EVERY DAY

## 2020-05-11 ASSESSMENT — PATIENT HEALTH QUESTIONNAIRE - PHQ9
5. POOR APPETITE OR OVEREATING: MORE THAN HALF THE DAYS
SUM OF ALL RESPONSES TO PHQ QUESTIONS 1-9: 14

## 2020-05-11 NOTE — PATIENT INSTRUCTIONS
Schedule a fasting lab only appointment to recheck blood tests.      Start taking metformin in the morning with breakfast (instead of in the evening).     Start lisinopril, which is a medication for people with diabetes to help protect their kidney's. It's also a blood pressure medication, however your blood pressures have always been nicely controlled.      Let's increase your Buspar from 15 mg twice per day to 20 mg twice per day (I have sent 10 mg tabs to your pharmacy).     Schedule a mammo soon.     Schedule a diabetic eye exam soon as well.

## 2020-05-12 ASSESSMENT — ANXIETY QUESTIONNAIRES: GAD7 TOTAL SCORE: 20

## 2020-06-04 DIAGNOSIS — K21.9 LPRD (LARYNGOPHARYNGEAL REFLUX DISEASE): ICD-10-CM

## 2020-06-05 RX ORDER — LORATADINE 10 MG/1
TABLET ORAL
Qty: 90 TABLET | Refills: 0 | Status: SHIPPED | OUTPATIENT
Start: 2020-06-05 | End: 2021-03-19

## 2020-06-05 NOTE — TELEPHONE ENCOUNTER
"Prescription approved per [facility] Refill Protocol.    Requested Prescriptions   Pending Prescriptions Disp Refills     ALLERGY RELIEF 10 MG tablet [Pharmacy Med Name: ALLERGY RELF 10MG] 90 tablet 0     Sig: TAKE 1 TABLET (10 MG) BY MOUTH DAILY       Antihistamines Protocol Passed - 6/4/2020  9:33 AM        Passed - Patient is 3-64 years of age     Apply weight-based dosing for peds patients age 3 - 12 years of age.    Forward request to provider for patients under the age of 3 or over the age of 64.          Passed - Recent (12 mo) or future (30 days) visit within the authorizing provider's specialty     Patient has had an office visit with the authorizing provider or a provider within the authorizing providers department within the previous 12 mos or has a future within next 30 days. See \"Patient Info\" tab in inbasket, or \"Choose Columns\" in Meds & Orders section of the refill encounter.              Passed - Medication is active on med list         Vilma Ordonez RN    "

## 2020-06-25 ENCOUNTER — TELEPHONE (OUTPATIENT)
Dept: NEUROSURGERY | Facility: CLINIC | Age: 60
End: 2020-06-25

## 2020-06-25 NOTE — TELEPHONE ENCOUNTER
Patient called and LM on RN line that [facility] never received injection order and needs to be refaxed.     Faxed injection order to [facility]  June 25, 2020 to fax number [phone]    Right Fax confirmed at 1337 PM    Marimar Galvan, RN    Called and spoke to patient to inform her that order was re faxed over.

## 2020-08-10 DIAGNOSIS — G89.29 CHRONIC SHOULDER PAIN, UNSPECIFIED LATERALITY: ICD-10-CM

## 2020-08-10 DIAGNOSIS — M25.519 CHRONIC SHOULDER PAIN, UNSPECIFIED LATERALITY: ICD-10-CM

## 2020-08-10 RX ORDER — METHOCARBAMOL 500 MG/1
TABLET, FILM COATED ORAL
Qty: 180 TABLET | Refills: 0 | Status: SHIPPED | OUTPATIENT
Start: 2020-08-10 | End: 2020-09-30

## 2020-08-10 NOTE — TELEPHONE ENCOUNTER
Routing refill request to provider for review/approval because:  Drug not on the [facility] refill protocol     Brandi BRUNNER RN, BSN

## 2020-08-11 ENCOUNTER — OFFICE VISIT (OUTPATIENT)
Dept: OPTOMETRY | Facility: CLINIC | Age: 60
End: 2020-08-11
Payer: COMMERCIAL

## 2020-08-11 DIAGNOSIS — E11.9 TYPE 2 DIABETES, HBA1C GOAL < 7% (H): Primary | ICD-10-CM

## 2020-08-11 DIAGNOSIS — H52.4 PRESBYOPIA: ICD-10-CM

## 2020-08-11 DIAGNOSIS — H04.123 DRY EYE SYNDROME OF BOTH EYES: ICD-10-CM

## 2020-08-11 DIAGNOSIS — H35.3131 EARLY DRY STAGE NONEXUDATIVE AGE-RELATED MACULAR DEGENERATION OF BOTH EYES: ICD-10-CM

## 2020-08-11 PROCEDURE — 92014 COMPRE OPH EXAM EST PT 1/>: CPT | Performed by: OPTOMETRIST

## 2020-08-11 PROCEDURE — 92015 DETERMINE REFRACTIVE STATE: CPT | Performed by: OPTOMETRIST

## 2020-08-11 RX ORDER — POLYETHYLENE GLYCOL 400 AND PROPYLENE GLYCOL 4; 3 MG/ML; MG/ML
1 SOLUTION/ DROPS OPHTHALMIC 4 TIMES DAILY
Qty: 6 ML | Refills: 12 | Status: SHIPPED | OUTPATIENT
Start: 2020-08-11 | End: 2021-08-11

## 2020-08-11 ASSESSMENT — REFRACTION_WEARINGRX
OD_ADD: +2.00
OS_CYLINDER: +0.75
OS_AXIS: 120
OD_SPHERE: PLANO
OD_CYLINDER: +0.50
OD_AXIS: 035
OS_SPHERE: PLANO
OD_CYLINDER: +0.50
OD_ADD: +2.00
OS_SPHERE: -0.25
OD_AXIS: 035
OS_CYLINDER: +0.75
SPECS_TYPE: BIFOCAL
OD_SPHERE: PLANO
OS_ADD: +2.00
OS_AXIS: 120
OS_ADD: +2.00

## 2020-08-11 ASSESSMENT — VISUAL ACUITY
OD_SC+: -1
CORRECTION_TYPE: GLASSES
OS_CC: 20/25
OD_CC+: -1
OD_SC: 20/25
OS_CC: 20/30-2
OS_SC+: -3
OD_CC: 20/30-3
OD_CC: 20/20
OS_SC: 20/20
METHOD: SNELLEN - LINEAR
OS_CC+: -2

## 2020-08-11 ASSESSMENT — TONOMETRY
IOP_METHOD: TONOPEN
OS_IOP_MMHG: 17
OD_IOP_MMHG: 15

## 2020-08-11 ASSESSMENT — CUP TO DISC RATIO
OD_RATIO: 0.2
OS_RATIO: 0.2

## 2020-08-11 ASSESSMENT — EXTERNAL EXAM - RIGHT EYE: OD_EXAM: NORMAL

## 2020-08-11 ASSESSMENT — REFRACTION_MANIFEST
OS_ADD: +2.00
OD_SPHERE: +0.50
OS_SPHERE: +0.50
OD_ADD: +2.00

## 2020-08-11 ASSESSMENT — SLIT LAMP EXAM - LIDS
COMMENTS: NORMAL
COMMENTS: NORMAL

## 2020-08-11 ASSESSMENT — CONF VISUAL FIELD
OD_NORMAL: 1
OS_NORMAL: 1

## 2020-08-11 ASSESSMENT — EXTERNAL EXAM - LEFT EYE: OS_EXAM: NORMAL

## 2020-08-11 NOTE — LETTER
8/11/2020         RE: Pily Roche  [address]  [place] [address]        Dear Colleague,    Thank you for referring your patient, Pily Roche, to the [facility]. Please see a copy of my visit note below.    Chief Complaint   Patient presents with     Diabetic Eye Exam        Hemoglobin A1C   Date Value Ref Range Status   06/10/2019 7.9 (H) 0 - 5.6 % Final     Comment:     Normal <5.7% Prediabetes 5.7-6.4%  Diabetes 6.5% or higher - adopted from ADA   consensus guidelines.     12/04/2017 6.3 (H) 4.3 - 6.0 % Final   07/07/2017 6.3 (H) 4.3 - 6.0 % Final         Last Eye Exam: 9-  Dilated Previously: Yes    What are you currently using to see?  glasses    Distance Vision Acuity: Satisfied with vision    Near Vision Acuity: Not satisfied     Eye Comfort: good  Do you use eye drops? : Yes: systane sometimes   Occupation or Hobbies: none    Aleksandra Quinn Optometric Assistant, A.B.O.C.     Medical, surgical and family histories reviewed and updated 8/11/2020.       OBJECTIVE: See Ophthalmology exam    ASSESSMENT:    ICD-10-CM    1. Type 2 diabetes, HbA1c goal < 7% (H)  E11.9 EYE EXAM (SIMPLE-NONBILLABLE)   2. Dry eye syndrome of both eyes  H04.123 EYE EXAM (SIMPLE-NONBILLABLE)     polyethylene glycol-propylene glycol (SYSTANE ULTRA) 0.4-0.3 % SOLN ophthalmic solution   3. Early dry stage nonexudative age-related macular degeneration of both eyes  H35.3131    4. Presbyopia  H52.4 REFRACTION      PLAN:    Pily Roche aware  eye exam results will be sent to Kath Fierro.  Patient Instructions   There are not any signs of the diabetes affecting the eyes today.  It is important that you get your eyes dilated once yearly and keep good control of your diabetes.    Systane Ultra 1 drop both eyes 2-4 x day.    Monitor the macular degeneration with yearly eye exams.   I recommend you quit smoking.  You should see your PCP for smoking cessation options.    Eyeglass  prescription given.    Return in 1 year for a complete eye exam or sooner if needed.    Efrain Garcia, OD                  Again, thank you for allowing me to participate in the care of your patient.        Sincerely,        Efrain Garcia, OD

## 2020-08-11 NOTE — PROGRESS NOTES
Chief Complaint   Patient presents with     Diabetic Eye Exam        Hemoglobin A1C   Date Value Ref Range Status   06/10/2019 7.9 (H) 0 - 5.6 % Final     Comment:     Normal <5.7% Prediabetes 5.7-6.4%  Diabetes 6.5% or higher - adopted from ADA   consensus guidelines.     12/04/2017 6.3 (H) 4.3 - 6.0 % Final   07/07/2017 6.3 (H) 4.3 - 6.0 % Final         Last Eye Exam: 9-  Dilated Previously: Yes    What are you currently using to see?  glasses    Distance Vision Acuity: Satisfied with vision    Near Vision Acuity: Not satisfied     Eye Comfort: good  Do you use eye drops? : Yes: systane sometimes   Occupation or Hobbies: none    Aleksandra Quinn Optometric Assistant, A.B.O.C.     Medical, surgical and family histories reviewed and updated 8/11/2020.       OBJECTIVE: See Ophthalmology exam    ASSESSMENT:    ICD-10-CM    1. Type 2 diabetes, HbA1c goal < 7% (H)  E11.9 EYE EXAM (SIMPLE-NONBILLABLE)   2. Dry eye syndrome of both eyes  H04.123 EYE EXAM (SIMPLE-NONBILLABLE)     polyethylene glycol-propylene glycol (SYSTANE ULTRA) 0.4-0.3 % SOLN ophthalmic solution   3. Early dry stage nonexudative age-related macular degeneration of both eyes  H35.3131    4. Presbyopia  H52.4 REFRACTION      PLAN:    Pliy Roche aware  eye exam results will be sent to Kath Fierro.  Patient Instructions   There are not any signs of the diabetes affecting the eyes today.  It is important that you get your eyes dilated once yearly and keep good control of your diabetes.    Systane Ultra 1 drop both eyes 2-4 x day.    Monitor the macular degeneration with yearly eye exams.   I recommend you quit smoking.  You should see your PCP for smoking cessation options.    Eyeglass prescription given.    Return in 1 year for a complete eye exam or sooner if needed.    Efrain Garcia, CLAUDIA

## 2020-08-11 NOTE — PATIENT INSTRUCTIONS
There are not any signs of the diabetes affecting the eyes today.  It is important that you get your eyes dilated once yearly and keep good control of your diabetes.    Systane Ultra 1 drop both eyes 2-4 x day.    Monitor the macular degeneration with yearly eye exams.   I recommend you quit smoking.  You should see your PCP for smoking cessation options.    Eyeglass prescription given.    Return in 1 year for a complete eye exam or sooner if needed.    Efrain Garcia, OD    The affects of the dilating drops last for 4- 6 hours.  You will be more sensitive to light and vision will be blurry up close.  Mydriatic sunglasses were given if needed.    Patient Education   Diabetes weakens the blood vessels all over the body, including the eyes. Damage to the blood vessels in the eyes can cause swelling or bleeding into part of the eye (called the retina). This is called diabetic retinopathy (ALVIN-tin-AH-puh-thee). If not treated, this disease can cause vision loss or blindness.   Symptoms may include blurred or distorted vision, but many people have no symptoms. It's important to see your eye doctor regularly to check for problems.   Early treatment and good control can help protect your vision. Here are the things you can do to help prevent vision loss:      1. Keep your blood sugar levels under tight control.      2. Bring high blood pressure under control.      3. No smoking.      4. Have yearly dilated eye exams.         Optometry Providers       Clinic Locations                                 Telephone Number   Dr. Anny Young  [place] [phone]     [facility] Hours:                [facility] Hours:       [facility] Hours:   [address]   [address] [address]     [address] [place] [address]   [place] [address]    [place] [address]  Phone: [phone]                    Phone:  [phone]     Phone: [phone]                      Monday 8:00-7:00                          Monday 8:00-7:00                          Monday 8:00-7:00              Tuesday 8:00-6:00                          Tuesday 8:00-7:00                          Tuesday 8:00-7:00              Wednesday 8:00-6:00                  Wednesday 8:00-7:00                   Wednesday 8:00-7:00      Thursday 8:00-6:00                        Thursday 8:00-7:00                         Thursday 8:00-7:00            Friday 8:00-5:00                              Friday 8:00-5:00                              Friday 8:00-5:00    [facility] Hours:   [address] [place] [address]  [phone]    Monday 8:00-7:00  Tuesday 8:00-7:00  Wednesday 8:00-7:00  Thursday 8:00-7:00  Friday 8:00-5:00  Please log on to [email or web address] to order your contact lenses.  The link is found on the [facility] page.  As always, Thank you for trusting us with your health care needs!

## 2020-08-25 ENCOUNTER — APPOINTMENT (OUTPATIENT)
Dept: OPTOMETRY | Facility: CLINIC | Age: 60
End: 2020-08-25
Payer: COMMERCIAL

## 2020-08-25 PROCEDURE — 92341 FIT SPECTACLES BIFOCAL: CPT | Performed by: OPTOMETRIST

## 2020-09-03 DIAGNOSIS — E78.5 HYPERLIPIDEMIA LDL GOAL <100: ICD-10-CM

## 2020-09-03 DIAGNOSIS — N18.2 TYPE 2 DIABETES MELLITUS WITH STAGE 2 CHRONIC KIDNEY DISEASE, WITHOUT LONG-TERM CURRENT USE OF INSULIN (H): ICD-10-CM

## 2020-09-03 DIAGNOSIS — E11.22 TYPE 2 DIABETES MELLITUS WITH STAGE 2 CHRONIC KIDNEY DISEASE, WITHOUT LONG-TERM CURRENT USE OF INSULIN (H): ICD-10-CM

## 2020-09-10 RX ORDER — SIMVASTATIN 40 MG
40 TABLET ORAL AT BEDTIME
Qty: 90 TABLET | Refills: 0 | Status: SHIPPED | OUTPATIENT
Start: 2020-09-10 | End: 2020-09-30

## 2020-09-10 RX ORDER — LISINOPRIL 10 MG/1
10 TABLET ORAL DAILY
Qty: 90 TABLET | Refills: 0 | Status: SHIPPED | OUTPATIENT
Start: 2020-09-10 | End: 2020-09-30

## 2020-09-10 NOTE — TELEPHONE ENCOUNTER
Called pt  Set up fasting labs same day as her mammogram at [facility]   Medication is being filled for 1 time refill only due to:   Over due for office visit and/or labs   JARED Solis  RN/Obed Joe

## 2020-09-15 ENCOUNTER — ANCILLARY PROCEDURE (OUTPATIENT)
Dept: MAMMOGRAPHY | Facility: CLINIC | Age: 60
End: 2020-09-15
Payer: COMMERCIAL

## 2020-09-15 DIAGNOSIS — Z12.31 VISIT FOR SCREENING MAMMOGRAM: ICD-10-CM

## 2020-09-15 PROCEDURE — 77067 SCR MAMMO BI INCL CAD: CPT | Mod: TC

## 2020-09-21 DIAGNOSIS — E11.22 TYPE 2 DIABETES MELLITUS WITH STAGE 2 CHRONIC KIDNEY DISEASE, WITHOUT LONG-TERM CURRENT USE OF INSULIN (H): ICD-10-CM

## 2020-09-21 DIAGNOSIS — N18.2 TYPE 2 DIABETES MELLITUS WITH STAGE 2 CHRONIC KIDNEY DISEASE, WITHOUT LONG-TERM CURRENT USE OF INSULIN (H): ICD-10-CM

## 2020-09-21 LAB
ALBUMIN SERPL-MCNC: 3.9 G/DL (ref 3.4–5)
ALP SERPL-CCNC: 133 U/L (ref 40–150)
ALT SERPL W P-5'-P-CCNC: 51 U/L (ref 0–50)
ANION GAP SERPL CALCULATED.3IONS-SCNC: 5 MMOL/L (ref 3–14)
AST SERPL W P-5'-P-CCNC: 39 U/L (ref 0–45)
BILIRUB SERPL-MCNC: 0.4 MG/DL (ref 0.2–1.3)
BUN SERPL-MCNC: 16 MG/DL (ref 7–30)
CALCIUM SERPL-MCNC: 9.3 MG/DL (ref 8.5–10.1)
CHLORIDE SERPL-SCNC: 104 MMOL/L (ref 94–109)
CHOLEST SERPL-MCNC: 179 MG/DL
CO2 SERPL-SCNC: 30 MMOL/L (ref 20–32)
CREAT SERPL-MCNC: 0.82 MG/DL (ref 0.52–1.04)
CREAT UR-MCNC: 252 MG/DL
GFR SERPL CREATININE-BSD FRML MDRD: 78 ML/MIN/{1.73_M2}
GLUCOSE SERPL-MCNC: 138 MG/DL (ref 70–99)
HBA1C MFR BLD: 6.7 % (ref 0–5.6)
HDLC SERPL-MCNC: 44 MG/DL
LDLC SERPL CALC-MCNC: 105 MG/DL
MICROALBUMIN UR-MCNC: 23 MG/L
MICROALBUMIN/CREAT UR: 9.09 MG/G CR (ref 0–25)
NONHDLC SERPL-MCNC: 135 MG/DL
POTASSIUM SERPL-SCNC: 4.3 MMOL/L (ref 3.4–5.3)
PROT SERPL-MCNC: 7.5 G/DL (ref 6.8–8.8)
SODIUM SERPL-SCNC: 139 MMOL/L (ref 133–144)
TRIGL SERPL-MCNC: 149 MG/DL

## 2020-09-21 PROCEDURE — 80053 COMPREHEN METABOLIC PANEL: CPT | Performed by: PHYSICIAN ASSISTANT

## 2020-09-21 PROCEDURE — 82043 UR ALBUMIN QUANTITATIVE: CPT | Performed by: PHYSICIAN ASSISTANT

## 2020-09-21 PROCEDURE — 83036 HEMOGLOBIN GLYCOSYLATED A1C: CPT | Performed by: PHYSICIAN ASSISTANT

## 2020-09-21 PROCEDURE — 36415 COLL VENOUS BLD VENIPUNCTURE: CPT | Performed by: PHYSICIAN ASSISTANT

## 2020-09-21 PROCEDURE — 80061 LIPID PANEL: CPT | Performed by: PHYSICIAN ASSISTANT

## 2020-09-30 ENCOUNTER — OFFICE VISIT (OUTPATIENT)
Dept: FAMILY MEDICINE | Facility: CLINIC | Age: 60
End: 2020-09-30
Payer: COMMERCIAL

## 2020-09-30 VITALS
DIASTOLIC BLOOD PRESSURE: 80 MMHG | HEART RATE: 100 BPM | WEIGHT: 190 LBS | RESPIRATION RATE: 24 BRPM | SYSTOLIC BLOOD PRESSURE: 138 MMHG | TEMPERATURE: 98.8 F | BODY MASS INDEX: 31.14 KG/M2

## 2020-09-30 DIAGNOSIS — N18.2 TYPE 2 DIABETES MELLITUS WITH STAGE 2 CHRONIC KIDNEY DISEASE, WITHOUT LONG-TERM CURRENT USE OF INSULIN (H): Primary | ICD-10-CM

## 2020-09-30 DIAGNOSIS — G89.29 CHRONIC SHOULDER PAIN, UNSPECIFIED LATERALITY: ICD-10-CM

## 2020-09-30 DIAGNOSIS — K21.9 LPRD (LARYNGOPHARYNGEAL REFLUX DISEASE): ICD-10-CM

## 2020-09-30 DIAGNOSIS — N18.2 CKD (CHRONIC KIDNEY DISEASE) STAGE 2, GFR 60-89 ML/MIN: ICD-10-CM

## 2020-09-30 DIAGNOSIS — E78.5 HYPERLIPIDEMIA LDL GOAL <100: ICD-10-CM

## 2020-09-30 DIAGNOSIS — F41.1 GAD (GENERALIZED ANXIETY DISORDER): ICD-10-CM

## 2020-09-30 DIAGNOSIS — J20.9 ACUTE BRONCHITIS WITH SYMPTOMS > 10 DAYS: ICD-10-CM

## 2020-09-30 DIAGNOSIS — E11.22 TYPE 2 DIABETES MELLITUS WITH STAGE 2 CHRONIC KIDNEY DISEASE, WITHOUT LONG-TERM CURRENT USE OF INSULIN (H): Primary | ICD-10-CM

## 2020-09-30 DIAGNOSIS — Z87.891 PERSONAL HISTORY OF TOBACCO USE: ICD-10-CM

## 2020-09-30 DIAGNOSIS — Z28.21 INFLUENZA VACCINATION DECLINED BY PATIENT: ICD-10-CM

## 2020-09-30 DIAGNOSIS — F33.1 MAJOR DEPRESSIVE DISORDER, RECURRENT EPISODE, MODERATE (H): ICD-10-CM

## 2020-09-30 DIAGNOSIS — F17.200 TOBACCO USE DISORDER: ICD-10-CM

## 2020-09-30 DIAGNOSIS — M25.519 CHRONIC SHOULDER PAIN, UNSPECIFIED LATERALITY: ICD-10-CM

## 2020-09-30 DIAGNOSIS — J44.89 CHRONIC BRONCHITIS WITH COPD (CHRONIC OBSTRUCTIVE PULMONARY DISEASE) (H): ICD-10-CM

## 2020-09-30 PROCEDURE — 99214 OFFICE O/P EST MOD 30 MIN: CPT | Performed by: PHYSICIAN ASSISTANT

## 2020-09-30 PROCEDURE — 99207 C FOOT EXAM  NO CHARGE: CPT | Performed by: PHYSICIAN ASSISTANT

## 2020-09-30 PROCEDURE — G0296 VISIT TO DETERM LDCT ELIG: HCPCS | Performed by: PHYSICIAN ASSISTANT

## 2020-09-30 RX ORDER — GABAPENTIN 300 MG/1
CAPSULE ORAL
Qty: 810 CAPSULE | Refills: 1 | Status: SHIPPED | OUTPATIENT
Start: 2020-09-30 | End: 2021-06-21

## 2020-09-30 RX ORDER — BUPROPION HYDROCHLORIDE 150 MG/1
150 TABLET, EXTENDED RELEASE ORAL 2 TIMES DAILY
Qty: 180 TABLET | Refills: 1 | Status: SHIPPED | OUTPATIENT
Start: 2020-09-30 | End: 2021-06-21

## 2020-09-30 RX ORDER — FLUTICASONE PROPIONATE 50 MCG
1 SPRAY, SUSPENSION (ML) NASAL DAILY
Qty: 9 ML | Refills: 1 | Status: SHIPPED | OUTPATIENT
Start: 2020-09-30

## 2020-09-30 RX ORDER — DICLOFENAC SODIUM 75 MG/1
TABLET, DELAYED RELEASE ORAL
Qty: 180 TABLET | Refills: 1 | Status: SHIPPED | OUTPATIENT
Start: 2020-09-30 | End: 2021-06-21

## 2020-09-30 RX ORDER — AZITHROMYCIN 250 MG/1
TABLET, FILM COATED ORAL
Qty: 6 TABLET | Refills: 0 | Status: SHIPPED | OUTPATIENT
Start: 2020-09-30 | End: 2020-11-02 | Stop reason: ALTCHOICE

## 2020-09-30 RX ORDER — METHOCARBAMOL 500 MG/1
TABLET, FILM COATED ORAL
Qty: 180 TABLET | Refills: 1 | Status: SHIPPED | OUTPATIENT
Start: 2020-09-30 | End: 2021-06-21

## 2020-09-30 RX ORDER — LISINOPRIL 10 MG/1
10 TABLET ORAL DAILY
Qty: 90 TABLET | Refills: 1 | Status: SHIPPED | OUTPATIENT
Start: 2020-09-30 | End: 2021-06-21

## 2020-09-30 RX ORDER — BUSPIRONE HYDROCHLORIDE 10 MG/1
20 TABLET ORAL 2 TIMES DAILY
Qty: 360 TABLET | Refills: 1 | Status: SHIPPED | OUTPATIENT
Start: 2020-09-30 | End: 2021-06-21

## 2020-09-30 RX ORDER — METFORMIN HCL 500 MG
2000 TABLET, EXTENDED RELEASE 24 HR ORAL
Qty: 360 TABLET | Refills: 1 | Status: SHIPPED | OUTPATIENT
Start: 2020-09-30 | End: 2021-06-21

## 2020-09-30 RX ORDER — TRAZODONE HYDROCHLORIDE 100 MG/1
TABLET ORAL
Qty: 180 TABLET | Refills: 1 | Status: SHIPPED | OUTPATIENT
Start: 2020-09-30 | End: 2021-06-21

## 2020-09-30 RX ORDER — SIMVASTATIN 40 MG
40 TABLET ORAL AT BEDTIME
Qty: 90 TABLET | Refills: 1 | Status: SHIPPED | OUTPATIENT
Start: 2020-09-30 | End: 2021-06-21

## 2020-09-30 ASSESSMENT — PAIN SCALES - GENERAL: PAINLEVEL: NO PAIN (0)

## 2020-09-30 NOTE — PROGRESS NOTES
Subjective     Pily Roche is a 59 year old female who presents to clinic today for the following health issues:    HPI       Diabetes Follow-up      How often are you checking your blood sugar? Twice per week    What concerns do you have today about your diabetes? None     Do you have any of these symptoms? (Select all that apply)  Excessive thirst    Taking metformin in evening.       Hyperlipidemia Follow-Up      Are you regularly taking any medication or supplement to lower your cholesterol?   Yes- simvastatin 40 mg    Are you having muscle aches or other side effects that you think could be caused by your cholesterol lowering medication?  No    Hypertension Follow-up      Do you check your blood pressure regularly outside of the clinic? Yes     Are you following a low salt diet? No    Are your blood pressures ever more than 140 on the top number (systolic) OR more   than 90 on the bottom number (diastolic), for example 140/90? No    BP Readings from Last 2 Encounters:   09/30/20 138/80   01/08/20 122/70     Hemoglobin A1C (%)   Date Value   09/21/2020 6.7 (H)   06/10/2019 7.9 (H)     LDL Cholesterol Calculated (mg/dL)   Date Value   09/21/2020 105 (H)   06/10/2019 109 (H)         How many servings of fruits and vegetables do you eat daily?  1-2    On average, how many sweetened beverages do you drink each day (Examples: soda, juice, sweet tea, etc.  Do NOT count diet or artificially sweetened beverages)?   1-3    How many days per week do you exercise enough to make your heart beat faster? 7    How many minutes a day do you exercise enough to make your heart beat faster? 30 - 60  How many days per week do you miss taking your medication? 1    What makes it hard for you to take your medications?  remembering to take    She c/o congestion/PND and cough productive of sputum (similar to previous bouts of bronchitis in the past) x 10 days.  Has tried Mucinex and Sudafed without much relief.     She did have  sinus pain at first, however this has resolved. No fevers.  She denies any fever or SOB.  No known exposure to Covid-19.     Review of Systems   Constitutional, HEENT, cardiovascular, pulmonary, GI, , musculoskeletal, neuro, skin, endocrine and psych systems are negative, except as otherwise noted.      Objective    /80   Pulse 100   Temp 98.8  F (37.1  C) (Tympanic)   Resp 24   Wt 86.2 kg (190 lb)   BMI 31.14 kg/m    Body mass index is 31.14 kg/m .  Physical Exam   GENERAL: healthy, alert and no distress  NECK: no adenopathy, no asymmetry, masses, or scars and thyroid normal to palpation  RESP: lungs clear to auscultation - no rales, rhonchi or wheezes  CV: regular rate and rhythm, normal S1 S2, no S3 or S4, no murmur, click or rub, no peripheral edema and peripheral pulses strong  ABDOMEN: soft, nontender, no hepatosplenomegaly, no masses and bowel sounds normal  MS: no gross musculoskeletal defects noted, no edema  Foot examination performed.  No lesions or skin breakdown noted.   Dorsalis pedis and posterior tibialis pulses intact bilaterally.  Sensation to monofilament test normal bilaterally.       No results found for any visits on 09/30/20.        Assessment & Plan     Acute bronchitis with symptoms > 10 days  Patient with typical symptoms of bronchitis (which she gets frequently every year).  She declines Covid-19 testing.    BRONCHITIS    * Antibiotics indicated, see orders.  * I discussed the pathophysiology of bronchitis.  I reviewed the risks and benefits of various over the counter and prescription medications.  Additionally, we reviewed the infectious nature of this condition and techniques to minimize transmission and future infections.    Patient advised to follow up if symptoms worsen or fail to improve as anticipated.     - fluticasone (FLONASE) 50 MCG/ACT nasal spray; Spray 1 spray into both nostrils daily  - azithromycin (ZITHROMAX) 250 MG tablet; Take 2 tabs on day 1, then 1 tab on  days 2-5    Type 2 diabetes mellitus with stage 2 chronic kidney disease, without long-term current use of insulin (H)  A1C at goal, will leave medication as is.   - FOOT EXAM  - lisinopril (ZESTRIL) 10 MG tablet; Take 1 tablet (10 mg) by mouth daily  - metFORMIN (GLUCOPHAGE-XR) 500 MG 24 hr tablet; Take 4 tablets (2,000 mg) by mouth daily (with dinner)    Chronic bronchitis with COPD (chronic obstructive pulmonary disease) (H)  Smoking cessation advised.     CKD (chronic kidney disease) stage 2, GFR 60-89 ml/min  Renal function stable.     GUDELIA (generalized anxiety disorder)  Anxiety levels stable, leave meds as is.   - busPIRone (BUSPAR) 10 MG tablet; Take 2 tablets (20 mg) by mouth 2 times daily    Tobacco use disorder  Cessation advised, she has not been able to quit.  Screening CT scan advised  - buPROPion (WELLBUTRIN SR) 150 MG 12 hr tablet; Take 1 tablet (150 mg) by mouth 2 times daily  - Prof fee: Shared Decisionmaking for Lung Cancer Screening  - CT Chest Lung Cancer Scrn Low Dose wo; Future    Major depressive disorder, recurrent episode, moderate (H)  Stable.   - buPROPion (WELLBUTRIN SR) 150 MG 12 hr tablet; Take 1 tablet (150 mg) by mouth 2 times daily  - traZODone (DESYREL) 100 MG tablet; TAKE 2 TABLETS BY MOUTH AT BEDTIME    Chronic shoulder pain, unspecified laterality  Stable.   - diclofenac (VOLTAREN) 75 MG EC tablet; TAKE 1 TABLET BY MOUTH TWICE DAILY AS NEEDED FOR MODERATE PAIN.  - gabapentin (NEURONTIN) 300 MG capsule; TAKE 3 CAPSULES BY MOUTH THREE TIMES DAILY  - methocarbamol (ROBAXIN) 500 MG tablet; TAKE 2 TABLETS BY MOUTH THREE TIMES DAILY AS NEEDED MUSCLE SPASMS    LPRD (laryngopharyngeal reflux disease)  Stable.   - omeprazole (PRILOSEC) 20 MG DR capsule; Take 1 capsule (20 mg) by mouth daily as needed (GERD)    Hyperlipidemia LDL goal <100  Tolerating state.   - simvastatin (ZOCOR) 40 MG tablet; Take 1 tablet (40 mg) by mouth At Bedtime    Personal history of tobacco use  Cessation  "advised.        Tobacco Cessation:   reports that she has been smoking cigarettes. She has a 45.00 pack-year smoking history. She has never used smokeless tobacco.  Tobacco Cessation Action Plan: Medication Therapy Management  (Referral to MTM)      BMI:   Estimated body mass index is 31.14 kg/m  as calculated from the following:    Height as of 1/8/20: 1.664 m (5' 5.5\").    Weight as of this encounter: 86.2 kg (190 lb).   Weight management plan: Discussed healthy diet and exercise guidelines            Return in about 6 months (around 3/30/2021) for Physical Exam.    Kath Fierro PA-C  [facility]    Lung Cancer Screening Shared Decision Making Visit     Pily Roche is eligible for lung cancer screening on the basis of the information provided in my signed lung cancer screening order.     I have discussed with patient the risks and benefits of screening for lung cancer with low-dose CT.     The risks include:  radiation exposure: one low dose chest CT has as much ionizing radiation as about 15 chest x-rays or 6 months of background radiation living in [place]    false positives: 96% of positive findings/nodules are NOT cancer, but some might still require additional diagnostic evaluation, including biopsy  over-diagnosis: some slow growing cancers that might never have been clinically significant will be detected and treated unnecessarily     The benefit of early detection of lung cancer is contingent upon adherence to annual screening or more frequent follow up if indicated.     Furthermore, reaping the benefits of screening requires Pily Roche to be willing and physically able to undergo diagnostic procedures, if indicated. Although no specific guide is available for determining severity of comorbidities, it is reasonable to withhold screening in patients who have greater mortality risk from other diseases.     We did discuss that the only way to prevent lung cancer is to not " smoke. Smoking cessation counseling was given, duration 3-10 minutes.      I did not offer risk estimation using a calculator such as this one:    ShouldIScreen

## 2020-09-30 NOTE — PATIENT INSTRUCTIONS
Jere Nascimento  Lung Cancer Screening   Frequently Asked Questions  If you are at high-risk for lung cancer, getting screened with low-dose computed tomography (LDCT) every year can help save your life. This handout offers answers to some of the most common questions about lung cancer screening. If you have other questions, please call [phone] ([phone]).     What is it?  Lung cancer screening uses special X-ray technology to create an image of your lung tissue. The exam is quick and easy and takes less than 10 seconds. We don t give you any medicine or use any needles. You can eat before and after the exam. You don t need to change your clothes as long as the clothing on your chest doesn t contain metal. But, you do need to be able to hold your breath for at least 6 seconds during the exam.    What is the goal of lung cancer screening?  The goal of lung cancer screening is to save lives. Many times, lung cancer is not found until a person starts having physical symptoms. Lung cancer screening can help detect lung cancer in the earliest stages when it may be easier to treat.    Who should be screened for lung cancer?  We suggest lung cancer screening for anyone who is at high-risk for lung cancer. You are in the high-risk group if you:      are between the ages of 55 and 79, and    have smoked at least 1 pack of cigarettes a day for 30 or more years, and    still smoke or have quit within the past 15 years.    However, if you have a new cough or shortness of breath, you should talk to your doctor before being screened.    Some national lung health advocacy groups also recommend screening for people ages 50 to 79 who have smoked an average of 1 pack of cigarettes a day for 20 years. They must also have at least 1 other risk factor for lung cancer, not including exposure to secondhand smoke. Other risk factors are having had cancer in the past, emphysema, pulmonary fibrosis, COPD, a family history of lung  cancer, or exposure to certain materials such as arsenic, asbestos, beryllium, cadmium, chromium, diesel fumes, nickel, radon or silica. Your care team can help you know if you have one of these risk factors.     Why does it matter if I have symptoms?  Certain symptoms can be a sign that you have a condition in your lungs that should be checked and treated by your doctor. These symptoms include fever, chest pain, a new or changing cough, shortness of breath that you have never felt before, coughing up blood or unexplained weight loss. Having any of these symptoms can greatly affect the results of lung cancer screening.       Should all smokers get an LDCT lung cancer screening exam?  It depends. Lung cancer screening is for a very specific group of men and women who have a history of heavy smoking over a long period of time (see  Who should be screened for lung cancer  above).  I am in the high-risk group, but have been diagnosed with cancer in the past. Is LDCT lung cancer screening right for me?  In some cases, you should not have LDCT lung screening, such as when your doctor is already following your cancer with CT scan studies. Your doctor will help you decide if LDCT lung screening is right for you.  Do I need to have a screening exam every year?  Yes. If you are in the high-risk group described earlier, you should get an LDCT lung cancer screening exam every year until you are 79, or are no longer willing or able to undergo screening and possible procedures to diagnose and treat lung cancer.  How effective is LDCT at preventing death from lung cancer?  Studies have shown that LDCT lung cancer screening can lower the risk of death from lung cancer by 20 percent in people who are at high-risk.  What are the risks?  There are some risks and limitations of LDCT lung cancer screening. We want to make sure you understand the risks and benefits, so please let us know if you have any questions. Your doctor may want to  talk with you more about these risks.    Radiation exposure: As with any exam that uses radiation, there is a very small increased risk of cancer. The amount of radiation in LDCT is small--about the same amount a person would get from a mammogram. Your doctor orders the exam when he or she feels the potential benefits outweigh the risks.    False negatives: No test is perfect, including LDCT. It is possible that you may have a medical condition, including lung cancer, that is not found during your exam. This is called a false negative result.    False positives and more testing: LDCT very often finds something in the lung that could be cancer, but in fact is not. This is called a false positive result. False positive tests often cause anxiety. To make sure these findings are not cancer, you may need to have more tests. These tests will be done only if you give us permission. Sometimes patients need a treatment that can have side effects, such as a biopsy. For more information on false positives, see  What can I expect from the results?     Findings not related to lung cancer: Your LDCT exam also takes pictures of areas of your body next to your lungs. In a very small number of cases, the CT scan will show an abnormal finding in one of these areas, such as your kidneys, adrenal glands, liver or thyroid. This finding may not be serious, but you may need more tests. Your doctor can help you decide what other tests you may need, if any.  What can I expect from the results?  About 1 out of 4 LDCT exams will find something that may need more tests. Most of the time, these findings are lung nodules. Lung nodules are very small collections of tissue in the lung. These nodules are very common, and the vast majority--more than 97 percent--are not cancer (benign). Most are normal lymph nodes or small areas of scarring from past infections.  But, if a small lung nodule is found to be cancer, the cancer can be cured more than 90  percent of the time. To know if the nodule is cancer, we may need to get more images before your next yearly screening exam. If the nodule has suspicious features (for example, it is large, has an odd shape or grows over time), we will refer you to a specialist for further testing.  Will my doctor also get the results?  Yes. Your doctor will get a copy of your results.  Is it okay to keep smoking now that there s a cancer screening exam?  No. Tobacco is one of the strongest cancer-causing agents. It causes not only lung cancer, but other cancers and cardiovascular (heart) diseases as well. The damage caused by smoking builds over time. This means that the longer you smoke, the higher your risk of disease. While it is never too late to quit, the sooner you quit, the better.  Where can I find help to quit smoking?  The best way to prevent lung cancer is to stop smoking. If you have already quit smoking, congratulations and keep it up! For help on quitting smoking, please call [facility] at 4-532-182-SJAO ([phone]) or the American [employer] Society at 1-791.150.7438 to find local resources near you.  One-on-one health coaching:  If you d prefer to work individually with a health care provider on tobacco cessation, we offer:      Medication Therapy Management:  Our specially trained pharmacists work closely with you and your doctor to help you quit smoking.  Call [phone] or [phone] (toll free).     Can Do: Health coaching offered by [facility].  [email or web address]

## 2020-10-06 ENCOUNTER — ANCILLARY PROCEDURE (OUTPATIENT)
Dept: CT IMAGING | Facility: CLINIC | Age: 60
End: 2020-10-06
Attending: PHYSICIAN ASSISTANT
Payer: COMMERCIAL

## 2020-10-06 DIAGNOSIS — F17.200 TOBACCO USE DISORDER: ICD-10-CM

## 2020-10-06 PROCEDURE — G0297 LDCT FOR LUNG CA SCREEN: HCPCS

## 2020-10-06 NOTE — LETTER
2020      Pily TORRES [facility]  [address] [place] [address]              Dear Pily,     Your CT scan of the lungs does not show any signs of lung cancer.  There are some nodules we'll want to monitor and some mild scarring from the cigarette smoke, if you can cut down on the cigarettes that will help prevent this from worsening.  Let's recheck in 1 year.     Please follow-up if you have any questions or concerns.       Resulted Orders   CT Chest Lung Cancer Scrn Low Dose wo    Narrative    CT Low Dose Lung Cancer Screening    History: Screening for lung cancer, smoking.    Number of packs-year of smokin  Current or former smoker?: Current  If former, number of years since quit?:    Comparison: Chest x-ray 2012    Technique: Helical acquisition low dose CT chest. Images reviewed in  lung, soft tissue and bone windows.  DLP: 127 (mGy*cm)  CTDIvol: 3.6 (mGy)    Findings: [All follow up of nodules are based on ACR guidelines for  lung cancer screening and measurements of each nodule size must be the  mean of the longest axial plane measurement by its perpendicular  measured to the nearest decimal and rounded up to the nearest whole  number. ]  Nodules: 7  The largest of these nodule(s) are as follows:    - 4 x 2 mm solid subpleural nodule in the right upper lobe on series:   5 image:  58.    - 3 x 2 mm solid nodule in the right upper lobe on series:  5 image:   62.    - 4 x 3  mm triangular solid nodule along the minor fissure on series:   5 image:  156. May represent perifissural lymph node.    A few scattered smaller sub-2 mm nodules are seen.    Emphysema: Mild centrilobular emphysema    Coronary artery calcium: Minimal    Additional findings: Calcified bilateral hilar lymph nodes. Calcified  pulmonary granuloma in the left upper lobe. Punctate calcifications in  the liver and spleen, likely old granulomas.      Impression    Impression:   1. ACR Assessment Category (v1.1):  Lung-RADS  "Category 2. Benign  appearance or behavior.      Recommendation:  Lung-RADS Category 2. Benign appearance or behavior.  Recommendation:  continue annual screening with Lung cancer screening  CT (please order exam code [id number]).         2. Significant Incidental Finding(s):  Category S: No.    3. Any moderate or severe Emphysema or bronchial wall thickening or  mosaic attenuation?  No    4. Avoidance of tobacco smoke is strongly advised. Please consider  referral for smoking cessation to [facility] Medication Therapy Management  (MTM) if clinically appropriate.      Download the \"LungRADS v.1.1 Assessment Categories\" table at this  site:   [email or web address]  [email or web address]    I have personally reviewed the examination and initial interpretation  and I agree with the findings.    VITALY VELAZQUEZ MD             Sincerely,        Kath Fierro PA-C/cassi                  "

## 2020-11-02 ENCOUNTER — VIRTUAL VISIT (OUTPATIENT)
Dept: FAMILY MEDICINE | Facility: CLINIC | Age: 60
End: 2020-11-02
Payer: COMMERCIAL

## 2020-11-02 DIAGNOSIS — E11.22 TYPE 2 DIABETES MELLITUS WITH STAGE 2 CHRONIC KIDNEY DISEASE, WITHOUT LONG-TERM CURRENT USE OF INSULIN (H): ICD-10-CM

## 2020-11-02 DIAGNOSIS — D22.9 ATYPICAL MOLE: ICD-10-CM

## 2020-11-02 DIAGNOSIS — J44.1 COPD EXACERBATION (H): Primary | ICD-10-CM

## 2020-11-02 DIAGNOSIS — J44.89 CHRONIC BRONCHITIS WITH COPD (CHRONIC OBSTRUCTIVE PULMONARY DISEASE) (H): ICD-10-CM

## 2020-11-02 DIAGNOSIS — G89.29 CHRONIC BILATERAL LOW BACK PAIN WITHOUT SCIATICA: ICD-10-CM

## 2020-11-02 DIAGNOSIS — N18.2 TYPE 2 DIABETES MELLITUS WITH STAGE 2 CHRONIC KIDNEY DISEASE, WITHOUT LONG-TERM CURRENT USE OF INSULIN (H): ICD-10-CM

## 2020-11-02 DIAGNOSIS — F17.200 TOBACCO USE DISORDER: ICD-10-CM

## 2020-11-02 DIAGNOSIS — M54.50 CHRONIC BILATERAL LOW BACK PAIN WITHOUT SCIATICA: ICD-10-CM

## 2020-11-02 DIAGNOSIS — F15.11 HISTORY OF METHAMPHETAMINE ABUSE (H): ICD-10-CM

## 2020-11-02 PROCEDURE — 99214 OFFICE O/P EST MOD 30 MIN: CPT | Mod: 95 | Performed by: PHYSICIAN ASSISTANT

## 2020-11-02 RX ORDER — AZITHROMYCIN 250 MG/1
TABLET, FILM COATED ORAL
Qty: 6 TABLET | Refills: 0 | Status: SHIPPED | OUTPATIENT
Start: 2020-11-02 | End: 2021-06-21

## 2020-11-02 NOTE — PROGRESS NOTES
"Pily Roche is a 59 year old female who is being evaluated via a billable telephone visit.      The patient has been notified of following:     \"This telephone visit will be conducted via a call between you and your physician/provider. We have found that certain health care needs can be provided without the need for a physical exam.  This service lets us provide the care you need with a short phone conversation.  If a prescription is necessary we can send it directly to your pharmacy.  If lab work is needed we can place an order for that and you can then stop by our lab to have the test done at a later time.    Telephone visits are billed at different rates depending on your insurance coverage. During this emergency period, for some insurers they may be billed the same as an in-person visit.  Please reach out to your insurance provider with any questions.    If during the course of the call the physician/provider feels a telephone visit is not appropriate, you will not be charged for this service.\"    Patient has given verbal consent for Telephone visit?  Yes    What phone number would you like to be contacted at? [phone]    How would you like to obtain your AVS? Mail a copy    Subjective     Pily Rohce is a 59 year old female who presents via phone visit today for the following health issues:    HPI     Concern - Medication request     Description: Pt wants to start an OTC medication and is not sure if this is a good idea. \"Keto diet\" medication.  Pt also states she would like another round of antibiotics for her cough. Pt reports no other symptoms and feels good but cough is still lingering.     Has been getting coughing on a regular basis.  Occurs when she lays down at bedtime and when she wakes up.  She denies any congestion in her nose.  Cough is productive of green/yellow mucus.    No fevers.  She feels her breathing is fine, no SOB or wheezing.    She has a script of dulera, but only uses this " if she cannot breath.  The one she currently has is .         She bought a bottle of supplements (keto pills) and wonders if this is ok to take.     Home vitals today:   111/77 mmHg  Pulse 99    She is smoking more than she is used to, currently 2 ppd.     Has a mole under her arm and one on her face.  The mole on her right cheek has been present for years, however will change in consistency (sometimes it is hard, sometimes soft).  She has a skin tag under the arm as well.    No change in size.  Does not bleed.  She would like them removed.      She also would like her records pertaining to her chronic pain.               Review of Systems   Constitutional, HEENT, cardiovascular, pulmonary, GI, , musculoskeletal, neuro, skin, endocrine and psych systems are negative, except as otherwise noted.       Objective   Vitals - Patient Reported  Systolic (Patient Reported): 111  Diastolic (Patient Reported): 77  Pulse (Patient Reported): 99      Vitals:  No vitals were obtained today due to virtual visit.    healthy, alert and no distress  PSYCH: Alert and oriented times 3; coherent speech, normal   rate and volume, able to articulate logical thoughts, able   to abstract reason, no tangential thoughts, no hallucinations   or delusions  Her affect is normal  RESP: No cough, no audible wheezing, able to talk in full sentences  Remainder of exam unable to be completed due to telephone visits              Assessment & Plan     COPD exacerbation/Chronic bronchitis with COPD (chronic obstructive pulmonary disease) (H)  Your cough is likely due to uncontrolled COPD.  I suggest you restart a controller inhaler, Dulera.  Take 2 puffs twice per day.  Limiting smoking will also help reduce your cough/symptoms.     Will also send an antibiotic, zpack.    If symptoms do not improve, please reach out and I can send over oral steroids instead.    - mometasone-formoterol (DULERA) 100-5 MCG/ACT inhaler; Inhale 2 puffs into the  lungs 2 times daily  - azithromycin (ZITHROMAX) 250 MG tablet; Take 2 tabs on day 1, then 1 tab on days 2-5    Atypical mole   I suggest you schedule with the dermatologist and say you were referred for removal so they schedule that visit initially.    - DERMATOLOGY ADULT REFERRAL; Future    Type 2 diabetes mellitus with stage 2 chronic kidney disease, without long-term current use of insulin (H)  Diet:  I am ok with you doing the keto diet short term, to help kickstart your weight loss efforts.  I do not recommend this long term however.  Instead, I suggest a low carb/Mediterranean diet.      Last A1C 1 month ago was at goal (6.7)    Chronic bilateral low back pain without sciatica  I suggest you call the clinic to obtain the number for medical records.       Tobacco use disorder  She has increased her smoking over the past few months and cough has also increased.  We discussed the correlation between these two and she knows she needs to quit.  She will reach out if she needs any assistance with this.      History of methamphetamine abuse (H)                Return in about 6 months (around 5/2/2021) for med recheck.    Kath Fierro PA-C  [facility]    Phone call duration:  18 minutes

## 2020-11-02 NOTE — PATIENT INSTRUCTIONS
Your cough is likely due to uncontrolled COPD.  I suggest you restart a controller inhaler, Dulera.  Take 2 puffs twice per day.  Limiting smoking will also help reduce your cough/symptoms.     Will also send an antibiotic, zpack.    If symptoms do not improve, please reach out and I can send over oral steroids instead.      Moles:   I suggest you schedule with the dermatologist and say you were referred for removal so they schedule that visit initially.      Diet:  I am ok with you doing the keto diet short term, to help kickstart your weight loss efforts.  I do not recommend this long term however.  Instead, I suggest a low carb/Mediterranean diet.      I suggest you call the clinic to obtain the number for medical records.

## 2020-12-21 ENCOUNTER — TELEPHONE (OUTPATIENT)
Dept: DERMATOLOGY | Facility: CLINIC | Age: 60
End: 2020-12-21

## 2020-12-21 NOTE — TELEPHONE ENCOUNTER
I spoke with Pily.  She wanted to confirm that we did get her photos.  Photos received and are located in the NOTES section of the 12/23/20 office visit encounter with Jodee Fernandez PA-C.    Carmen Cook RN

## 2020-12-21 NOTE — TELEPHONE ENCOUNTER
Patient called and left message on department VM on 12/18/20. No reason given.    Daphne Coulter LPN

## 2020-12-23 ENCOUNTER — VIRTUAL VISIT (OUTPATIENT)
Dept: DERMATOLOGY | Facility: CLINIC | Age: 60
End: 2020-12-23
Payer: COMMERCIAL

## 2020-12-23 DIAGNOSIS — D22.9 ATYPICAL MOLE: ICD-10-CM

## 2020-12-23 PROCEDURE — 99202 OFFICE O/P NEW SF 15 MIN: CPT | Mod: 95 | Performed by: PHYSICIAN ASSISTANT

## 2020-12-23 NOTE — LETTER
12/23/2020         RE: Pily Roche  [address]   [place] [address]        Dear Colleague,    Thank you for referring your patient, Pily Roche, to the [facility]. Please see a copy of my visit note below.                [facility] Dermatology Record:  Store and Forward and Telephone [phone]      Dermatology Problem List:  NUB x2 - patient to come in for potential bx of sites (R cheek, L underarm)    Encounter Date: Dec 23, 2020    CC:   Chief Complaint   Patient presents with     Derm Problem       History of Present Illness:  Pily Roche is a 60 year old female who presents for 2 spots of concern. Referral. Notes a spot on the R cheek which has been present since she was in high school. It has slowly grown over time, sometimes it is hard, but other times it is soft. She would like it removed. She notes pain when she bumps it. It does not bleed or ooze. Additionally notes a spot on her L underarm which has been present as long as she can remember, notes she can hold it/pull it, but that it has a point on it. She is worried she will cut it while shaving. No personal hx of skin cancer. Father with hx of NMSC. Otherwise well with no other concerns.     ROS: Patient is generally feeling well today    Physical Examination:  General: Well-appearing, appropriately-developed individual.  Skin:  Exam was performed by photo review and is significant for the following:  -R cheek pink to purple nodule    Labs:  n/a    Past Medical History:   Patient Active Problem List   Diagnosis     Insomnia     Chronic low back pain     Elevated liver enzymes     Moderate major depression (H)     Disorder of bursae and tendons in shoulder region     Chronic bronchitis with COPD (chronic obstructive pulmonary disease) (H)     Advance care planning     Type 2 diabetes, HbA1c goal < 7% (H)     Hyperlipidemia LDL goal <100     Impingement syndrome, shoulder     Rotator cuff tear     AC  "separation     Tobacco use disorder     Anxiety     Chronic shoulder pain, unspecified laterality     CKD (chronic kidney disease) stage 2, GFR 60-89 ml/min     Macular degeneration     Type II diabetes mellitus with stage 2 chronic kidney disease (H)     Non morbid obesity due to excess calories     Menopausal symptoms     Chronic bilateral low back pain without sciatica     Chronic pain syndrome     Type 2 diabetes mellitus without retinopathy (H)     H/O alcohol abuse     GUDELIA (generalized anxiety disorder)     Elevated blood pressure reading without diagnosis of hypertension     Ingrown toenail without infection     LPRD (laryngopharyngeal reflux disease)     Major depressive disorder, recurrent episode, moderate (H)     History of methamphetamine abuse (H)     Past Medical History:   Diagnosis Date     Advanced directives, counseling/discussion 2/26/2013    Patient does not have an Advance/Health Care Directive (HCD), given \"What is Advance Care Planning?\" flyer.  Maureen Iglesias February 26, 2013      Arthritis      Disorders of bursae and tendons in shoulder region, unspecified 6/8/2010     Dysfunctional uterine bleeding      History of substance abuse (H) 2006    cocaine, completed rehab     Hyperlipidemia      Insomnia      Lyme disease 1990     Pain in shoulder 3/16/2010     Seasonal allergies      Tobacco use disorders      Type 2 diabetes, HbA1c goal < 7% (H) 7/18/2013     Past Surgical History:   Procedure Laterality Date     C SHOULDER SURG PROC UNLISTED  1992    X 3 (broken clavicle and rotator cuff tear with impingement     HC SACROPLASTY  1990's    Herniated L4-L5 X 2     HYSTERECTOMY, PAP NO LONGER INDICATED  2005     HYSTERECTOMY, VAGINAL  6/28/05    Ovaries in Place       Social History:  Patient reports that she has been smoking cigarettes. She has a 45.00 pack-year smoking history. She has never used smokeless tobacco. She reports current alcohol use of about 5.0 - 10.0 standard drinks of " alcohol per week. She reports current drug use.    Family History:  Family History   Problem Relation Age of Onset     Diabetes Mother      Hypertension Mother      Gynecology Mother      Arthritis Mother      Thyroid Disease Mother      Allergies Mother      Lipids Father      Heart Disease Father      C.A.D. Father      Macular Degeneration Father      Hypertension Maternal Grandmother      Arthritis Maternal Grandmother      Cancer Maternal Grandmother      Cancer Maternal Grandfather      C.A.D. Maternal Grandfather      Asthma Paternal Grandmother      C.A.D. Paternal Grandmother      Cerebrovascular Disease Paternal Grandfather      Alzheimer Disease Paternal Grandfather      Arthritis Paternal Grandfather      C.A.D. Paternal Grandfather      Cardiovascular Paternal Grandfather      Circulatory Paternal Grandfather      Glaucoma No family hx of        Medications:  Current Outpatient Medications   Medication     albuterol (VENTOLIN HFA) 108 (90 Base) MCG/ACT inhaler     alcohol swab prep pads     ALLERGY RELIEF 10 MG tablet     aspirin 81 MG tablet     azithromycin (ZITHROMAX) 250 MG tablet     blood glucose (NO BRAND SPECIFIED) test strip     blood glucose monitoring (NO BRAND SPECIFIED) meter device kit     buPROPion (WELLBUTRIN SR) 150 MG 12 hr tablet     busPIRone (BUSPAR) 10 MG tablet     diclofenac (VOLTAREN) 75 MG EC tablet     fluticasone (FLONASE) 50 MCG/ACT nasal spray     gabapentin (NEURONTIN) 300 MG capsule     lisinopril (ZESTRIL) 10 MG tablet     metFORMIN (GLUCOPHAGE-XR) 500 MG 24 hr tablet     methocarbamol (ROBAXIN) 500 MG tablet     mometasone-formoterol (DULERA) 100-5 MCG/ACT inhaler     omeprazole (PRILOSEC) 20 MG DR capsule     order for DME     polyethylene glycol-propylene glycol (SYSTANE ULTRA) 0.4-0.3 % SOLN ophthalmic solution     simvastatin (ZOCOR) 40 MG tablet     traZODone (DESYREL) 100 MG tablet     No current facility-administered medications for this visit.            Allergies   Allergen Reactions     Quetiapine Anaphylaxis     Seroquel [Quetiapine Fumarate]      Insomnia  , anhedonia       Ambien [Zolpidem Tartrate] Difficulty breathing     Reaction after taking 5 mg on Wed, Thurs, then drank Friday afternoon and developed symptoms,  Irrability.       Chantix [Varenicline]      Zolpidem Unknown           Impression and Recommendations (Patient Counseled on the Following):  1.NUB x2 - R cheek, L underarm - ddx: SK vs BCC vs other or potentially KA of the cheek  -patient to come into clinic for skin exam and potentially two biopsies of the above mentioned sites pending exam      Follow-up:   Follow-up with dermatology in approximately 6mo. Earlier for new or changing lesions or rash.      Staff only    All risks, benefits and alternatives were discussed with patient.  Patient is in agreement and understands the assessment and plan.  All questions were answered.    Jodee Fernandez PA-C, MPAS  [facility]: Phone: [phone], Fax: [phone]  [facility]: Phone: [phone],  Fax: [phone]  _____________________________________________________________________________    Teledermatology information:  - Location of teledermatologist:  ([facility] )  - Image quality and interpretability: acceptable  - Date of images: see mychart  -Length of call: 9min  - Date of report: 12/23/2020     Teledermatology Nurse Call Patients:     Are you  in the [facility] at the time of the encounter? yes    Today's visit will be billed to you and your insurance.    A teledermatology visit is not as thorough as an in-person visit and the quality of the photograph sent may not be of the same quality as that taken by the dermatology clinic.      Patient summary of issue : Lesion on right cheek since she was 17. Some days it feels hard, other days it feels soft. Started  out looking like a pimple and it never opened. Left underarm lesion, she is worried about nicking it with a razor blade.   Location of problem on body: Right cheek and left underarm  How long has area/symptoms been present: Both have been present for many years  What makes it better?: n/a  What makes it worse?:n/a  Other symptoms include the following: n/a  Which medications have been tried, for how long, and did they make it better or worse (ex. Triamcinolone, used twice daily for 2 weeks, not improved): n/a  The patient has seen a dermatologist, last when she was a child.  The patient hasno past medical history of skin cancer  ROS: The patient is generally feeling well.             Again, thank you for allowing me to participate in the care of your patient.        Sincerely,        Jodee Fernandez PA-C

## 2020-12-23 NOTE — PATIENT INSTRUCTIONS
[facility] Dermatology Visit    Thank you for allowing us to participate in your care. Your findings, instructions and follow-up plan are as follows:         When should I call my doctor?    If you are worsening or not improving, please, contact us or seek urgent care as noted below.     Who should I call with questions (adults)?    [facility] (adult and pediatric): [phone]     [facility] (adult): [phone]    For urgent needs outside of business hours call the [facility] at [phone] and ask for the dermatology resident on call    If this is a medical emergency and you are unable to reach an ER, Call 911      Who should I call with questions (pediatric)?  [facility]- Pediatric Dermatology  Dr. Neda Lucas, Dr. Krupa Guzman, Dr. Shaye Tinoco, Natividad Wilkerson, PA  Dr. Radha Rodgers, Dr. Saumya Barrientos & Dr. Mickey Jacob  Non Urgent  Nurse Triage Line; [phone]- Iliana and Patti RN Care Coordinators   Adrianna (/Complex ) [phone]    If you need a prescription refill, please contact your pharmacy. Refills are approved or denied by our Physicians during normal business hours, Monday through Fridays  Per office policy, refills will not be granted if you have not been seen within the past year (or sooner depending on your child's condition)    Scheduling Information:  Pediatric Appointment Scheduling and Call Center [phone]  Radiology Scheduling- [phone]  Sedation Unit Scheduling- [phone]  [place] Scheduling- General [phone]; Pediatric Dermatology [phone]  Main  Services: [phone]  Vietnamese: [phone]  Anguillan: [phone]  Hmong/Greek/Yi: [phone]  Preadmission Nursing Department Fax Number: [phone] (Fax all pre-operative paperwork to this number)    For urgent matters arising during evenings,  weekends, or holidays that cannot wait for normal business hours please call [phone] and ask for the Dermatology Resident On-Call to be paged.

## 2020-12-23 NOTE — PROGRESS NOTES
[facility] Dermatology Record:  Store and Forward and Telephone [phone]      Dermatology Problem List:  NUB x2 - patient to come in for potential bx of sites (R cheek, L underarm)    Encounter Date: Dec 23, 2020    CC:   Chief Complaint   Patient presents with     Derm Problem       History of Present Illness:  Pily Roche is a 60 year old female who presents for 2 spots of concern. Referral. Notes a spot on the R cheek which has been present since she was in high school. It has slowly grown over time, sometimes it is hard, but other times it is soft. She would like it removed. She notes pain when she bumps it. It does not bleed or ooze. Additionally notes a spot on her L underarm which has been present as long as she can remember, notes she can hold it/pull it, but that it has a point on it. She is worried she will cut it while shaving. No personal hx of skin cancer. Father with hx of NMSC. Otherwise well with no other concerns.     ROS: Patient is generally feeling well today    Physical Examination:  General: Well-appearing, appropriately-developed individual.  Skin:  Exam was performed by photo review and is significant for the following:  -R cheek pink to purple nodule    Labs:  n/a    Past Medical History:   Patient Active Problem List   Diagnosis     Insomnia     Chronic low back pain     Elevated liver enzymes     Moderate major depression (H)     Disorder of bursae and tendons in shoulder region     Chronic bronchitis with COPD (chronic obstructive pulmonary disease) (H)     Advance care planning     Type 2 diabetes, HbA1c goal < 7% (H)     Hyperlipidemia LDL goal <100     Impingement syndrome, shoulder     Rotator cuff tear     AC separation     Tobacco use disorder     Anxiety     Chronic shoulder pain, unspecified laterality     CKD (chronic kidney disease) stage 2, GFR 60-89 ml/min     Macular degeneration     Type II diabetes mellitus with stage 2 chronic kidney disease (H)     Non morbid  "obesity due to excess calories     Menopausal symptoms     Chronic bilateral low back pain without sciatica     Chronic pain syndrome     Type 2 diabetes mellitus without retinopathy (H)     H/O alcohol abuse     GUDELIA (generalized anxiety disorder)     Elevated blood pressure reading without diagnosis of hypertension     Ingrown toenail without infection     LPRD (laryngopharyngeal reflux disease)     Major depressive disorder, recurrent episode, moderate (H)     History of methamphetamine abuse (H)     Past Medical History:   Diagnosis Date     Advanced directives, counseling/discussion 2/26/2013    Patient does not have an Advance/Health Care Directive (HCD), given \"What is Advance Care Planning?\" flyer.  Maureen Iglesias February 26, 2013      Arthritis      Disorders of bursae and tendons in shoulder region, unspecified 6/8/2010     Dysfunctional uterine bleeding      History of substance abuse (H) 2006    cocaine, completed rehab     Hyperlipidemia      Insomnia      Lyme disease 1990     Pain in shoulder 3/16/2010     Seasonal allergies      Tobacco use disorders      Type 2 diabetes, HbA1c goal < 7% (H) 7/18/2013     Past Surgical History:   Procedure Laterality Date     C SHOULDER SURG PROC UNLISTED  1992    X 3 (broken clavicle and rotator cuff tear with impingement     HC SACROPLASTY  1990's    Herniated L4-L5 X 2     HYSTERECTOMY, PAP NO LONGER INDICATED  2005     HYSTERECTOMY, VAGINAL  6/28/05    Ovaries in Place       Social History:  Patient reports that she has been smoking cigarettes. She has a 45.00 pack-year smoking history. She has never used smokeless tobacco. She reports current alcohol use of about 5.0 - 10.0 standard drinks of alcohol per week. She reports current drug use.    Family History:  Family History   Problem Relation Age of Onset     Diabetes Mother      Hypertension Mother      Gynecology Mother      Arthritis Mother      Thyroid Disease Mother      Allergies Mother      Lipids " Father      Heart Disease Father      C.A.D. Father      Macular Degeneration Father      Hypertension Maternal Grandmother      Arthritis Maternal Grandmother      Cancer Maternal Grandmother      Cancer Maternal Grandfather      C.A.D. Maternal Grandfather      Asthma Paternal Grandmother      C.A.D. Paternal Grandmother      Cerebrovascular Disease Paternal Grandfather      Alzheimer Disease Paternal Grandfather      Arthritis Paternal Grandfather      C.A.D. Paternal Grandfather      Cardiovascular Paternal Grandfather      Circulatory Paternal Grandfather      Glaucoma No family hx of        Medications:  Current Outpatient Medications   Medication     albuterol (VENTOLIN HFA) 108 (90 Base) MCG/ACT inhaler     alcohol swab prep pads     ALLERGY RELIEF 10 MG tablet     aspirin 81 MG tablet     azithromycin (ZITHROMAX) 250 MG tablet     blood glucose (NO BRAND SPECIFIED) test strip     blood glucose monitoring (NO BRAND SPECIFIED) meter device kit     buPROPion (WELLBUTRIN SR) 150 MG 12 hr tablet     busPIRone (BUSPAR) 10 MG tablet     diclofenac (VOLTAREN) 75 MG EC tablet     fluticasone (FLONASE) 50 MCG/ACT nasal spray     gabapentin (NEURONTIN) 300 MG capsule     lisinopril (ZESTRIL) 10 MG tablet     metFORMIN (GLUCOPHAGE-XR) 500 MG 24 hr tablet     methocarbamol (ROBAXIN) 500 MG tablet     mometasone-formoterol (DULERA) 100-5 MCG/ACT inhaler     omeprazole (PRILOSEC) 20 MG DR capsule     order for DME     polyethylene glycol-propylene glycol (SYSTANE ULTRA) 0.4-0.3 % SOLN ophthalmic solution     simvastatin (ZOCOR) 40 MG tablet     traZODone (DESYREL) 100 MG tablet     No current facility-administered medications for this visit.           Allergies   Allergen Reactions     Quetiapine Anaphylaxis     Seroquel [Quetiapine Fumarate]      Insomnia  , anhedonia       Ambien [Zolpidem Tartrate] Difficulty breathing     Reaction after taking 5 mg on Wed, Thurs, then drank Friday afternoon and developed symptoms,   Irrability.       Chantix [Varenicline]      Zolpidem Unknown           Impression and Recommendations (Patient Counseled on the Following):  1.NUB x2 - R cheek, L underarm - ddx: SK vs BCC vs other or potentially KA of the cheek  -patient to come into clinic for skin exam and potentially two biopsies of the above mentioned sites pending exam      Follow-up:   Follow-up with dermatology in approximately 6mo. Earlier for new or changing lesions or rash.      Staff only    All risks, benefits and alternatives were discussed with patient.  Patient is in agreement and understands the assessment and plan.  All questions were answered.    Jodee Fernandez PA-C, MPAS  [facility]: Phone: [phone], Fax: [phone]  [facility]: Phone: [phone],  Fax: [phone]  _____________________________________________________________________________    Teledermatology information:  - Location of teledermatologist:  ([facility] )  - Image quality and interpretability: acceptable  - Date of images: see mychart  -Length of call: 9min  - Date of report: 12/23/2020     Teledermatology Nurse Call Patients:     Are you  in the [facility] at the time of the encounter? yes    Today's visit will be billed to you and your insurance.    A teledermatology visit is not as thorough as an in-person visit and the quality of the photograph sent may not be of the same quality as that taken by the dermatology clinic.      Patient summary of issue : Lesion on right cheek since she was 17. Some days it feels hard, other days it feels soft. Started out looking like a pimple and it never opened. Left underarm lesion, she is worried about nicking it with a razor blade.   Location of problem on body: Right cheek and left underarm  How long has area/symptoms been present: Both have been present for many years  What makes it  better?: n/a  What makes it worse?:n/a  Other symptoms include the following: n/a  Which medications have been tried, for how long, and did they make it better or worse (ex. Triamcinolone, used twice daily for 2 weeks, not improved): n/a  The patient has seen a dermatologist, last when she was a child.  The patient hasno past medical history of skin cancer  ROS: The patient is generally feeling well.

## 2021-02-02 ENCOUNTER — TELEPHONE (OUTPATIENT)
Dept: DERMATOLOGY | Facility: CLINIC | Age: 61
End: 2021-02-02

## 2021-02-02 NOTE — TELEPHONE ENCOUNTER
[facility] Call Center    Phone Message    May a detailed message be left on voicemail: yes     Reason for Call: Patient calling in regards to scheduling a biopsy.  Patient states after the last appointment with the dermatologist they stated she may need to come back in for two different biopsies.  She has not heard anything since then.  Please call patient back to advise.  Thank you.    Action Taken: Message routed to:  Adult Clinics: Dermatology p [id number]    Travel Screening: Not Applicable

## 2021-02-02 NOTE — TELEPHONE ENCOUNTER
Patient has been scheduled.      Sera Perkins  Surgical Specialties Procedure   [employer] [place]  11/17/2020 8:33 AM

## 2021-02-02 NOTE — TELEPHONE ENCOUNTER
Carmen Cook, RN  [facility] Procedure Coordinator 1 hour ago (2:03 PM)     Please schedule in person visit with Jodee for skin exam and 2 possible biopsies (right cheek, left underarm).    Routing comment

## 2021-02-24 ENCOUNTER — OFFICE VISIT (OUTPATIENT)
Dept: DERMATOLOGY | Facility: CLINIC | Age: 61
End: 2021-02-24
Payer: COMMERCIAL

## 2021-02-24 DIAGNOSIS — D48.5 NEOPLASM OF UNCERTAIN BEHAVIOR OF SKIN: ICD-10-CM

## 2021-02-24 DIAGNOSIS — D22.9 MULTIPLE BENIGN NEVI: ICD-10-CM

## 2021-02-24 DIAGNOSIS — L81.4 SOLAR LENTIGO: ICD-10-CM

## 2021-02-24 DIAGNOSIS — L82.1 SEBORRHEIC KERATOSES: Primary | ICD-10-CM

## 2021-02-24 DIAGNOSIS — L91.8 SKIN TAG: ICD-10-CM

## 2021-02-24 DIAGNOSIS — D18.01 CHERRY ANGIOMA: ICD-10-CM

## 2021-02-24 PROCEDURE — 11102 TANGNTL BX SKIN SINGLE LES: CPT | Performed by: PHYSICIAN ASSISTANT

## 2021-02-24 PROCEDURE — 88305 TISSUE EXAM BY PATHOLOGIST: CPT | Performed by: DERMATOLOGY

## 2021-02-24 PROCEDURE — 99214 OFFICE O/P EST MOD 30 MIN: CPT | Mod: 25 | Performed by: PHYSICIAN ASSISTANT

## 2021-02-24 RX ORDER — LIDOCAINE HYDROCHLORIDE AND EPINEPHRINE 10; 10 MG/ML; UG/ML
3 INJECTION, SOLUTION INFILTRATION; PERINEURAL ONCE
Status: ACTIVE | OUTPATIENT
Start: 2021-02-24

## 2021-02-24 ASSESSMENT — PAIN SCALES - GENERAL: PAINLEVEL: NO PAIN (0)

## 2021-02-24 NOTE — PROGRESS NOTES
The following medication was given:     MEDICATION:  Lidocaine with epinephrine 1% [id number]  ROUTE: SQ  SITE: see procedure note  DOSE: 2 cc  LOT #: -[id number]  : Mat  EXPIRATION DATE: 01-July-2021  NDC#: 8860-7910-84   Was there drug waste?   Multi-dose vial: Yes    Ruby Olmos LPN  February 24, 2021

## 2021-02-24 NOTE — PROGRESS NOTES
[facility] Dermatology Note  Encounter Date: Feb 24, 2021  Office Visit     Dermatology Problem List:  1. NUB - right mid cheek  - bx 2/24/21    FHx: Unknown skin cancer in grandfather and father.    ____________________________________________    Assessment & Plan:  # Neoplasm of uncertain behavior on the right mid cheek. The differential diagnosis includes BCC vs KA vs other benign lesion.   - See procedure note.     # Skin tag. L axilla   - No further intervention needed.     # Cherry angioma(s).    - No further intervention needed.    # Multiple clinically benign nevi.    - No further intervention needed.     # Seborrheic keratosis, non irritated.   - No further intervention needed.     # Solar lentigines.   - No further intervention needed.     Procedures Performed:   -Shave biopsy: Location(s) right cheek.  After discussion of benefits and risks including but not limited to bleeding/bruising, pain/swelling, infection, scar, incomplete removal, nerve damage/numbness, recurrence, and non-diagnostic biopsy, written consent, verbal consent and photographs were obtained. Time-out was performed. The area was cleaned with isopropyl alcohol. 0.5mL of 1% lidocaine with epinephrine was injected to obtain adequate anesthesia of each lesion. Shave biopsy was performed. Hemostasis was achieved with aluminium chloride. Vaseline and a sterile dressing were applied. The patient tolerated the procedure and no complications were noted. The patient was provided with verbal and written post care instructions.       Follow-up: pending path results    Staff and Scribe:     Scribe Disclosure:   I, Carlos Enrique Mesa, am serving as a scribe to document services personally performed by Jodee Fernandez PA-C, based on data collection and the provider's statements to me.    Provider Disclosure:   The documentation recorded by the scribe accurately reflects the services I personally performed and the decisions made by  me.    All risks, benefits and alternatives were discussed with patient.  Patient is in agreement and understands the assessment and plan.  All questions were answered.    Jodee Fernandez PA-C, MPAS  [facility] [place]: Phone: [phone], Fax: [phone]  [facility]: Phone: [phone],  Fax: [phone]  ____________________________________________    CC: Skin Check (recheck the 2 spots from the last virtual visit. Also new spot of concern on the back . Hx of SC in father and grandfather- unknown type)    HPI:  Ms. Pily Roche is a(n) 60 year old female who presents today as a return patient for a skin check.    Last seen on 12/23/20 in a virtual visit. At that time, spots on the R cheek and L underarm were noted. Patient was instructed to come to follow up for a skin exam and possible biopsy of those two lesions.    Today, she presents for re evaluation of the spots on her R cheek and L underarm. She also has a new spot of concern on her left lower back as well.    Patient is otherwise feeling well, without additional concerns.    Labs:  NA    Physical Exam:  Vitals: There were no vitals taken for this visit.  SKIN: Waist-up skin, which includes the head/face, neck, both arms, chest, back, abdomen, digits and/or nails was examined.  - Slater Type II, less than 100 nevi  - Open comedone on the left cheek  - Right Mid Cheek: 1 cm pink papule with a raised angioma like structure in the center  - There is(are) skin colored pedunculated papules on the left axilla.   - Linear scar on the mid back from an excision of a lipoma vs cyst, patient reported  - There are dome shaped bright red papules on the trunk and extremities.   - Multiple regular brown pigmented macules and papules are identified across the body.   - Scattered brown macules on sun exposed areas.  - There are waxy stuck on tan to brown papules on the trunk and  extremities..    - No other lesions of concern on areas examined.     Medications:  Current Outpatient Medications   Medication     albuterol (VENTOLIN HFA) 108 (90 Base) MCG/ACT inhaler     alcohol swab prep pads     ALLERGY RELIEF 10 MG tablet     aspirin 81 MG tablet     azithromycin (ZITHROMAX) 250 MG tablet     blood glucose (NO BRAND SPECIFIED) test strip     blood glucose monitoring (NO BRAND SPECIFIED) meter device kit     buPROPion (WELLBUTRIN SR) 150 MG 12 hr tablet     busPIRone (BUSPAR) 10 MG tablet     diclofenac (VOLTAREN) 75 MG EC tablet     fluticasone (FLONASE) 50 MCG/ACT nasal spray     gabapentin (NEURONTIN) 300 MG capsule     lisinopril (ZESTRIL) 10 MG tablet     metFORMIN (GLUCOPHAGE-XR) 500 MG 24 hr tablet     methocarbamol (ROBAXIN) 500 MG tablet     mometasone-formoterol (DULERA) 100-5 MCG/ACT inhaler     omeprazole (PRILOSEC) 20 MG DR capsule     order for DME     polyethylene glycol-propylene glycol (SYSTANE ULTRA) 0.4-0.3 % SOLN ophthalmic solution     simvastatin (ZOCOR) 40 MG tablet     traZODone (DESYREL) 100 MG tablet     No current facility-administered medications for this visit.       Past Medical History:   Patient Active Problem List   Diagnosis     Insomnia     Chronic low back pain     Elevated liver enzymes     Moderate major depression (H)     Disorder of bursae and tendons in shoulder region     Chronic bronchitis with COPD (chronic obstructive pulmonary disease) (H)     Advance care planning     Type 2 diabetes, HbA1c goal < 7% (H)     Hyperlipidemia LDL goal <100     Impingement syndrome, shoulder     Rotator cuff tear     AC separation     Tobacco use disorder     Anxiety     Chronic shoulder pain, unspecified laterality     CKD (chronic kidney disease) stage 2, GFR 60-89 ml/min     Macular degeneration     Type II diabetes mellitus with stage 2 chronic kidney disease (H)     Non morbid obesity due to excess calories     Menopausal symptoms     Chronic bilateral low back  "pain without sciatica     Chronic pain syndrome     Type 2 diabetes mellitus without retinopathy (H)     H/O alcohol abuse     GUDELIA (generalized anxiety disorder)     Elevated blood pressure reading without diagnosis of hypertension     Ingrown toenail without infection     LPRD (laryngopharyngeal reflux disease)     Major depressive disorder, recurrent episode, moderate (H)     History of methamphetamine abuse (H)     Past Medical History:   Diagnosis Date     Advanced directives, counseling/discussion 2/26/2013    Patient does not have an Advance/Health Care Directive (HCD), given \"What is Advance Care Planning?\" flyer.  Maureen Iglesias February 26, 2013      Arthritis      Disorders of bursae and tendons in shoulder region, unspecified 6/8/2010     Dysfunctional uterine bleeding      History of substance abuse (H) 2006    cocaine, completed rehab     Hyperlipidemia      Insomnia      Lyme disease 1990     Pain in shoulder 3/16/2010     Seasonal allergies      Tobacco use disorders      Type 2 diabetes, HbA1c goal < 7% (H) 7/18/2013       CC Dr. Daly on close of this encounter.  "

## 2021-02-24 NOTE — LETTER
2/24/2021         RE: Pily Roche  [address] [facility] [address]        Dear Colleague,    Thank you for referring your patient, Pily Roche, to the [facility]. Please see a copy of my visit note below.    [facility] Dermatology Note  Encounter Date: Feb 24, 2021  Office Visit     Dermatology Problem List:  1. NUB - right mid cheek  - bx 2/24/21    FHx: Unknown skin cancer in grandfather and father.    ____________________________________________    Assessment & Plan:  # Neoplasm of uncertain behavior on the right mid cheek. The differential diagnosis includes BCC vs KA vs other benign lesion.   - See procedure note.     # Skin tag. L axilla   - No further intervention needed.     # Cherry angioma(s).    - No further intervention needed.    # Multiple clinically benign nevi.    - No further intervention needed.     # Seborrheic keratosis, non irritated.   - No further intervention needed.     # Solar lentigines.   - No further intervention needed.     Procedures Performed:   -Shave biopsy: Location(s) right cheek.  After discussion of benefits and risks including but not limited to bleeding/bruising, pain/swelling, infection, scar, incomplete removal, nerve damage/numbness, recurrence, and non-diagnostic biopsy, written consent, verbal consent and photographs were obtained. Time-out was performed. The area was cleaned with isopropyl alcohol. 0.5mL of 1% lidocaine with epinephrine was injected to obtain adequate anesthesia of each lesion. Shave biopsy was performed. Hemostasis was achieved with aluminium chloride. Vaseline and a sterile dressing were applied. The patient tolerated the procedure and no complications were noted. The patient was provided with verbal and written post care instructions.       Follow-up: pending path results    Staff and Scribe:     Scribe Disclosure:   Carlos Enrique SAINI, am serving as a scribe to document services  personally performed by Jodee Fernandez PA-C, based on data collection and the provider's statements to me.    Provider Disclosure:   The documentation recorded by the scribe accurately reflects the services I personally performed and the decisions made by me.    All risks, benefits and alternatives were discussed with patient.  Patient is in agreement and understands the assessment and plan.  All questions were answered.    Jodee Fernandez PA-C, MPAS  [facility]: Phone: [phone], Fax: [phone]  [facility]: Phone: [phone],  Fax: [phone]  ____________________________________________    CC: Skin Check (recheck the 2 spots from the last virtual visit. Also new spot of concern on the back . Hx of SC in father and grandfather- unknown type)    HPI:  Ms. Pily Roche is a(n) 60 year old female who presents today as a return patient for a skin check.    Last seen on 12/23/20 in a virtual visit. At that time, spots on the R cheek and L underarm were noted. Patient was instructed to come to follow up for a skin exam and possible biopsy of those two lesions.    Today, she presents for re evaluation of the spots on her R cheek and L underarm. She also has a new spot of concern on her left lower back as well.    Patient is otherwise feeling well, without additional concerns.    Labs:  NA    Physical Exam:  Vitals: There were no vitals taken for this visit.  SKIN: Waist-up skin, which includes the head/face, neck, both arms, chest, back, abdomen, digits and/or nails was examined.  - Slater Type II, less than 100 nevi  - Open comedone on the left cheek  - Right Mid Cheek: 1 cm pink papule with a raised angioma like structure in the center  - There is(are) skin colored pedunculated papules on the left axilla.   - Linear scar on the mid back from an excision of a lipoma vs cyst, patient reported  - There are dome  shaped bright red papules on the trunk and extremities.   - Multiple regular brown pigmented macules and papules are identified across the body.   - Scattered brown macules on sun exposed areas.  - There are waxy stuck on tan to brown papules on the trunk and extremities..    - No other lesions of concern on areas examined.     Medications:  Current Outpatient Medications   Medication     albuterol (VENTOLIN HFA) 108 (90 Base) MCG/ACT inhaler     alcohol swab prep pads     ALLERGY RELIEF 10 MG tablet     aspirin 81 MG tablet     azithromycin (ZITHROMAX) 250 MG tablet     blood glucose (NO BRAND SPECIFIED) test strip     blood glucose monitoring (NO BRAND SPECIFIED) meter device kit     buPROPion (WELLBUTRIN SR) 150 MG 12 hr tablet     busPIRone (BUSPAR) 10 MG tablet     diclofenac (VOLTAREN) 75 MG EC tablet     fluticasone (FLONASE) 50 MCG/ACT nasal spray     gabapentin (NEURONTIN) 300 MG capsule     lisinopril (ZESTRIL) 10 MG tablet     metFORMIN (GLUCOPHAGE-XR) 500 MG 24 hr tablet     methocarbamol (ROBAXIN) 500 MG tablet     mometasone-formoterol (DULERA) 100-5 MCG/ACT inhaler     omeprazole (PRILOSEC) 20 MG DR capsule     order for DME     polyethylene glycol-propylene glycol (SYSTANE ULTRA) 0.4-0.3 % SOLN ophthalmic solution     simvastatin (ZOCOR) 40 MG tablet     traZODone (DESYREL) 100 MG tablet     No current facility-administered medications for this visit.       Past Medical History:   Patient Active Problem List   Diagnosis     Insomnia     Chronic low back pain     Elevated liver enzymes     Moderate major depression (H)     Disorder of bursae and tendons in shoulder region     Chronic bronchitis with COPD (chronic obstructive pulmonary disease) (H)     Advance care planning     Type 2 diabetes, HbA1c goal < 7% (H)     Hyperlipidemia LDL goal <100     Impingement syndrome, shoulder     Rotator cuff tear     AC separation     Tobacco use disorder     Anxiety     Chronic shoulder pain, unspecified  "laterality     CKD (chronic kidney disease) stage 2, GFR 60-89 ml/min     Macular degeneration     Type II diabetes mellitus with stage 2 chronic kidney disease (H)     Non morbid obesity due to excess calories     Menopausal symptoms     Chronic bilateral low back pain without sciatica     Chronic pain syndrome     Type 2 diabetes mellitus without retinopathy (H)     H/O alcohol abuse     GUDELIA (generalized anxiety disorder)     Elevated blood pressure reading without diagnosis of hypertension     Ingrown toenail without infection     LPRD (laryngopharyngeal reflux disease)     Major depressive disorder, recurrent episode, moderate (H)     History of methamphetamine abuse (H)     Past Medical History:   Diagnosis Date     Advanced directives, counseling/discussion 2/26/2013    Patient does not have an Advance/Health Care Directive (HCD), given \"What is Advance Care Planning?\" flyer.  Maureen Iglesias February 26, 2013      Arthritis      Disorders of bursae and tendons in shoulder region, unspecified 6/8/2010     Dysfunctional uterine bleeding      History of substance abuse (H) 2006    cocaine, completed rehab     Hyperlipidemia      Insomnia      Lyme disease 1990     Pain in shoulder 3/16/2010     Seasonal allergies      Tobacco use disorders      Type 2 diabetes, HbA1c goal < 7% (H) 7/18/2013       CC Dr. Daly on close of this encounter.    The following medication was given:     MEDICATION:  Lidocaine with epinephrine 1% [id number]  ROUTE: SQ  SITE: see procedure note  DOSE: 2 cc  LOT #: -[id number]  : Hospira  EXPIRATION DATE: 01-July-2021  NDC#: 7127-7156-09   Was there drug waste?   Multi-dose vial: Yes    Ruby Olmos LPN  February 24, 2021          Again, thank you for allowing me to participate in the care of your patient.        Sincerely,        Jodee Fernandez PA-C    "

## 2021-02-24 NOTE — PATIENT INSTRUCTIONS

## 2021-02-26 LAB — COPATH REPORT: NORMAL

## 2021-03-01 ENCOUNTER — TELEPHONE (OUTPATIENT)
Dept: DERMATOLOGY | Facility: CLINIC | Age: 61
End: 2021-03-01

## 2021-03-01 NOTE — TELEPHONE ENCOUNTER
Patient notified of result.  Her biopsy site is healing well and she has no concerns.  Carmen Cook RN    Rebecca, Anita ESTEVES PA-C sent to [facility] [place]             Please call her (no mychart) and let her know the spot on her cheek was a benign mole, thanks!    Associated Results    Dermatological path order and indications  Order: [id number]  Status:  Final result   Visible to patient:  No (not released) Dx:  Neoplasm of uncertain behavior of skin  Component 5d ago   Copath Report Patient Name: AALIYAH GASCA   MR#: [id number]   Specimen #: [id number]   Collected: 2/24/2021   Received: 2/25/2021   Reported: 2/26/2021 16:39   Ordering Phy(s): ANITA MARTINEZ     For improved result formatting, select 'View Enhanced Report Format' under    Linked Documents section.     SPECIMEN(S):   Skiin, right mid cheek, shave     FINAL DIAGNOSIS:   Skin, right mid cheek, shave:   - Intradermal melanocytic nevus

## 2021-03-17 DIAGNOSIS — K21.9 LPRD (LARYNGOPHARYNGEAL REFLUX DISEASE): ICD-10-CM

## 2021-03-17 NOTE — LETTER
[facility]  [address] [facility] [address]  Phone: [phone]        March 31, 2021      Pily Roche                                                                                                                                [address] [facility] [address]            Dear Ms. Roche,    We are concerned about your health care.  We recently provided you with a medication refill.  Many medications require routine follow-up with your Doctor.      At this time we ask that: You schedule an appointment for your annual physical.    Your prescription: Has been refilled for 90 days so you may have time for the above noted follow-up.      Thank you,      Kath Fierro PA-C/ sarah

## 2021-03-19 RX ORDER — LORATADINE 10 MG/1
TABLET ORAL
Qty: 90 TABLET | Refills: 0 | Status: SHIPPED | OUTPATIENT
Start: 2021-03-19 | End: 2022-02-16

## 2021-03-19 NOTE — TELEPHONE ENCOUNTER
Last seen by Kath Fierro PA-C on 9/30/20 with the following instructions:  Return in about 6 months (around 3/30/2021) for Physical Exam.    No future visit. No My Chart.     Please contact patient to schedule visit.     Scarlett Barone RN BSN  [facility]

## 2021-03-19 NOTE — TELEPHONE ENCOUNTER
Prescription approved per [facility] Refill Protocol.    Scarlett Barone RN BSN  [facility]

## 2021-06-17 NOTE — PROGRESS NOTES
t     SUBJECTIVE:   CC: Pily Roche is an 60 year old woman who presents for preventive health visit.     Patient has been advised of split billing requirements and indicates understanding: Yes     Healthy Habits:    Do you get at least three servings of calcium containing foods daily (dairy, green leafy vegetables, etc.)? yes    Amount of exercise or daily activities, outside of work: 5 day(s) per week    Problems taking medications regularly No    Medication side effects: No    Have you had an eye exam in the past two years? yes    Do you see a dentist twice per year? yes    Do you have sleep apnea, excessive snoring or daytime drowsiness?no    Diabetes Follow-up      How often are you checking your blood sugar? Not at all    What concerns do you have today about your diabetes? None     Do you have any of these symptoms? (Select all that apply)  No numbness or tingling in feet.  No redness, sores or blisters on feet.  No complaints of excessive thirst.  No reports of blurry vision.  No significant changes to weight.        Hyperlipidemia Follow-Up      Are you regularly taking any medication or supplement to lower your cholesterol?   Yes- simvastatin    Are you having muscle aches or other side effects that you think could be caused by your cholesterol lowering medication?  No    Hypertension Follow-up      Do you check your blood pressure regularly outside of the clinic? No     Are you following a low salt diet? No    Are your blood pressures ever more than 140 on the top number (systolic) OR more   than 90 on the bottom number (diastolic), for example 140/90? No    BP Readings from Last 2 Encounters:   06/21/21 138/82   09/30/20 138/80     Hemoglobin A1C (%)   Date Value   06/21/2021 6.8 (H)   09/21/2020 6.7 (H)     LDL Cholesterol Calculated (mg/dL)   Date Value   09/21/2020 105 (H)   06/10/2019 109 (H)       Depression and Anxiety Follow-Up    How are you doing with your depression since your last visit?  Worsened     How are you doing with your anxiety since your last visit?  Worsened     Are you having other symptoms that might be associated with depression or anxiety? No    Have you had a significant life event? OTHER: recent move     Do you have any concerns with your use of alcohol or other drugs? No    Stress levels elevated. She has been out of meds (all of them).   She had a suicidal thought the other day  Social History     Tobacco Use     Smoking status: Current Every Day Smoker     Packs/day: 1.50     Years: 30.00     Pack years: 45.00     Types: Cigarettes     Smokeless tobacco: Never Used     Tobacco comment: 1/2 pack to 1 ppd, has an ecig   Substance Use Topics     Alcohol use: Yes     Alcohol/week: 5.0 - 10.0 standard drinks     Types: 5 - 10 Standard drinks or equivalent per week     Comment: 2beers every few fews     Drug use: Yes     Comment: marijuana     PHQ 6/10/2019 5/11/2020 6/21/2021   PHQ-9 Total Score 14 14 19   Q9: Thoughts of better off dead/self-harm past 2 weeks Several days Not at all More than half the days     GUDELIA-7 SCORE 6/10/2019 5/11/2020 6/21/2021   Total Score - - -   Total Score 10 20 14     Last PHQ-9 6/21/2021   1.  Little interest or pleasure in doing things 3   2.  Feeling down, depressed, or hopeless 3   3.  Trouble falling or staying asleep, or sleeping too much 1   4.  Feeling tired or having little energy 3   5.  Poor appetite or overeating 2   6.  Feeling bad about yourself 2   7.  Trouble concentrating 2   8.  Moving slowly or restless 1   Q9: Thoughts of better off dead/self-harm past 2 weeks 2   PHQ-9 Total Score 19   Difficulty at work, home, or with people Somewhat difficult     GUDELIA-7  6/21/2021   1. Feeling nervous, anxious, or on edge 2   2. Not being able to stop or control worrying 2   3. Worrying too much about different things 2   4. Trouble relaxing 2   5. Being so restless that it is hard to sit still 2   6. Becoming easily annoyed or irritable 2   7.  Feeling afraid, as if something awful might happen 2   GUDELIA-7 Total Score 14   If you checked any problems, how difficult have they made it for you to do your work, take care of things at home, or get along with other people? Somewhat difficult       Suicide Assessment Five-step Evaluation and Treatment (SAFE-T)      COPD Follow-Up    Overall, how are your COPD symptoms since your last clinic visit?  No change    How much fatigue or shortness of breath do you have when you are walking?  Same as usual    How much shortness of breath do you have when you are resting?  Same as usual    How often do you cough? Rarely    Have you noticed any change in your sputum/phlegm?  No    Have you experienced a recent fever? No    Please describe how far you can walk without stopping to rest:  2-5 blocks    How many flights of stairs are you able to walk up without stopping?  None    Have you had any Emergency Room Visits, Urgent Care Visits, or Hospital Admissions because of your COPD since your last office visit?  No    History   Smoking Status     Current Every Day Smoker     Packs/day: 1.50     Years: 30.00     Types: Cigarettes   Smokeless Tobacco     Never Used     Comment: 1/2 pack to 1 ppd, has an ecig     Lab Results   Component Value Date    FEV1 2.09@ 05/21/2008     Chronic Pain Follow-Up    Where in your body do you have pain? Back, shoulders  How has your pain affected your ability to work? Not applicable  Which of these pain treatments have you tried since your last clinic visit? Other: pain medications  How well are you sleeping? Fair  How has your mood been since your last visit? Much worse  Have you had a significant life event? Other: recent move  Other aggravating factors: prolonged sitting and prolonged standing  Taking medication as directed? Yes    PHQ-9 SCORE 6/10/2019 5/11/2020 6/21/2021   PHQ-9 Total Score - - -   PHQ-9 Total Score 14 14 19     GUDELIA-7 SCORE 6/10/2019 5/11/2020 6/21/2021   Total Score - - -    Total Score 10 20 14     No flowsheet data found.  Encounter-Level CSA - 09/19/2016:    Controlled Substance Agreement - Scan on 10/6/2016 12:18 PM: CONTROLLED SUBSTANCE AGREEMENT     Patient-Level CSA:    There are no patient-level csa.         Abuse: Current or Past(Physical, Sexual or Emotional)- No  Do you feel safe in your environment? Yes    Have you ever done Advance Care Planning? (For example, a Health Directive, POLST, or a discussion with a medical provider or your loved ones about your wishes):     Social History     Tobacco Use     Smoking status: Current Every Day Smoker     Packs/day: 1.50     Years: 30.00     Pack years: 45.00     Types: Cigarettes     Smokeless tobacco: Never Used     Tobacco comment: 1/2 pack to 1 ppd, has an ecig   Substance Use Topics     Alcohol use: Yes     Alcohol/week: 5.0 - 10.0 standard drinks     Types: 5 - 10 Standard drinks or equivalent per week     Comment: 2beers every few fews     If you drink alcohol do you typically have >3 drinks per day or >7 drinks per week? No  She has cut back on alcohol.                      Reviewed orders with patient.  Reviewed health maintenance and updated orders accordingly - Yes  BP Readings from Last 3 Encounters:   06/21/21 138/82   09/30/20 138/80   01/08/20 122/70    Wt Readings from Last 3 Encounters:   06/21/21 87.3 kg (192 lb 6.4 oz)   09/30/20 86.2 kg (190 lb)   01/08/20 87.1 kg (192 lb)         FSH-7: No flowsheet data found.    Mammogram Screening: Recommended annual mammography  Pertinent mammograms are reviewed under the imaging tab.    Pertinent mammograms are reviewed under the imaging tab.  History of abnormal Pap smear: NO - age 30-65 PAP every 5 years with negative HPV co-testing recommended     Reviewed and updated as needed this visit by clinical staff  Tobacco  Allergies    Med Hx  Surg Hx  Fam Hx  Soc Hx     Ally DeShong CMA     Reviewed and updated as needed this visit by Provider      Med Hx                ROS:  CONSTITUTIONAL: NEGATIVE for fever, chills, change in weight  INTEGUMENTARY/SKIN: NEGATIVE for worrisome rashes, moles or lesions  EYES: NEGATIVE for vision changes or irritation  ENT: NEGATIVE for ear, mouth and throat problems  RESP: NEGATIVE for significant cough or SOB  BREAST: NEGATIVE for masses, tenderness or discharge  CV: NEGATIVE for chest pain, palpitations or peripheral edema  GI: NEGATIVE for nausea, abdominal pain, heartburn, or change in bowel habits  : NEGATIVE for unusual urinary or vaginal symptoms. No vaginal bleeding.  MUSCULOSKELETAL: NEGATIVE for significant arthralgias or myalgia  NEURO: NEGATIVE for weakness, dizziness or paresthesias  PSYCHIATRIC: NEGATIVE for changes in mood or affect     OBJECTIVE:   /82   Pulse 102   Temp 97.8  F (36.6  C) (Tympanic)   Resp 15   Wt 87.3 kg (192 lb 6.4 oz)   SpO2 96%   Breastfeeding No   BMI 31.53 kg/m    EXAM:  GENERAL APPEARANCE: healthy, alert and no distress  EYES: Eyes grossly normal to inspection, PERRL and conjunctivae and sclerae normal  HENT: ear canals and TM's normal, nose and mouth without ulcers or lesions, oropharynx clear and oral mucous membranes moist  NECK: no adenopathy, no asymmetry, masses, or scars and thyroid normal to palpation  RESP: lungs clear to auscultation - no rales, rhonchi or wheezes  BREAST: normal without masses, tenderness or nipple discharge and no palpable axillary masses or adenopathy  CV: regular rate and rhythm, normal S1 S2, no S3 or S4, no murmur, click or rub, no peripheral edema and peripheral pulses strong  ABDOMEN: soft, nontender, no hepatosplenomegaly, no masses and bowel sounds normal  MS: no musculoskeletal defects are noted and gait is age appropriate without ataxia  SKIN: no suspicious lesions or rashes  NEURO: Normal strength and tone, sensory exam grossly normal, mentation intact and speech normal  PSYCH: mentation appears normal and affect normal/bright    Diagnostic Test  Results:  Labs reviewed in Epic  Results for orders placed or performed in visit on 06/21/21   Vitamin B12     Status: None   Result Value Ref Range    Vitamin B12 457 193 - 986 pg/mL   Hemoglobin A1c     Status: Abnormal   Result Value Ref Range    Hemoglobin A1C 6.8 (H) 0 - 5.6 %   Basic metabolic panel     Status: Abnormal   Result Value Ref Range    Sodium 140 133 - 144 mmol/L    Potassium 5.0 3.4 - 5.3 mmol/L    Chloride 109 94 - 109 mmol/L    Carbon Dioxide 26 20 - 32 mmol/L    Anion Gap 5 3 - 14 mmol/L    Glucose 177 (H) 70 - 99 mg/dL    Urea Nitrogen 13 7 - 30 mg/dL    Creatinine 0.61 0.52 - 1.04 mg/dL    GFR Estimate >90 >60 mL/min/[1.73_m2]    GFR Estimate If Black >90 >60 mL/min/[1.73_m2]    Calcium 9.0 8.5 - 10.1 mg/dL       ASSESSMENT/PLAN:     (Z00.00) Routine general medical examination at a health care facility  (primary encounter diagnosis)  Comment:      HEALTH CARE MAINTENANCE              Reviewed USPTF recommendations and anticipatory guidance.              See orders.     I discussed in detail with Pily Roche the importance of breast cancer screening through monthly self breast exams and annual breast exams in the clinic.   Due for next mammo in Sept 2021     Plan: REVIEW OF HEALTH MAINTENANCE PROTOCOL ORDERS            (J42) Chronic bronchitis, unspecified chronic bronchitis type (H)  Comment:  Does not use inhalers and doing ok. She will notify me if breathing worsens and she wants to go back on dulera    (F17.200) Tobacco use disorder  Comment: still smoking.  Continue on wellbutrin.   Plan: buPROPion (WELLBUTRIN SR) 150 MG 12 hr tablet            (M54.5,  G89.29) Chronic bilateral low back pain without sciatica  Comment: back pain has worsened.      Last MRI on 8/28/2019 reviewed  Impression:   1. Lower lumbar spondylosis with moderate neural foraminal stenosis  bilaterally at L4-5.  2. Active inflammatory arthropathy of the bilateral L4-5 facet joints.  Plan: NEUROSURGERY REFERRAL             (F33.1) Major depressive disorder, recurrent episode, moderate (H)  Comment: She has a lot of stress in her life currently.  She had a passing suicidal thought recently, however states she would never act on those thoughts and currently feels safe in her environment.   She has been off her medication, so will restart   Plan: buPROPion (WELLBUTRIN SR) 150 MG 12 hr tablet,         traZODone (DESYREL) 100 MG tablet            (F41.1) GUDELIA (generalized anxiety disorder)  Comment: has been of medication and anxiety levels have been higher.  Will restart buspar.   Plan: busPIRone (BUSPAR) 10 MG tablet            (E11.22,  N18.2) Type 2 diabetes mellitus with stage 2 chronic kidney disease, without long-term current use of insulin (H)  Comment: sugars are stable.     Plan: blood glucose (NO BRAND SPECIFIED) test strip,         lisinopril (ZESTRIL) 10 MG tablet, metFORMIN         (GLUCOPHAGE-XR) 500 MG 24 hr tablet, FOOT EXAM            (M25.519,  G89.29) Chronic shoulder pain, unspecified laterality  Comment: stable  Plan: diclofenac (VOLTAREN) 75 MG EC tablet,         gabapentin (NEURONTIN) 300 MG capsule,         methocarbamol (ROBAXIN) 500 MG tablet            (K21.9) LPRD (laryngopharyngeal reflux disease)  Comment: stable.   Plan: omeprazole (PRILOSEC) 20 MG DR capsule            (Z11.4) Screening for HIV (human immunodeficiency virus)  Comment: Discussed CDC guidelines on preventative screening for this condition.    Patient would like screening done.     Plan:     (E78.5) Hyperlipidemia LDL goal <100  Comment: doing well on statin.   Plan: simvastatin (ZOCOR) 40 MG tablet            (Z23) High priority for 2019-nCoV vaccine  Comment: Discussed covid-19 vaccine, risks and benefits of vaccination as well as opting to not vaccinate.    I do recommend vaccination, patient agreeable.       Plan: COVID-19,PF,MODERNA            (F15.11) History of methamphetamine abuse (H)  Comment: remains sober from drugs  Plan:  "      Patient has been advised of split billing requirements and indicates understanding: Yes  COUNSELING:   Reviewed preventive health counseling, as reflected in patient instructions       Regular exercise       Healthy diet/nutrition       Vision screening       HIV screeninx in teen years, 1x in adult years, and at intervals if high risk    Estimated body mass index is 31.53 kg/m  as calculated from the following:    Height as of 20: 1.664 m (5' 5.5\").    Weight as of this encounter: 87.3 kg (192 lb 6.4 oz).    Weight management plan: Discussed healthy diet and exercise guidelines    She reports that she has been smoking cigarettes. She has a 45.00 pack-year smoking history. She has never used smokeless tobacco.  Tobacco Cessation Action Plan:   Information offered: Patient not interested at this time      Counseling Resources:  ATP IV Guidelines  Pooled Cohorts Equation Calculator  Breast Cancer Risk Calculator  BRCA-Related Cancer Risk Assessment: FHS-7 Tool  FRAX Risk Assessment  ICSI Preventive Guidelines  Dietary Guidelines for Americans,   USDA's MyPlate  ASA Prophylaxis  Lung CA Screening    [facility]  "

## 2021-06-21 ENCOUNTER — OFFICE VISIT (OUTPATIENT)
Dept: FAMILY MEDICINE | Facility: CLINIC | Age: 61
End: 2021-06-21
Payer: COMMERCIAL

## 2021-06-21 VITALS
TEMPERATURE: 97.8 F | RESPIRATION RATE: 15 BRPM | BODY MASS INDEX: 31.53 KG/M2 | DIASTOLIC BLOOD PRESSURE: 82 MMHG | OXYGEN SATURATION: 96 % | WEIGHT: 192.4 LBS | SYSTOLIC BLOOD PRESSURE: 138 MMHG | HEART RATE: 102 BPM

## 2021-06-21 DIAGNOSIS — Z00.00 ROUTINE GENERAL MEDICAL EXAMINATION AT A HEALTH CARE FACILITY: Primary | ICD-10-CM

## 2021-06-21 DIAGNOSIS — F33.1 MAJOR DEPRESSIVE DISORDER, RECURRENT EPISODE, MODERATE (H): ICD-10-CM

## 2021-06-21 DIAGNOSIS — M54.50 CHRONIC BILATERAL LOW BACK PAIN WITHOUT SCIATICA: ICD-10-CM

## 2021-06-21 DIAGNOSIS — Z23 HIGH PRIORITY FOR 2019-NCOV VACCINE: ICD-10-CM

## 2021-06-21 DIAGNOSIS — E78.5 HYPERLIPIDEMIA LDL GOAL <100: ICD-10-CM

## 2021-06-21 DIAGNOSIS — E11.22 TYPE 2 DIABETES MELLITUS WITH STAGE 2 CHRONIC KIDNEY DISEASE, WITHOUT LONG-TERM CURRENT USE OF INSULIN (H): ICD-10-CM

## 2021-06-21 DIAGNOSIS — F41.1 GAD (GENERALIZED ANXIETY DISORDER): ICD-10-CM

## 2021-06-21 DIAGNOSIS — Z11.4 SCREENING FOR HIV (HUMAN IMMUNODEFICIENCY VIRUS): ICD-10-CM

## 2021-06-21 DIAGNOSIS — N18.2 TYPE 2 DIABETES MELLITUS WITH STAGE 2 CHRONIC KIDNEY DISEASE, WITHOUT LONG-TERM CURRENT USE OF INSULIN (H): ICD-10-CM

## 2021-06-21 DIAGNOSIS — F17.200 TOBACCO USE DISORDER: ICD-10-CM

## 2021-06-21 DIAGNOSIS — G89.29 CHRONIC BILATERAL LOW BACK PAIN WITHOUT SCIATICA: ICD-10-CM

## 2021-06-21 DIAGNOSIS — F15.11 HISTORY OF METHAMPHETAMINE ABUSE (H): ICD-10-CM

## 2021-06-21 DIAGNOSIS — K21.9 LPRD (LARYNGOPHARYNGEAL REFLUX DISEASE): ICD-10-CM

## 2021-06-21 DIAGNOSIS — J42 CHRONIC BRONCHITIS, UNSPECIFIED CHRONIC BRONCHITIS TYPE (H): ICD-10-CM

## 2021-06-21 DIAGNOSIS — M25.519 CHRONIC SHOULDER PAIN, UNSPECIFIED LATERALITY: ICD-10-CM

## 2021-06-21 DIAGNOSIS — N18.2 TYPE 2 DIABETES MELLITUS WITH STAGE 2 CHRONIC KIDNEY DISEASE, WITHOUT LONG-TERM CURRENT USE OF INSULIN (H): Primary | ICD-10-CM

## 2021-06-21 DIAGNOSIS — G89.29 CHRONIC SHOULDER PAIN, UNSPECIFIED LATERALITY: ICD-10-CM

## 2021-06-21 DIAGNOSIS — E11.22 TYPE 2 DIABETES MELLITUS WITH STAGE 2 CHRONIC KIDNEY DISEASE, WITHOUT LONG-TERM CURRENT USE OF INSULIN (H): Primary | ICD-10-CM

## 2021-06-21 LAB
ANION GAP SERPL CALCULATED.3IONS-SCNC: 5 MMOL/L (ref 3–14)
BUN SERPL-MCNC: 13 MG/DL (ref 7–30)
CALCIUM SERPL-MCNC: 9 MG/DL (ref 8.5–10.1)
CHLORIDE SERPL-SCNC: 109 MMOL/L (ref 94–109)
CO2 SERPL-SCNC: 26 MMOL/L (ref 20–32)
CREAT SERPL-MCNC: 0.61 MG/DL (ref 0.52–1.04)
GFR SERPL CREATININE-BSD FRML MDRD: >90 ML/MIN/{1.73_M2}
GLUCOSE SERPL-MCNC: 177 MG/DL (ref 70–99)
HBA1C MFR BLD: 6.8 % (ref 0–5.6)
POTASSIUM SERPL-SCNC: 5 MMOL/L (ref 3.4–5.3)
SODIUM SERPL-SCNC: 140 MMOL/L (ref 133–144)
VIT B12 SERPL-MCNC: 457 PG/ML (ref 193–986)

## 2021-06-21 PROCEDURE — 0011A COVID-19,PF,MODERNA: CPT | Performed by: PHYSICIAN ASSISTANT

## 2021-06-21 PROCEDURE — 36415 COLL VENOUS BLD VENIPUNCTURE: CPT | Performed by: PHYSICIAN ASSISTANT

## 2021-06-21 PROCEDURE — 91301 COVID-19,PF,MODERNA: CPT | Performed by: PHYSICIAN ASSISTANT

## 2021-06-21 PROCEDURE — 80048 BASIC METABOLIC PNL TOTAL CA: CPT | Performed by: PHYSICIAN ASSISTANT

## 2021-06-21 PROCEDURE — 99214 OFFICE O/P EST MOD 30 MIN: CPT | Mod: 25 | Performed by: PHYSICIAN ASSISTANT

## 2021-06-21 PROCEDURE — 99207 PR FOOT EXAM NO CHARGE: CPT | Mod: 25 | Performed by: PHYSICIAN ASSISTANT

## 2021-06-21 PROCEDURE — 99396 PREV VISIT EST AGE 40-64: CPT | Mod: 25 | Performed by: PHYSICIAN ASSISTANT

## 2021-06-21 PROCEDURE — 83036 HEMOGLOBIN GLYCOSYLATED A1C: CPT | Performed by: PHYSICIAN ASSISTANT

## 2021-06-21 PROCEDURE — 82607 VITAMIN B-12: CPT | Performed by: PHYSICIAN ASSISTANT

## 2021-06-21 RX ORDER — BUPROPION HYDROCHLORIDE 150 MG/1
150 TABLET, EXTENDED RELEASE ORAL 2 TIMES DAILY
Qty: 180 TABLET | Refills: 1 | Status: SHIPPED | OUTPATIENT
Start: 2021-06-21 | End: 2022-05-10

## 2021-06-21 RX ORDER — TRAZODONE HYDROCHLORIDE 100 MG/1
TABLET ORAL
Qty: 180 TABLET | Refills: 1 | Status: SHIPPED | OUTPATIENT
Start: 2021-06-21 | End: 2021-12-31

## 2021-06-21 RX ORDER — LISINOPRIL 10 MG/1
10 TABLET ORAL DAILY
Qty: 90 TABLET | Refills: 1 | Status: SHIPPED | OUTPATIENT
Start: 2021-06-21 | End: 2022-01-07

## 2021-06-21 RX ORDER — BUSPIRONE HYDROCHLORIDE 10 MG/1
20 TABLET ORAL 2 TIMES DAILY
Qty: 360 TABLET | Refills: 1 | Status: SHIPPED | OUTPATIENT
Start: 2021-06-21 | End: 2022-04-08

## 2021-06-21 RX ORDER — GABAPENTIN 300 MG/1
CAPSULE ORAL
Qty: 810 CAPSULE | Refills: 1 | Status: SHIPPED | OUTPATIENT
Start: 2021-06-21 | End: 2022-05-10

## 2021-06-21 RX ORDER — SIMVASTATIN 40 MG
40 TABLET ORAL AT BEDTIME
Qty: 90 TABLET | Refills: 1 | Status: SHIPPED | OUTPATIENT
Start: 2021-06-21 | End: 2022-05-11

## 2021-06-21 RX ORDER — DICLOFENAC SODIUM 75 MG/1
TABLET, DELAYED RELEASE ORAL
Qty: 180 TABLET | Refills: 1 | Status: ON HOLD | OUTPATIENT
Start: 2021-06-21 | End: 2021-09-14

## 2021-06-21 RX ORDER — METHOCARBAMOL 500 MG/1
TABLET, FILM COATED ORAL
Qty: 180 TABLET | Refills: 1 | Status: SHIPPED | OUTPATIENT
Start: 2021-06-21 | End: 2021-11-24

## 2021-06-21 RX ORDER — METFORMIN HCL 500 MG
2000 TABLET, EXTENDED RELEASE 24 HR ORAL
Qty: 360 TABLET | Refills: 1 | Status: SHIPPED | OUTPATIENT
Start: 2021-06-21 | End: 2021-12-31

## 2021-06-21 ASSESSMENT — ANXIETY QUESTIONNAIRES
IF YOU CHECKED OFF ANY PROBLEMS ON THIS QUESTIONNAIRE, HOW DIFFICULT HAVE THESE PROBLEMS MADE IT FOR YOU TO DO YOUR WORK, TAKE CARE OF THINGS AT HOME, OR GET ALONG WITH OTHER PEOPLE: SOMEWHAT DIFFICULT
1. FEELING NERVOUS, ANXIOUS, OR ON EDGE: MORE THAN HALF THE DAYS
6. BECOMING EASILY ANNOYED OR IRRITABLE: MORE THAN HALF THE DAYS
3. WORRYING TOO MUCH ABOUT DIFFERENT THINGS: MORE THAN HALF THE DAYS
5. BEING SO RESTLESS THAT IT IS HARD TO SIT STILL: MORE THAN HALF THE DAYS
GAD7 TOTAL SCORE: 14
7. FEELING AFRAID AS IF SOMETHING AWFUL MIGHT HAPPEN: MORE THAN HALF THE DAYS
2. NOT BEING ABLE TO STOP OR CONTROL WORRYING: MORE THAN HALF THE DAYS

## 2021-06-21 ASSESSMENT — PATIENT HEALTH QUESTIONNAIRE - PHQ9
SUM OF ALL RESPONSES TO PHQ QUESTIONS 1-9: 19
5. POOR APPETITE OR OVEREATING: MORE THAN HALF THE DAYS

## 2021-06-22 ASSESSMENT — ANXIETY QUESTIONNAIRES: GAD7 TOTAL SCORE: 14

## 2021-06-28 ENCOUNTER — TELEPHONE (OUTPATIENT)
Dept: NEUROSURGERY | Facility: OTHER | Age: 61
End: 2021-06-28

## 2021-07-20 ENCOUNTER — TELEPHONE (OUTPATIENT)
Dept: FAMILY MEDICINE | Facility: CLINIC | Age: 61
End: 2021-07-20

## 2021-07-20 ENCOUNTER — MEDICAL CORRESPONDENCE (OUTPATIENT)
Dept: FAMILY MEDICINE | Facility: CLINIC | Age: 61
End: 2021-07-20

## 2021-07-20 ENCOUNTER — IMMUNIZATION (OUTPATIENT)
Dept: NURSING | Facility: CLINIC | Age: 61
End: 2021-07-20
Attending: PHYSICIAN ASSISTANT
Payer: COMMERCIAL

## 2021-07-20 PROCEDURE — 0012A PR COVID VAC MODERNA 100 MCG/0.5 ML IM: CPT

## 2021-07-20 PROCEDURE — 91301 PR COVID VAC MODERNA 100 MCG/0.5 ML IM: CPT

## 2021-07-20 NOTE — TELEPHONE ENCOUNTER
Update from patient while in-clinic receiving her 2nd moderna vaccine: She is feeling much better now that she is back on medications. Glucose of 105 before bed if she does not have a snack before bed.    Dania Bellamy CMA

## 2021-07-22 ENCOUNTER — OFFICE VISIT (OUTPATIENT)
Dept: NEUROSURGERY | Facility: CLINIC | Age: 61
End: 2021-07-22
Attending: PHYSICIAN ASSISTANT
Payer: COMMERCIAL

## 2021-07-22 VITALS — WEIGHT: 188.2 LBS | BODY MASS INDEX: 30.84 KG/M2

## 2021-07-22 DIAGNOSIS — M54.16 LUMBAR RADICULOPATHY: Primary | ICD-10-CM

## 2021-07-22 PROCEDURE — 99213 OFFICE O/P EST LOW 20 MIN: CPT | Performed by: NURSE PRACTITIONER

## 2021-07-22 ASSESSMENT — PAIN SCALES - GENERAL: PAINLEVEL: EXTREME PAIN (8)

## 2021-07-22 NOTE — LETTER
"    7/22/2021         RE: Pily Roche  [address] [facility] [address]        Dear Colleague,    Thank you for referring your patient, Pily Roche, to the [facility]. Please see a copy of my visit note below.    Dr. Ian Lomeli   [facility] Neurosurgery Clinic Visit      CC: low back and leg pain    Primary care Provider: Kath Fierro    Reason For Visit:   I was asked by Kath Fierro PA-C to consult on the patient for: M54.5, G89.29 (ICD-10-CM) - Chronic bilateral low back pain without sciatica      HPI: Pily Roche is a 60 year old female with history of right L5-S1 microdiscectomy x2 at [facility] in 1994 and 1998, presents for evaluation of increased low back and leg pain. Last office visit with our clinic was in 2019. Patient has tried multiple injections, most recently L4-5 TRIPP in 2019 at [facility]. Today, patient reports pain is located in the bilateral low back and radiates to posterior right thigh to calf. Denies any paresthesias or weakness. Denies any left leg symptoms. Pain is worsened with daily activities, standing, housework. Pain is alleviated with rest, Advil, ice/heat. She also tried PT and pool therapy in the past. She is able to walk a short distance but then has to stop and rest due to pain. Denies any falls or foot drop. Follows with Urology for chronic urinary incontinence, s/p urethral sling procedure in 2008.     Current pain: 6/10   At worst: 9/10    Past Medical History:   Diagnosis Date     Advanced directives, counseling/discussion 2/26/2013    Patient does not have an Advance/Health Care Directive (HCD), given \"What is Advance Care Planning?\" flyer.  Maureen Iglesias February 26, 2013      Arthritis      Disorders of bursae and tendons in shoulder region, unspecified 6/8/2010     Dysfunctional uterine bleeding      History of substance abuse (H) 2006    cocaine, completed rehab     Hyperlipidemia  "     Insomnia      Lyme disease 1990     Pain in shoulder 3/16/2010     Seasonal allergies      Tobacco use disorders      Type 2 diabetes, HbA1c goal < 7% (H) 7/18/2013       Past Medical History reviewed with patient during visit.    Past Surgical History:   Procedure Laterality Date     C SHOULDER SURG PROC UNLISTED  1992    X 3 (broken clavicle and rotator cuff tear with impingement     HC SACROPLASTY  1990's    Herniated L4-L5 X 2     HYSTERECTOMY, PAP NO LONGER INDICATED  2005     HYSTERECTOMY, VAGINAL  6/28/05    Ovaries in Place     Past Surgical History reviewed with patient during visit.    Current Outpatient Medications   Medication     albuterol (VENTOLIN HFA) 108 (90 Base) MCG/ACT inhaler     alcohol swab prep pads     aspirin 81 MG tablet     blood glucose (NO BRAND SPECIFIED) test strip     blood glucose monitoring (NO BRAND SPECIFIED) meter device kit     buPROPion (WELLBUTRIN SR) 150 MG 12 hr tablet     busPIRone (BUSPAR) 10 MG tablet     diclofenac (VOLTAREN) 75 MG EC tablet     fluticasone (FLONASE) 50 MCG/ACT nasal spray     gabapentin (NEURONTIN) 300 MG capsule     loratadine (CLARITIN) 10 MG tablet     metFORMIN (GLUCOPHAGE-XR) 500 MG 24 hr tablet     methocarbamol (ROBAXIN) 500 MG tablet     mometasone-formoterol (DULERA) 100-5 MCG/ACT inhaler     omeprazole (PRILOSEC) 20 MG DR capsule     polyethylene glycol-propylene glycol (SYSTANE ULTRA) 0.4-0.3 % SOLN ophthalmic solution     simvastatin (ZOCOR) 40 MG tablet     traZODone (DESYREL) 100 MG tablet     lisinopril (ZESTRIL) 10 MG tablet     order for DME     Current Facility-Administered Medications   Medication     lidocaine 1% with EPINEPHrine 1:100,000 injection 3 mL       Allergies   Allergen Reactions     Quetiapine Anaphylaxis     Seroquel [Quetiapine Fumarate]      Insomnia  , anhedonia       Ambien [Zolpidem Tartrate] Difficulty breathing     Reaction after taking 5 mg on Wed, Thurs, then drank Friday afternoon and developed symptoms,   Irrability.       Chantix [Varenicline]      Zolpidem Unknown       Social History     Socioeconomic History     Marital status:      Spouse name: Not on file     Number of children: Not on file     Years of education: Not on file     Highest education level: Not on file   Occupational History     Not on file   Tobacco Use     Smoking status: Current Every Day Smoker     Packs/day: 1.50     Years: 30.00     Pack years: 45.00     Types: Cigarettes     Smokeless tobacco: Never Used     Tobacco comment: 1/2 pack to 1 ppd, has an ecig   Substance and Sexual Activity     Alcohol use: Yes     Alcohol/week: 5.0 - 10.0 standard drinks     Types: 5 - 10 Standard drinks or equivalent per week     Comment: 2beers every few fews     Drug use: Yes     Comment: marijuana     Sexual activity: Yes     Partners: Male   Other Topics Concern     Parent/sibling w/ CABG, MI or angioplasty before 65F 55M? Yes      Service No     Blood Transfusions No     Caffeine Concern No     Occupational Exposure No     Hobby Hazards No     Sleep Concern Yes     Stress Concern Yes     Weight Concern Yes     Special Diet No     Back Care Yes     Exercise Yes     Bike Helmet No     Seat Belt Yes     Self-Exams No   Social History Narrative     Not on file     Social Determinants of Health     Financial Resource Strain:      Difficulty of Paying Living Expenses:    Food Insecurity:      Worried About Running Out of Food in the Last Year:      Ran Out of Food in the Last Year:    Transportation Needs:      Lack of Transportation (Medical):      Lack of Transportation (Non-Medical):    Physical Activity:      Days of Exercise per Week:      Minutes of Exercise per Session:    Stress:      Feeling of Stress :    Social Connections:      Frequency of Communication with Friends and Family:      Frequency of Social Gatherings with Friends and Family:      Attends Yazidism Services:      Active Member of Clubs or Organizations:      Attends Club  or Organization Meetings:      Marital Status:    Intimate Partner Violence:      Fear of Current or Ex-Partner:      Emotionally Abused:      Physically Abused:      Sexually Abused:        Family History   Problem Relation Age of Onset     Diabetes Mother      Hypertension Mother      Gynecology Mother      Arthritis Mother      Thyroid Disease Mother      Allergies Mother      Lipids Father      Heart Disease Father      C.A.D. Father      Macular Degeneration Father      Hypertension Maternal Grandmother      Arthritis Maternal Grandmother      Cancer Maternal Grandmother      Cancer Maternal Grandfather      C.A.D. Maternal Grandfather      Asthma Paternal Grandmother      C.A.D. Paternal Grandmother      Cerebrovascular Disease Paternal Grandfather      Alzheimer Disease Paternal Grandfather      Arthritis Paternal Grandfather      C.A.D. Paternal Grandfather      Cardiovascular Paternal Grandfather      Circulatory Paternal Grandfather      Glaucoma No family hx of        ROS: 10 point ROS neg other than the symptoms noted above in the HPI.    Vital Signs: Wt 188 lb 3.2 oz (85.4 kg)   BMI 30.84 kg/m      Examination:  Constitutional:  Alert, well nourished, NAD.  HEENT: Normocephalic, atraumatic.   Pulmonary:  Without shortness of breath, normal effort.   Lymph: No lymphadenopathy to low back or LE.   Integumentary: Skin is free of rashes or lesions.   Cardiovascular:  No pitting edema of BLE.      Neurological:  Awake  Alert  Oriented x 3  Speech clear  Cranial nerves II - XII grossly intact  PERRL  EOMI  Face symmetric  Tongue midline  Motor exam   Hip Flexor:                 Right: 5/5  Left:  5/5  Quadriceps:               Right:  5/5  Left:  5/5  Hamstrings:               Right:  5/5  Left:  5/5  Gastroc Soleus:         Right:  5/5  Left:  5/5  Tib/Ant:                      Right:  5/5  Left:  5/5  EHL:                          Right:  5/5  Left:  5/5         Sensation normal to bilateral upper and  lower extremities.    Reflexes are 2+ in the patellar and Achilles. There is no clonus. Downgoing Babinski.    Musculoskeletal:  Gait: Able to stand from a seated position. Normal non-antalgic, non-myelopathic gait. Able to heel/toe walk without loss of balance.    Lumbar examination reveals no tenderness of the spine or paraspinous muscles.  Hip height is symmetrical. Negative SI joint, sciatic notch or greater trochanteric tenderness to palpation bilaterally.  Straight leg raise is negative bilaterally.      Imaging:   MR LUMBAR SPINE W/O CONTRAST 8/28/2019 7:41 AM  Impression:   1. Lower lumbar spondylosis with moderate neural foraminal stenosis  bilaterally at L4-5.  2. Active inflammatory arthropathy of the bilateral L4-5 facet joints.    Assessment/Plan:   Chronic low back pain  Right leg pain  History of right L5-S1 microdiscectomy    60 year old female with history of right L5-S1 microdiscectomy, bilateral low back pain that radiates to posterior right thigh to calf. Denies any paresthesias or weakness. MRI from 2019 reviewed and we discussed options at this time. Patient states her pain has worsened since 2019 and she is wondering if she would be a surgical candidate since she has not had lasting relief with conservative treatments. Recommend an updated lumbar spine MRI for further evaluation. I will call with results and next steps.     Advised patient to call our clinic with any questions or concerns. Discussed red flag symptoms and advised to seek medical attention if these develop. Patient voiced understanding and agreement.      Claire Diamond CNP  [facility]  [address] [address]  Tel [phone]  Pager [phone]          Again, thank you for allowing me to participate in the care of your patient.        Sincerely,        Claire Diamond, SARA

## 2021-07-22 NOTE — NURSING NOTE
"July 22, 2021 9:13 AM   Pily Roche is a 60 year old female who presents for:    Chief Complaint   Patient presents with     Consult     Bilateral LBP     Initial Vitals: Wt 188 lb 3.2 oz (85.4 kg)   BMI 30.84 kg/m   Estimated body mass index is 30.84 kg/m  as calculated from the following:    Height as of 1/8/20: 5' 5.5\" (1.664 m).    Weight as of this encounter: 188 lb 3.2 oz (85.4 kg). Body surface area is 1.99 meters squared.  Extreme Pain (8) Comment: Data Unavailable       Clinical concerns: Bilateral LBP  Myron Truong, ATC    "

## 2021-07-22 NOTE — PROGRESS NOTES
"Dr. Ian Lomeli   [facility] Neurosurgery Clinic Visit      CC: low back and leg pain    Primary care Provider: Kath Fierro    Reason For Visit:   I was asked by Kath Fierro PA-C to consult on the patient for: M54.5, G89.29 (ICD-10-CM) - Chronic bilateral low back pain without sciatica      HPI: Pily Roche is a 60 year old female with history of right L5-S1 microdiscectomy x2 at [facility] in 1994 and 1998, presents for evaluation of increased low back and leg pain. Last office visit with our clinic was in 2019. Patient has tried multiple injections, most recently L4-5 TRIPP in 2019 at [facility]. Today, patient reports pain is located in the bilateral low back and radiates to posterior right thigh to calf. Denies any paresthesias or weakness. Denies any left leg symptoms. Pain is worsened with daily activities, standing, housework. Pain is alleviated with rest, Advil, ice/heat. She also tried PT and pool therapy in the past. She is able to walk a short distance but then has to stop and rest due to pain. Denies any falls or foot drop. Follows with Urology for chronic urinary incontinence, s/p urethral sling procedure in 2008.     Current pain: 6/10   At worst: 9/10    Past Medical History:   Diagnosis Date     Advanced directives, counseling/discussion 2/26/2013    Patient does not have an Advance/Health Care Directive (HCD), given \"What is Advance Care Planning?\" flyer.  Maureen Iglesias February 26, 2013      Arthritis      Disorders of bursae and tendons in shoulder region, unspecified 6/8/2010     Dysfunctional uterine bleeding      History of substance abuse (H) 2006    cocaine, completed rehab     Hyperlipidemia      Insomnia      Lyme disease 1990     Pain in shoulder 3/16/2010     Seasonal allergies      Tobacco use disorders      Type 2 diabetes, HbA1c goal < 7% (H) 7/18/2013       Past Medical History reviewed with patient during visit.    Past Surgical History:   Procedure " Laterality Date     C SHOULDER SURG PROC UNLISTED  1992    X 3 (broken clavicle and rotator cuff tear with impingement     HC SACROPLASTY  1990's    Herniated L4-L5 X 2     HYSTERECTOMY, PAP NO LONGER INDICATED  2005     HYSTERECTOMY, VAGINAL  6/28/05    Ovaries in Place     Past Surgical History reviewed with patient during visit.    Current Outpatient Medications   Medication     albuterol (VENTOLIN HFA) 108 (90 Base) MCG/ACT inhaler     alcohol swab prep pads     aspirin 81 MG tablet     blood glucose (NO BRAND SPECIFIED) test strip     blood glucose monitoring (NO BRAND SPECIFIED) meter device kit     buPROPion (WELLBUTRIN SR) 150 MG 12 hr tablet     busPIRone (BUSPAR) 10 MG tablet     diclofenac (VOLTAREN) 75 MG EC tablet     fluticasone (FLONASE) 50 MCG/ACT nasal spray     gabapentin (NEURONTIN) 300 MG capsule     loratadine (CLARITIN) 10 MG tablet     metFORMIN (GLUCOPHAGE-XR) 500 MG 24 hr tablet     methocarbamol (ROBAXIN) 500 MG tablet     mometasone-formoterol (DULERA) 100-5 MCG/ACT inhaler     omeprazole (PRILOSEC) 20 MG DR capsule     polyethylene glycol-propylene glycol (SYSTANE ULTRA) 0.4-0.3 % SOLN ophthalmic solution     simvastatin (ZOCOR) 40 MG tablet     traZODone (DESYREL) 100 MG tablet     lisinopril (ZESTRIL) 10 MG tablet     order for DME     Current Facility-Administered Medications   Medication     lidocaine 1% with EPINEPHrine 1:100,000 injection 3 mL       Allergies   Allergen Reactions     Quetiapine Anaphylaxis     Seroquel [Quetiapine Fumarate]      Insomnia  , anhedonia       Ambien [Zolpidem Tartrate] Difficulty breathing     Reaction after taking 5 mg on Wed, Thurs, then drank Friday afternoon and developed symptoms,  Irrability.       Chantix [Varenicline]      Zolpidem Unknown       Social History     Socioeconomic History     Marital status:      Spouse name: Not on file     Number of children: Not on file     Years of education: Not on file     Highest education level: Not  on file   Occupational History     Not on file   Tobacco Use     Smoking status: Current Every Day Smoker     Packs/day: 1.50     Years: 30.00     Pack years: 45.00     Types: Cigarettes     Smokeless tobacco: Never Used     Tobacco comment: 1/2 pack to 1 ppd, has an ecig   Substance and Sexual Activity     Alcohol use: Yes     Alcohol/week: 5.0 - 10.0 standard drinks     Types: 5 - 10 Standard drinks or equivalent per week     Comment: 2beers every few fews     Drug use: Yes     Comment: marijuana     Sexual activity: Yes     Partners: Male   Other Topics Concern     Parent/sibling w/ CABG, MI or angioplasty before 65F 55M? Yes      Service No     Blood Transfusions No     Caffeine Concern No     Occupational Exposure No     Hobby Hazards No     Sleep Concern Yes     Stress Concern Yes     Weight Concern Yes     Special Diet No     Back Care Yes     Exercise Yes     Bike Helmet No     Seat Belt Yes     Self-Exams No   Social History Narrative     Not on file     Social Determinants of Health     Financial Resource Strain:      Difficulty of Paying Living Expenses:    Food Insecurity:      Worried About Running Out of Food in the Last Year:      Ran Out of Food in the Last Year:    Transportation Needs:      Lack of Transportation (Medical):      Lack of Transportation (Non-Medical):    Physical Activity:      Days of Exercise per Week:      Minutes of Exercise per Session:    Stress:      Feeling of Stress :    Social Connections:      Frequency of Communication with Friends and Family:      Frequency of Social Gatherings with Friends and Family:      Attends Rastafarian Services:      Active Member of Clubs or Organizations:      Attends Club or Organization Meetings:      Marital Status:    Intimate Partner Violence:      Fear of Current or Ex-Partner:      Emotionally Abused:      Physically Abused:      Sexually Abused:        Family History   Problem Relation Age of Onset     Diabetes Mother       Hypertension Mother      Gynecology Mother      Arthritis Mother      Thyroid Disease Mother      Allergies Mother      Lipids Father      Heart Disease Father      C.A.D. Father      Macular Degeneration Father      Hypertension Maternal Grandmother      Arthritis Maternal Grandmother      Cancer Maternal Grandmother      Cancer Maternal Grandfather      C.A.D. Maternal Grandfather      Asthma Paternal Grandmother      C.A.D. Paternal Grandmother      Cerebrovascular Disease Paternal Grandfather      Alzheimer Disease Paternal Grandfather      Arthritis Paternal Grandfather      C.A.D. Paternal Grandfather      Cardiovascular Paternal Grandfather      Circulatory Paternal Grandfather      Glaucoma No family hx of        ROS: 10 point ROS neg other than the symptoms noted above in the HPI.    Vital Signs: Wt 188 lb 3.2 oz (85.4 kg)   BMI 30.84 kg/m      Examination:  Constitutional:  Alert, well nourished, NAD.  HEENT: Normocephalic, atraumatic.   Pulmonary:  Without shortness of breath, normal effort.   Lymph: No lymphadenopathy to low back or LE.   Integumentary: Skin is free of rashes or lesions.   Cardiovascular:  No pitting edema of BLE.      Neurological:  Awake  Alert  Oriented x 3  Speech clear  Cranial nerves II - XII grossly intact  PERRL  EOMI  Face symmetric  Tongue midline  Motor exam   Hip Flexor:                 Right: 5/5  Left:  5/5  Quadriceps:               Right:  5/5  Left:  5/5  Hamstrings:               Right:  5/5  Left:  5/5  Gastroc Soleus:         Right:  5/5  Left:  5/5  Tib/Ant:                      Right:  5/5  Left:  5/5  EHL:                          Right:  5/5  Left:  5/5         Sensation normal to bilateral upper and lower extremities.    Reflexes are 2+ in the patellar and Achilles. There is no clonus. Downgoing Babinski.    Musculoskeletal:  Gait: Able to stand from a seated position. Normal non-antalgic, non-myelopathic gait. Able to heel/toe walk without loss of  balance.    Lumbar examination reveals no tenderness of the spine or paraspinous muscles.  Hip height is symmetrical. Negative SI joint, sciatic notch or greater trochanteric tenderness to palpation bilaterally.  Straight leg raise is negative bilaterally.      Imaging:   MR LUMBAR SPINE W/O CONTRAST 8/28/2019 7:41 AM  Impression:   1. Lower lumbar spondylosis with moderate neural foraminal stenosis  bilaterally at L4-5.  2. Active inflammatory arthropathy of the bilateral L4-5 facet joints.    Assessment/Plan:   Chronic low back pain  Right leg pain  History of right L5-S1 microdiscectomy    60 year old female with history of right L5-S1 microdiscectomy, bilateral low back pain that radiates to posterior right thigh to calf. Denies any paresthesias or weakness. MRI from 2019 reviewed and we discussed options at this time. Patient states her pain has worsened since 2019 and she is wondering if she would be a surgical candidate since she has not had lasting relief with conservative treatments. Recommend an updated lumbar spine MRI for further evaluation. I will call with results and next steps.     Advised patient to call our clinic with any questions or concerns. Discussed red flag symptoms and advised to seek medical attention if these develop. Patient voiced understanding and agreement.      Claire Diamond [facility]  [address] [address]  Tel [phone]  Pager [phone]

## 2021-07-22 NOTE — PATIENT INSTRUCTIONS
Order for lumbar spine MRI. I will call with the results and next steps.     Please call our clinic if symptoms persist, change, or worsen at any time.    Claire Diamond CNP  [facility] Neurosurgery  [address] [address]  Tel [phone]  Pager [phone]

## 2021-07-29 ENCOUNTER — ANCILLARY PROCEDURE (OUTPATIENT)
Dept: MRI IMAGING | Facility: CLINIC | Age: 61
End: 2021-07-29
Attending: NURSE PRACTITIONER
Payer: COMMERCIAL

## 2021-07-29 DIAGNOSIS — M54.16 LUMBAR RADICULOPATHY: ICD-10-CM

## 2021-07-29 PROCEDURE — 72148 MRI LUMBAR SPINE W/O DYE: CPT | Performed by: RADIOLOGY

## 2021-08-03 ENCOUNTER — TELEPHONE (OUTPATIENT)
Dept: NEUROSURGERY | Facility: CLINIC | Age: 61
End: 2021-08-03

## 2021-08-03 NOTE — TELEPHONE ENCOUNTER
Reviewed recent MRI of lumbar spine. Shows moderate narrowing of the bilateral lateral recess at L4-5 with abutment of the bilateral L5 nerve roots, mildly progressed since previous scan.     Discussed with patient. She reports continued low back pain radiating to left buttocks and down posterior right thigh to calf. She has tried multiple injections with [facility], most recently in 2019. Also did PT and pool therapy. Patient states she is not interested in further injections and she would like to follow-up with Dr. Lomeli in clinic to discuss next options. She saw Dr. Lomeli in 2019 as well.     Sent message to scheduling team to coordinate appt.     Advised patient to call our clinic if symptoms persist, change, or worsen. Patient voiced understanding and agreement with this plan.      Claire Diamond, [facility]surgery  [address] [address]  Tel [phone]  Pager [phone]

## 2021-08-04 NOTE — TELEPHONE ENCOUNTER
Called [facility] to request all injection records to be sent to us.

## 2021-08-19 ENCOUNTER — OFFICE VISIT (OUTPATIENT)
Dept: NEUROSURGERY | Facility: CLINIC | Age: 61
End: 2021-08-19
Payer: COMMERCIAL

## 2021-08-19 VITALS
DIASTOLIC BLOOD PRESSURE: 91 MMHG | HEART RATE: 116 BPM | TEMPERATURE: 98.5 F | OXYGEN SATURATION: 95 % | SYSTOLIC BLOOD PRESSURE: 129 MMHG

## 2021-08-19 DIAGNOSIS — M54.16 LUMBAR RADICULOPATHY: ICD-10-CM

## 2021-08-19 DIAGNOSIS — Z01.818 PRE-OP TESTING: Primary | ICD-10-CM

## 2021-08-19 DIAGNOSIS — M54.16 LUMBAR RADICULOPATHY: Primary | ICD-10-CM

## 2021-08-19 DIAGNOSIS — Z11.59 ENCOUNTER FOR SCREENING FOR OTHER VIRAL DISEASES: ICD-10-CM

## 2021-08-19 PROCEDURE — 99214 OFFICE O/P EST MOD 30 MIN: CPT | Performed by: NEUROLOGICAL SURGERY

## 2021-08-19 ASSESSMENT — PAIN SCALES - GENERAL: PAINLEVEL: MODERATE PAIN (5)

## 2021-08-19 NOTE — PATIENT INSTRUCTIONS
Patient Instructions    Surgery scheduled at [facility] for Lumbar discectomy with Dr. Lomeli    Pre-Operative    Surgical risks: blood clots in the leg or lung, problems urinating, nerve damage, drainage from the incision, infection,stiffness    Pre-operative physical with primary care physician within 30 days of surgical date.     Likely same day procedure with discharge home day of surgery, may stay for 23 hour observation hospitalization for monitoring.       Shower procedure  o Please shower with antimicrobial soap the night before surgery and morning of surgery. Please refer to showering instruction sheet in folder.    Eating/Drinking  o Stop all solid foods 8 hours before surgery.  o Keep drinking clear liquids until 4 hours before surgery  - Clear liquids include water, clear juice, black coffee, or clear tea without milk, Gatorade, clear soda.     Medications  o Hold Aspirin, NSAIDs (Advil/Ibuprofen, Indocin, Naproxen,Nuprin,Relafen/Nabumetone, Diclofenac,Meloxicam, Aleve, Celebrex) x 7 days prior to surgical date  o You can take Tylenol (Acetaminophen) for pain, 1000 mg  - Do not exceed 3,000 mg per day   o Any other medications prescribed, please discuss with your primary care provider at your pre-operative physical     As part of preparation for your upcoming procedure you are required to have a test for the novel Coronavirus, COVID-19  o Please call the drive-up testing number to schedule your appointment. The test needs to be completed within 4 days (96) hours of surgery.   o Scheduling Number: [phone]     Post-Operative    Pain Management    Dealing with pain  o As your body heals, you might feel a stabbing, burning, or aching pain. You may also have some numbness.  o Everyone feels pain differently, we may ask you to rate your pain using a pain scale. This will let us know how much pain you feel.   o Keep in mind that medicine won't take away all of your pain. It helps to try  other ways to relax and ease pain.     Things to help with pain  o After surgery, we will give you medicine for your pain. These medications work well, but they can make you drowsy, itchy, or sick to your stomach. If we give you narcotics for pain, try to take the pills with food.   o For mild to moderate pain, you can take medication such as Tylenol. These can be used with narcotics, but make sure that your narcotic does not contain Tylenol.   - Do NOT drive while taking narcotic pain medication  - Do NOT drink alcohol while using any pain medication  o You can utilize ice as needed (no longer than 20 minutes at one time)    You may resume taking NSAIDs (ex. Ibuprofen, aleve, naproxen) 2 weeks after surgery as it may cause bleeding and interfere with bone healing     Incision Care  o No submerging incision in water such as pools, hot tubs, baths for at least 8 weeks or until incision is healed  o It is okay to shower, just pat the incision dry   o Remove dressing as instructed upon discharge  o Watch for signs of infection  - Redness, swelling, warmth, drainage, and fever of 101 degrees or higher  - [facility] [phone]    Bowel Care  o Many people have constipation (hard stools) after surgery.  To help prevent constipation: Drink plenty of fluid (8-10 glasses/day); Eat more fiber, such as whole grain bread, bran cereal, and fruits and vegetables; Stay active by walking; Over the counter stool softener may also help.      Activity Restrictions  o For the first 4-6 weeks, no lifting > 10 pounds, limited bending, twisting, or overhead reaching.  o Take stairs in moderation     Walking: Walking is the best way to start exercise after surgery. Take short frequent walks. You may gradually increase the distance as tolerated. If you feel pain, decrease your activity, but DO NOT stop walking. Walking will help you gain strength and prevent muscle soreness and spasms.   o Avoid bed rest and prolonged sitting for  longer than 30 minutes (change positions frequently while awake)  o No contact sports until after follow up visit  o No high impact activities such as; running/jogging, snowmobile or 4 flowers riding or any other recreational vehicles  o Please call the clinic if you develop any of the following symptoms:  - Swelling and/or warmth in one or both legs  - Pain or tenderness in your leg, ankle, foot, or arm   - Red or discolored skin     Post-Op Follow Up Appointments    2 week incision check with RN    6 week post op visit with Physical Assistant or Nurse Practitioner    3 months post op with Physical Assistant or Nurse Practitioner    Please call to schedule follow up appointment at [phone]    Resources    If you are currently employed, you will need to be off work for 2-4 weeks for post op recovery and healing.    Please fax any FMLA/short term disability paperwork to [phone]    You may call our clinic when you'd like to return to work and we can provide a work letter    To allow staff adequate time to complete paperwork, please send as soon as possible

## 2021-08-19 NOTE — LETTER
"    8/19/2021         RE: Pily Roche  [address]  [facility] [address]        Dear Colleague,    Thank you for referring your patient, Pily Roche, to the [facility]. Please see a copy of my visit note below.    Pily Roche is a 60 year old female with history of right L5-S1 microdiscectomy x2 at [facility] in 1994 and 1998, presents for evaluation of increased low back and leg pain. Last office visit with our clinic was in 2019. Patient has tried multiple injections, most recently L4-5 TRIPP in 2019 at [facility]. Today, patient reports pain is located in the bilateral low back and radiates to posterior right thigh to calf. Denies any paresthesias or weakness. Denies any left leg symptoms. Pain is worsened with daily activities, standing, housework. Pain is alleviated with rest, Advil, ice/heat. She also tried PT and pool therapy in the past. She is able to walk a short distance but then has to stop and rest due to pain. Denies any falls or foot drop. Follows with Urology for chronic urinary incontinence, s/p urethral sling procedure in 2008.     Returns for follow up.  Continued 9/10 pain as described above.  New MR shows L4-5 bilateral lateral recess stenosis.    Past Medical History:   Diagnosis Date     Advanced directives, counseling/discussion 2/26/2013    Patient does not have an Advance/Health Care Directive (HCD), given \"What is Advance Care Planning?\" flyer.  Maureen Iglesias February 26, 2013      Arthritis      Disorders of bursae and tendons in shoulder region, unspecified 6/8/2010     Dysfunctional uterine bleeding      History of substance abuse (H) 2006    cocaine, completed rehab     Hyperlipidemia      Insomnia      Lyme disease 1990     Pain in shoulder 3/16/2010     Seasonal allergies      Tobacco use disorders      Type 2 diabetes, HbA1c goal < 7% (H) 7/18/2013       Past Medical History reviewed with patient during " visit.    Past Surgical History:   Procedure Laterality Date     C SHOULDER SURG PROC UNLISTED  1992    X 3 (broken clavicle and rotator cuff tear with impingement     HC SACROPLASTY  1990's    Herniated L4-L5 X 2     HYSTERECTOMY, PAP NO LONGER INDICATED  2005     HYSTERECTOMY, VAGINAL  6/28/05    Ovaries in Place     Past Surgical History reviewed with patient during visit.    Current Outpatient Medications   Medication     albuterol (VENTOLIN HFA) 108 (90 Base) MCG/ACT inhaler     alcohol swab prep pads     aspirin 81 MG tablet     blood glucose (NO BRAND SPECIFIED) test strip     blood glucose monitoring (NO BRAND SPECIFIED) meter device kit     buPROPion (WELLBUTRIN SR) 150 MG 12 hr tablet     busPIRone (BUSPAR) 10 MG tablet     diclofenac (VOLTAREN) 75 MG EC tablet     fluticasone (FLONASE) 50 MCG/ACT nasal spray     gabapentin (NEURONTIN) 300 MG capsule     lisinopril (ZESTRIL) 10 MG tablet     loratadine (CLARITIN) 10 MG tablet     metFORMIN (GLUCOPHAGE-XR) 500 MG 24 hr tablet     methocarbamol (ROBAXIN) 500 MG tablet     mometasone-formoterol (DULERA) 100-5 MCG/ACT inhaler     omeprazole (PRILOSEC) 20 MG DR capsule     order for DME     simvastatin (ZOCOR) 40 MG tablet     traZODone (DESYREL) 100 MG tablet     Current Facility-Administered Medications   Medication     lidocaine 1% with EPINEPHrine 1:100,000 injection 3 mL       Allergies   Allergen Reactions     Quetiapine Anaphylaxis     Seroquel [Quetiapine Fumarate]      Insomnia  , anhedonia       Ambien [Zolpidem Tartrate] Difficulty breathing     Reaction after taking 5 mg on Wed, Thurs, then drank Friday afternoon and developed symptoms,  Irrability.       Chantix [Varenicline]      Zolpidem Unknown       Social History     Socioeconomic History     Marital status:      Spouse name: Not on file     Number of children: Not on file     Years of education: Not on file     Highest education level: Not on file   Occupational History     Not on file    Tobacco Use     Smoking status: Current Every Day Smoker     Packs/day: 1.50     Years: 30.00     Pack years: 45.00     Types: Cigarettes     Smokeless tobacco: Never Used     Tobacco comment: 1/2 pack to 1 ppd, has an ecig   Substance and Sexual Activity     Alcohol use: Yes     Alcohol/week: 5.0 - 10.0 standard drinks     Types: 5 - 10 Standard drinks or equivalent per week     Comment: 2beers every few fews     Drug use: Yes     Comment: marijuana     Sexual activity: Yes     Partners: Male   Other Topics Concern     Parent/sibling w/ CABG, MI or angioplasty before 65F 55M? Yes      Service No     Blood Transfusions No     Caffeine Concern No     Occupational Exposure No     Hobby Hazards No     Sleep Concern Yes     Stress Concern Yes     Weight Concern Yes     Special Diet No     Back Care Yes     Exercise Yes     Bike Helmet No     Seat Belt Yes     Self-Exams No   Social History Narrative     Not on file     Social Determinants of Health     Financial Resource Strain:      Difficulty of Paying Living Expenses:    Food Insecurity:      Worried About Running Out of Food in the Last Year:      Ran Out of Food in the Last Year:    Transportation Needs:      Lack of Transportation (Medical):      Lack of Transportation (Non-Medical):    Physical Activity:      Days of Exercise per Week:      Minutes of Exercise per Session:    Stress:      Feeling of Stress :    Social Connections:      Frequency of Communication with Friends and Family:      Frequency of Social Gatherings with Friends and Family:      Attends Restorationist Services:      Active Member of Clubs or Organizations:      Attends Club or Organization Meetings:      Marital Status:    Intimate Partner Violence:      Fear of Current or Ex-Partner:      Emotionally Abused:      Physically Abused:      Sexually Abused:        Family History   Problem Relation Age of Onset     Diabetes Mother      Hypertension Mother      Gynecology Mother       Arthritis Mother      Thyroid Disease Mother      Allergies Mother      Lipids Father      Heart Disease Father      C.A.D. Father      Macular Degeneration Father      Hypertension Maternal Grandmother      Arthritis Maternal Grandmother      Cancer Maternal Grandmother      Cancer Maternal Grandfather      C.A.D. Maternal Grandfather      Asthma Paternal Grandmother      C.A.D. Paternal Grandmother      Cerebrovascular Disease Paternal Grandfather      Alzheimer Disease Paternal Grandfather      Arthritis Paternal Grandfather      C.A.D. Paternal Grandfather      Cardiovascular Paternal Grandfather      Circulatory Paternal Grandfather      Glaucoma No family hx of        ROS: 10 point ROS neg other than the symptoms noted above in the HPI.    Vital Signs: BP (!) 129/91   Pulse 116   Temp 98.5  F (36.9  C) (Oral)   SpO2 95%     Examination:  Constitutional:  Alert, well nourished, NAD.  HEENT: Normocephalic, atraumatic.   Pulmonary:  Without shortness of breath, normal effort.   Lymph: No lymphadenopathy to low back or LE.   Integumentary: Skin is free of rashes or lesions.   Cardiovascular:  No pitting edema of BLE.      Neurological:  Awake  Alert  Oriented x 3  Speech clear  Cranial nerves II - XII grossly intact  PERRL  EOMI  Face symmetric  Tongue midline  Motor exam   Hip Flexor:                 Right: 5/5  Left:  5/5  Quadriceps:               Right:  5/5  Left:  5/5  Hamstrings:               Right:  5/5  Left:  5/5  Gastroc Soleus:         Right:  5/5  Left:  5/5  Tib/Ant:                      Right:  5/5  Left:  5/5  EHL:                          Right:  5/5  Left:  5/5         Sensation normal to bilateral upper and lower extremities.    Reflexes are 2+ in the patellar and Achilles. There is no clonus. Downgoing Babinski.    Musculoskeletal:  Gait: Able to stand from a seated position. Normal non-antalgic, non-myelopathic gait. Able to heel/toe walk without loss of balance.    Lumbar examination  reveals no tenderness of the spine or paraspinous muscles.  Hip height is symmetrical. Negative SI joint, sciatic notch or greater trochanteric tenderness to palpation bilaterally.  Straight leg raise is negative bilaterally.      Imaging:   MR LUMBAR SPINE W/O CONTRAST 8/28/2019 7:41 AM  Impression:   1. Lower lumbar spondylosis with moderate neural foraminal stenosis  bilaterally at L4-5.  2. Active inflammatory arthropathy of the bilateral L4-5 facet joints.    Assessment/Plan:   Chronic low back pain  Right leg pain  History of right L5-S1 microdiscectomy    Will plan for L4-5 lami/discectomy  Risks and benefits discussed          Again, thank you for allowing me to participate in the care of your patient.        Sincerely,        Ian Lomeli MD

## 2021-08-19 NOTE — NURSING NOTE
Reviewed pre- and post-operative instructions as outlined in the After Visit Summary/Patient Instructions with patient.   Surgery folder was given to patient    Patient Education Topic: Procedure with Dr. Lomeli     Learner(s): Patient    Knowledge Level: Basic    Readiness to Learn: Ready    Method:  Verbal explanation    Outcome: Able to verbalize instructions    Barriers to Learning: No barrier    Covid Testing: patient educated they will need to have a test for the novel Coronavirus, COVID-19.The test needs to be completed within 4 days (96) hours of surgery. Order Placed.    Scheduling Number: [phone]    Patient had the opportunity for questions to be answered. Advised Patient to call clinic with any questions/concerns. Gave patient antibacterial soap for pre-surgery skin preparation.

## 2021-08-19 NOTE — PROGRESS NOTES
"Pily Roche is a 60 year old female with history of right L5-S1 microdiscectomy x2 at [facility] in 1994 and 1998, presents for evaluation of increased low back and leg pain. Last office visit with our clinic was in 2019. Patient has tried multiple injections, most recently L4-5 TRIPP in 2019 at [facility]. Today, patient reports pain is located in the bilateral low back and radiates to posterior right thigh to calf. Denies any paresthesias or weakness. Denies any left leg symptoms. Pain is worsened with daily activities, standing, housework. Pain is alleviated with rest, Advil, ice/heat. She also tried PT and pool therapy in the past. She is able to walk a short distance but then has to stop and rest due to pain. Denies any falls or foot drop. Follows with Urology for chronic urinary incontinence, s/p urethral sling procedure in 2008.     Returns for follow up.  Continued 9/10 pain as described above.  New MR shows L4-5 bilateral lateral recess stenosis.    Past Medical History:   Diagnosis Date     Advanced directives, counseling/discussion 2/26/2013    Patient does not have an Advance/Health Care Directive (HCD), given \"What is Advance Care Planning?\" flyer.  Maureen Iglesias February 26, 2013      Arthritis      Disorders of bursae and tendons in shoulder region, unspecified 6/8/2010     Dysfunctional uterine bleeding      History of substance abuse (H) 2006    cocaine, completed rehab     Hyperlipidemia      Insomnia      Lyme disease 1990     Pain in shoulder 3/16/2010     Seasonal allergies      Tobacco use disorders      Type 2 diabetes, HbA1c goal < 7% (H) 7/18/2013       Past Medical History reviewed with patient during visit.    Past Surgical History:   Procedure Laterality Date     C SHOULDER SURG PROC UNLISTED  1992    X 3 (broken clavicle and rotator cuff tear with impingement     HC SACROPLASTY  1990's    Herniated L4-L5 X 2     HYSTERECTOMY, PAP NO LONGER INDICATED  2005     " HYSTERECTOMY, VAGINAL  6/28/05    Ovaries in Place     Past Surgical History reviewed with patient during visit.    Current Outpatient Medications   Medication     albuterol (VENTOLIN HFA) 108 (90 Base) MCG/ACT inhaler     alcohol swab prep pads     aspirin 81 MG tablet     blood glucose (NO BRAND SPECIFIED) test strip     blood glucose monitoring (NO BRAND SPECIFIED) meter device kit     buPROPion (WELLBUTRIN SR) 150 MG 12 hr tablet     busPIRone (BUSPAR) 10 MG tablet     diclofenac (VOLTAREN) 75 MG EC tablet     fluticasone (FLONASE) 50 MCG/ACT nasal spray     gabapentin (NEURONTIN) 300 MG capsule     lisinopril (ZESTRIL) 10 MG tablet     loratadine (CLARITIN) 10 MG tablet     metFORMIN (GLUCOPHAGE-XR) 500 MG 24 hr tablet     methocarbamol (ROBAXIN) 500 MG tablet     mometasone-formoterol (DULERA) 100-5 MCG/ACT inhaler     omeprazole (PRILOSEC) 20 MG DR capsule     order for DME     simvastatin (ZOCOR) 40 MG tablet     traZODone (DESYREL) 100 MG tablet     Current Facility-Administered Medications   Medication     lidocaine 1% with EPINEPHrine 1:100,000 injection 3 mL       Allergies   Allergen Reactions     Quetiapine Anaphylaxis     Seroquel [Quetiapine Fumarate]      Insomnia  , anhedonia       Ambien [Zolpidem Tartrate] Difficulty breathing     Reaction after taking 5 mg on Wed, Thurs, then drank Friday afternoon and developed symptoms,  Irrability.       Chantix [Varenicline]      Zolpidem Unknown       Social History     Socioeconomic History     Marital status:      Spouse name: Not on file     Number of children: Not on file     Years of education: Not on file     Highest education level: Not on file   Occupational History     Not on file   Tobacco Use     Smoking status: Current Every Day Smoker     Packs/day: 1.50     Years: 30.00     Pack years: 45.00     Types: Cigarettes     Smokeless tobacco: Never Used     Tobacco comment: 1/2 pack to 1 ppd, has an ecig   Substance and Sexual Activity      Alcohol use: Yes     Alcohol/week: 5.0 - 10.0 standard drinks     Types: 5 - 10 Standard drinks or equivalent per week     Comment: 2beers every few fews     Drug use: Yes     Comment: marijuana     Sexual activity: Yes     Partners: Male   Other Topics Concern     Parent/sibling w/ CABG, MI or angioplasty before 65F 55M? Yes      Service No     Blood Transfusions No     Caffeine Concern No     Occupational Exposure No     Hobby Hazards No     Sleep Concern Yes     Stress Concern Yes     Weight Concern Yes     Special Diet No     Back Care Yes     Exercise Yes     Bike Helmet No     Seat Belt Yes     Self-Exams No   Social History Narrative     Not on file     Social Determinants of Health     Financial Resource Strain:      Difficulty of Paying Living Expenses:    Food Insecurity:      Worried About Running Out of Food in the Last Year:      Ran Out of Food in the Last Year:    Transportation Needs:      Lack of Transportation (Medical):      Lack of Transportation (Non-Medical):    Physical Activity:      Days of Exercise per Week:      Minutes of Exercise per Session:    Stress:      Feeling of Stress :    Social Connections:      Frequency of Communication with Friends and Family:      Frequency of Social Gatherings with Friends and Family:      Attends Jew Services:      Active Member of Clubs or Organizations:      Attends Club or Organization Meetings:      Marital Status:    Intimate Partner Violence:      Fear of Current or Ex-Partner:      Emotionally Abused:      Physically Abused:      Sexually Abused:        Family History   Problem Relation Age of Onset     Diabetes Mother      Hypertension Mother      Gynecology Mother      Arthritis Mother      Thyroid Disease Mother      Allergies Mother      Lipids Father      Heart Disease Father      C.A.D. Father      Macular Degeneration Father      Hypertension Maternal Grandmother      Arthritis Maternal Grandmother      Cancer Maternal  Grandmother      Cancer Maternal Grandfather      C.A.D. Maternal Grandfather      Asthma Paternal Grandmother      C.A.D. Paternal Grandmother      Cerebrovascular Disease Paternal Grandfather      Alzheimer Disease Paternal Grandfather      Arthritis Paternal Grandfather      C.A.D. Paternal Grandfather      Cardiovascular Paternal Grandfather      Circulatory Paternal Grandfather      Glaucoma No family hx of        ROS: 10 point ROS neg other than the symptoms noted above in the HPI.    Vital Signs: BP (!) 129/91   Pulse 116   Temp 98.5  F (36.9  C) (Oral)   SpO2 95%     Examination:  Constitutional:  Alert, well nourished, NAD.  HEENT: Normocephalic, atraumatic.   Pulmonary:  Without shortness of breath, normal effort.   Lymph: No lymphadenopathy to low back or LE.   Integumentary: Skin is free of rashes or lesions.   Cardiovascular:  No pitting edema of BLE.      Neurological:  Awake  Alert  Oriented x 3  Speech clear  Cranial nerves II - XII grossly intact  PERRL  EOMI  Face symmetric  Tongue midline  Motor exam   Hip Flexor:                 Right: 5/5  Left:  5/5  Quadriceps:               Right:  5/5  Left:  5/5  Hamstrings:               Right:  5/5  Left:  5/5  Gastroc Soleus:         Right:  5/5  Left:  5/5  Tib/Ant:                      Right:  5/5  Left:  5/5  EHL:                          Right:  5/5  Left:  5/5         Sensation normal to bilateral upper and lower extremities.    Reflexes are 2+ in the patellar and Achilles. There is no clonus. Downgoing Babinski.    Musculoskeletal:  Gait: Able to stand from a seated position. Normal non-antalgic, non-myelopathic gait. Able to heel/toe walk without loss of balance.    Lumbar examination reveals no tenderness of the spine or paraspinous muscles.  Hip height is symmetrical. Negative SI joint, sciatic notch or greater trochanteric tenderness to palpation bilaterally.  Straight leg raise is negative bilaterally.      Imaging:   MR LUMBAR SPINE W/O  CONTRAST 8/28/2019 7:41 AM  Impression:   1. Lower lumbar spondylosis with moderate neural foraminal stenosis  bilaterally at L4-5.  2. Active inflammatory arthropathy of the bilateral L4-5 facet joints.    Assessment/Plan:   Chronic low back pain  Right leg pain  History of right L5-S1 microdiscectomy    Will plan for L4-5 lami/discectomy  Risks and benefits discussed

## 2021-08-19 NOTE — NURSING NOTE
"Pily Roche is a 60 year old female who presents for:  Chief Complaint   Patient presents with     RECHECK     Neurologic Problem     Chronic bilateral low back pain w/o sciatica        Initial Vitals:  BP (!) 129/91   Pulse 116   Temp 98.5  F (36.9  C) (Oral)   SpO2 95%  Estimated body mass index is 30.84 kg/m  as calculated from the following:    Height as of 1/8/20: 5' 5.5\" (1.664 m).    Weight as of 7/22/21: 188 lb 3.2 oz (85.4 kg).. There is no height or weight on file to calculate BSA. BP completed using cuff size: regular  Moderate Pain (5)      Jarrod Jensen MA    "

## 2021-09-08 ENCOUNTER — OFFICE VISIT (OUTPATIENT)
Dept: FAMILY MEDICINE | Facility: CLINIC | Age: 61
End: 2021-09-08
Payer: COMMERCIAL

## 2021-09-08 VITALS
WEIGHT: 186 LBS | SYSTOLIC BLOOD PRESSURE: 126 MMHG | HEIGHT: 64 IN | OXYGEN SATURATION: 93 % | HEART RATE: 121 BPM | TEMPERATURE: 98 F | DIASTOLIC BLOOD PRESSURE: 88 MMHG | BODY MASS INDEX: 31.76 KG/M2

## 2021-09-08 DIAGNOSIS — F33.1 MAJOR DEPRESSIVE DISORDER, RECURRENT EPISODE, MODERATE (H): ICD-10-CM

## 2021-09-08 DIAGNOSIS — N18.2 CKD (CHRONIC KIDNEY DISEASE) STAGE 2, GFR 60-89 ML/MIN: ICD-10-CM

## 2021-09-08 DIAGNOSIS — E11.22 TYPE 2 DIABETES MELLITUS WITH STAGE 2 CHRONIC KIDNEY DISEASE, WITHOUT LONG-TERM CURRENT USE OF INSULIN (H): ICD-10-CM

## 2021-09-08 DIAGNOSIS — N18.2 TYPE 2 DIABETES MELLITUS WITH STAGE 2 CHRONIC KIDNEY DISEASE, WITHOUT LONG-TERM CURRENT USE OF INSULIN (H): ICD-10-CM

## 2021-09-08 DIAGNOSIS — M54.16 LUMBAR RADICULOPATHY: Primary | ICD-10-CM

## 2021-09-08 DIAGNOSIS — E78.5 HYPERLIPIDEMIA LDL GOAL <100: ICD-10-CM

## 2021-09-08 DIAGNOSIS — F41.1 GAD (GENERALIZED ANXIETY DISORDER): ICD-10-CM

## 2021-09-08 DIAGNOSIS — Z71.89 COUNSELING REGARDING ADVANCED DIRECTIVES AND GOALS OF CARE: ICD-10-CM

## 2021-09-08 LAB
ALBUMIN SERPL-MCNC: 4.1 G/DL (ref 3.4–5)
ALP SERPL-CCNC: 104 U/L (ref 40–150)
ALT SERPL W P-5'-P-CCNC: 48 U/L (ref 0–50)
ANION GAP SERPL CALCULATED.3IONS-SCNC: 6 MMOL/L (ref 3–14)
AST SERPL W P-5'-P-CCNC: 31 U/L (ref 0–45)
BILIRUB SERPL-MCNC: 0.3 MG/DL (ref 0.2–1.3)
BUN SERPL-MCNC: 18 MG/DL (ref 7–30)
CALCIUM SERPL-MCNC: 9.3 MG/DL (ref 8.5–10.1)
CHLORIDE BLD-SCNC: 108 MMOL/L (ref 94–109)
CO2 SERPL-SCNC: 26 MMOL/L (ref 20–32)
CREAT SERPL-MCNC: 0.7 MG/DL (ref 0.52–1.04)
CREAT UR-MCNC: 175 MG/DL
ERYTHROCYTE [DISTWIDTH] IN BLOOD BY AUTOMATED COUNT: 12.5 % (ref 10–15)
GFR SERPL CREATININE-BSD FRML MDRD: >90 ML/MIN/1.73M2
GLUCOSE BLD-MCNC: 144 MG/DL (ref 70–99)
HBA1C MFR BLD: 6.5 % (ref 0–5.6)
HCT VFR BLD AUTO: 43.5 % (ref 35–47)
HGB BLD-MCNC: 14.3 G/DL (ref 11.7–15.7)
LDLC SERPL CALC-MCNC: 95 MG/DL
MCH RBC QN AUTO: 31.2 PG (ref 26.5–33)
MCHC RBC AUTO-ENTMCNC: 32.9 G/DL (ref 31.5–36.5)
MCV RBC AUTO: 95 FL (ref 78–100)
MICROALBUMIN UR-MCNC: 19 MG/L
MICROALBUMIN/CREAT UR: 10.86 MG/G CR (ref 0–25)
PLATELET # BLD AUTO: 254 10E3/UL (ref 150–450)
POTASSIUM BLD-SCNC: 4.2 MMOL/L (ref 3.4–5.3)
PROT SERPL-MCNC: 7.7 G/DL (ref 6.8–8.8)
RBC # BLD AUTO: 4.59 10E6/UL (ref 3.8–5.2)
SODIUM SERPL-SCNC: 140 MMOL/L (ref 133–144)
WBC # BLD AUTO: 9.3 10E3/UL (ref 4–11)

## 2021-09-08 PROCEDURE — 99214 OFFICE O/P EST MOD 30 MIN: CPT | Performed by: PHYSICIAN ASSISTANT

## 2021-09-08 PROCEDURE — 85027 COMPLETE CBC AUTOMATED: CPT | Performed by: PHYSICIAN ASSISTANT

## 2021-09-08 PROCEDURE — 83721 ASSAY OF BLOOD LIPOPROTEIN: CPT | Performed by: PHYSICIAN ASSISTANT

## 2021-09-08 PROCEDURE — 80053 COMPREHEN METABOLIC PANEL: CPT | Performed by: PHYSICIAN ASSISTANT

## 2021-09-08 PROCEDURE — 83036 HEMOGLOBIN GLYCOSYLATED A1C: CPT | Performed by: PHYSICIAN ASSISTANT

## 2021-09-08 PROCEDURE — 82043 UR ALBUMIN QUANTITATIVE: CPT | Performed by: PHYSICIAN ASSISTANT

## 2021-09-08 PROCEDURE — 36415 COLL VENOUS BLD VENIPUNCTURE: CPT | Performed by: PHYSICIAN ASSISTANT

## 2021-09-08 ASSESSMENT — MIFFLIN-ST. JEOR: SCORE: 1395.75

## 2021-09-08 NOTE — LETTER
September 9, 2021      Pily TORRES [facility]  [address] [place] [address]        Dear Pily,    Your labs look good!   Please follow-up if you have any questions or concerns.       Resulted Orders   LDL cholesterol direct   Result Value Ref Range    LDL Cholesterol Direct 95 <100 mg/dL      Comment:      Age 0-19 years:  Desirable: 0-110 mg/dL   Borderline high: 110-129 mg/dL   High: >= 130 mg/dL    Age 20 years and older:  Desirable: <100mg/dL  Above desirable: 100-129 mg/dL   Borderline high: 130-159 mg/dL   High: 160-189 mg/dL   Very high: >= 190 mg/dL   Hemoglobin A1c   Result Value Ref Range    Hemoglobin A1C 6.5 (H) 0.0 - 5.6 %      Comment:      Normal <5.7%   Prediabetes 5.7-6.4%    Diabetes 6.5% or higher     Note: Adopted from ADA consensus guidelines.   Comprehensive metabolic panel (BMP + Alb, Alk Phos, ALT, AST, Total. Bili, TP)   Result Value Ref Range    Sodium 140 133 - 144 mmol/L    Potassium 4.2 3.4 - 5.3 mmol/L    Chloride 108 94 - 109 mmol/L    Carbon Dioxide (CO2) 26 20 - 32 mmol/L    Anion Gap 6 3 - 14 mmol/L    Urea Nitrogen 18 7 - 30 mg/dL    Creatinine 0.70 0.52 - 1.04 mg/dL    Calcium 9.3 8.5 - 10.1 mg/dL    Glucose 144 (H) 70 - 99 mg/dL    Alkaline Phosphatase 104 40 - 150 U/L    AST 31 0 - 45 U/L    ALT 48 0 - 50 U/L    Protein Total 7.7 6.8 - 8.8 g/dL    Albumin 4.1 3.4 - 5.0 g/dL    Bilirubin Total 0.3 0.2 - 1.3 mg/dL    GFR Estimate >90 >60 mL/min/1.73m2      Comment:      As of July 11, 2021, eGFR is calculated by the CKD-EPI creatinine equation, without race adjustment. eGFR can be influenced by muscle mass, exercise, and diet. The reported eGFR is an estimation only and is only applicable if the renal function is stable.   CBC with platelets   Result Value Ref Range    WBC Count 9.3 4.0 - 11.0 10e3/uL    RBC Count 4.59 3.80 - 5.20 10e6/uL    Hemoglobin 14.3 11.7 - 15.7 g/dL    Hematocrit 43.5 35.0 - 47.0 %    MCV 95 78 - 100 fL    MCH 31.2 26.5 - 33.0 pg    MCHC 32.9 31.5  - 36.5 g/dL    RDW 12.5 10.0 - 15.0 %    Platelet Count 254 150 - 450 10e3/uL   Albumin Random Urine Quantitative with Creat Ratio   Result Value Ref Range    Creatinine Urine mg/dL 175 mg/dL    Albumin Urine mg/L 19 mg/L    Albumin Urine mg/g Cr 10.86 0.00 - 25.00 mg/g Cr       If you have any questions or concerns, please call the clinic at the number listed above.       Sincerely,      Kath Fierro PA-C

## 2021-09-08 NOTE — PATIENT INSTRUCTIONS
Stop aspirin 1 week prior to surgery.    Hold metformin the night before.  Ok to take the day of surgery with dinner.    Hold morning meds as well.

## 2021-09-08 NOTE — PROGRESS NOTES
[facility] [place]  [address]  [place] [address]  Phone: [phone]  Primary Provider: Jacoby Merritt  Pre-op Performing Provider: JACOBY MERRITT      PREOPERATIVE EVALUATION:  Today's date: 9/8/2021    Pily Roche is a 60 year old female who presents for a preoperative evaluation.    Surgical Information:  Surgery/Procedure: Lumbar 4 to Lumbar 5 laminectomy/discectomy  Surgery Location: [facility]  Surgeon: Armani  Surgery Date: 09/14/2021  Time of Surgery: 8:00AM   Where patient plans to recover: At home with family  Fax number for surgical facility: Note does not need to be faxed, will be available electronically in Epic.    Type of Anesthesia Anticipated: General    Assessment & Plan     The proposed surgical procedure is considered INTERMEDIATE risk.    Lumbar radiculopathy  Conservative treatments have not provided relief, surgical intervention to be pursued next.     Type 2 diabetes mellitus with stage 2 chronic kidney disease, without long-term current use of insulin (H)  Blood sugars have been well controlled.  Hold metformin the night before surgery.     Hyperlipidemia LDL goal <100  Not fasting today, will check an LDL.     Major depressive disorder, recurrent episode, moderate (H)  Stable.     GUDELIA (generalized anxiety disorder)  Stable.     CKD (chronic kidney disease) stage 2, GFR 60-89 ml/min  Stable, will monitor.            Risks and Recommendations:  The patient has the following additional risks and recommendations for perioperative complications:  Diabetes:  - Patient is not on insulin therapy: regular NPO guidelines can be followed.   Pulmonary:    - Incentive spirometry post-op   - Active nicotine user, advised smoking cessation    Medication Instructions:  see below:  Stop aspirin 1 week prior to surgery.    Hold metformin the night before.  Ok to take the day of surgery with dinner.    Hold morning meds as well.         RECOMMENDATION:  APPROVAL GIVEN to proceed with proposed procedure, without further diagnostic evaluation.    Review of external notes as documented above           20 minutes spent on the date of the encounter doing chart review, history and exam, documentation and further activities per the note        Subjective     HPI related to upcoming procedure: persistent low back pain    Unable to do her daily activities that she would like to do (like walk the dogs) due to back pain.  Has tried pool therapy, physical therapy, injections without relief.      Pain is mainly in right hip and shoots down right leg to knee.      Preop Questions 9/8/2021   1. Have you ever had a heart attack or stroke? No   2. Have you ever had surgery on your heart or blood vessels, such as a stent placement, a coronary artery bypass, or surgery on an artery in your head, neck, heart, or legs? No   3. Do you have chest pain with activity? No   4. Do you have a history of  heart failure? No   5. Do you currently have a cold, bronchitis or symptoms of other infection? No   6. Do you have a cough, shortness of breath, or wheezing? No   7. Do you or anyone in your family have previous history of blood clots? No   8. Do you or does anyone in your family have a serious bleeding problem such as prolonged bleeding following surgeries or cuts? No   9. Have you ever had problems with anemia or been told to take iron pills? No   10. Have you had any abnormal blood loss such as black, tarry or bloody stools, or abnormal vaginal bleeding? No   11. Have you ever had a blood transfusion? No   12. Are you willing to have a blood transfusion if it is medically needed before, during, or after your surgery? Yes   13. Have you or any of your relatives ever had problems with anesthesia? YES - Patients blood pressure lowers    14. Do you have sleep apnea, excessive snoring or daytime drowsiness? No   15. Do you have any artifical heart valves or other  implanted medical devices like a pacemaker, defibrillator, or continuous glucose monitor? No   16. Do you have artificial joints? No   17. Are you allergic to latex? No   18. Is there any chance that you may be pregnant? No       Health Care Directive:  Patient does not have a Health Care Directive or Living Will: Discussed advance care planning with patient; information given to patient to review.  2 blank copies given    Preoperative Review of :   reviewed - no record of controlled substances prescribed.      Status of Chronic Conditions:  DIABETES - Patient has a longstanding history of DiabetesType Type II . Patient is being treated with oral agents and denies significant side effects. Control has been good. Complicating factors include but are not limited to: hyperlipidemia and chronic kidney disease.     HYPERLIPIDEMIA - Patient has a long history of significant Hyperlipidemia requiring medication for treatment with recent good control. Patient reports no problems or side effects with the medication.       Review of Systems  Constitutional, neuro, ENT, endocrine, pulmonary, cardiac, gastrointestinal, genitourinary, musculoskeletal, integument and psychiatric systems are negative, except as otherwise noted.    Patient Active Problem List    Diagnosis Date Noted     Chronic pain syndrome 09/19/2016     Priority: High     Patient is followed by Kath Fierro PA-C for ongoing prescription of pain medication.  All refills should be approved by this provider, or covering partner.    No further narcotics to be refilled by me, UDS showed methamphetamine.  Referral to pain clinic done.     Controlled substance agreement:  Encounter-Level CSA:     There are no encounter-level csa.        Pain Clinic evaluation in the past: Yes       Date/Location:      DIRE Total Score(s):  No flowsheet data found.    Last [facility] website verification:  done on 9/19/16   [email or web address]               Type II  diabetes mellitus with stage 2 chronic kidney disease (H) 06/13/2016     Priority: High     Non morbid obesity due to excess calories 06/13/2016     Priority: High     Moderate major depression (H) 04/12/2010     Priority: High     Lumbar radiculopathy 08/19/2021     Priority: Medium     Added automatically from request for surgery [id number]       History of methamphetamine abuse (H) 11/02/2020     Priority: Medium     LPRD (laryngopharyngeal reflux disease) 05/11/2020     Priority: Medium     Major depressive disorder, recurrent episode, moderate (H) 05/11/2020     Priority: Medium     Ingrown toenail without infection 12/04/2017     Priority: Medium     H/O alcohol abuse 09/27/2017     Priority: Medium     GUDELIA (generalized anxiety disorder) 09/27/2017     Priority: Medium     Elevated blood pressure reading without diagnosis of hypertension 09/27/2017     Priority: Medium     Type 2 diabetes mellitus without retinopathy (H) 01/05/2017     Priority: Medium     Chronic bilateral low back pain without sciatica 09/19/2016     Priority: Medium     Menopausal symptoms 06/13/2016     Priority: Medium     Macular degeneration 12/08/2015     Priority: Medium     CKD (chronic kidney disease) stage 2, GFR 60-89 ml/min 10/29/2015     Priority: Medium     Chronic shoulder pain, unspecified laterality 07/13/2015     Priority: Medium     Anxiety 10/28/2014     Priority: Medium     Tobacco use disorder 03/19/2014     Priority: Medium     Rotator cuff tear 12/05/2013     Priority: Medium     AC separation 12/05/2013     Priority: Medium     Impingement syndrome, shoulder 08/15/2013     Priority: Medium     Type 2 diabetes, HbA1c goal < 7% (H) 07/18/2013     Priority: Medium     Hyperlipidemia LDL goal <100 07/18/2013     Priority: Medium     Chronic bronchitis, unspecified chronic bronchitis type (H) 11/05/2012     Priority: Medium     Mild, spirometry 11/5/12       Elevated liver enzymes 04/08/2010     Priority: Medium     Advance  "care planning 02/26/2013     Priority: Low     Advance Care Planning 2/26/2013: Patient does not have an Advance/Health Care Directive (HCD), given \"What is Advance Care Planning?\" codi. Maureen Iglesias. February 26, 2013                 Disorder of bursae and tendons in shoulder region 06/08/2010     Priority: Low     Problem list name updated by automated process. Provider to review       Chronic low back pain 04/08/2010     Priority: Low     Insomnia      Priority: Low      Past Medical History:   Diagnosis Date     Advanced directives, counseling/discussion 2/26/2013    Patient does not have an Advance/Health Care Directive (HCD), given \"What is Advance Care Planning?\" codi.  Maureen Iglesias February 26, 2013      Arthritis      Disorders of bursae and tendons in shoulder region, unspecified 6/8/2010     Dysfunctional uterine bleeding      History of substance abuse (H) 2006    cocaine, completed rehab     Hyperlipidemia      Insomnia      Lyme disease 1990     Pain in shoulder 3/16/2010     Seasonal allergies      Tobacco use disorders      Type 2 diabetes, HbA1c goal < 7% (H) 7/18/2013     Past Surgical History:   Procedure Laterality Date     C SHOULDER SURG PROC UNLISTED  1992    X 3 (broken clavicle and rotator cuff tear with impingement     HC SACROPLASTY  1990's    Herniated L4-L5 X 2     HYSTERECTOMY, PAP NO LONGER INDICATED  2005     HYSTERECTOMY, VAGINAL  6/28/05    Ovaries in Place     Current Outpatient Medications   Medication Sig Dispense Refill     albuterol (VENTOLIN HFA) 108 (90 Base) MCG/ACT inhaler INHALE 2 PUFFS EVERY 6 HOURS AS NEEDED FOR SHORTNESS OF BREATH OR DYSPNEA 36 g 1     alcohol swab prep pads Use to swab area of injection/mike as directed 100 each 3     aspirin 81 MG tablet Take 1 tablet (81 mg) by mouth At Bedtime 90 tablet 3     blood glucose (NO BRAND SPECIFIED) test strip Use to test blood sugar one time daily or as directed. To accompany: Blood Glucose Monitor Brands: " per insurance. 100 strip 6     blood glucose monitoring (NO BRAND SPECIFIED) meter device kit Use to test blood sugar one time daily or as directed. 1 kit 0     buPROPion (WELLBUTRIN SR) 150 MG 12 hr tablet Take 1 tablet (150 mg) by mouth 2 times daily 180 tablet 1     busPIRone (BUSPAR) 10 MG tablet Take 2 tablets (20 mg) by mouth 2 times daily 360 tablet 1     diclofenac (VOLTAREN) 75 MG EC tablet TAKE 1 TABLET BY MOUTH TWICE DAILY AS NEEDED FOR MODERATE PAIN. 180 tablet 1     fluticasone (FLONASE) 50 MCG/ACT nasal spray Spray 1 spray into both nostrils daily 9 mL 1     gabapentin (NEURONTIN) 300 MG capsule TAKE 3 CAPSULES BY MOUTH THREE TIMES DAILY 810 capsule 1     lisinopril (ZESTRIL) 10 MG tablet Take 1 tablet (10 mg) by mouth daily 90 tablet 1     loratadine (CLARITIN) 10 MG tablet TAKE 1 TABLET (10 MG) BY MOUTH DAILY 90 tablet 0     metFORMIN (GLUCOPHAGE-XR) 500 MG 24 hr tablet Take 4 tablets (2,000 mg) by mouth daily (with dinner) 360 tablet 1     methocarbamol (ROBAXIN) 500 MG tablet TAKE 2 TABLETS BY MOUTH THREE TIMES DAILY AS NEEDED MUSCLE SPASMS 180 tablet 1     mometasone-formoterol (DULERA) 100-5 MCG/ACT inhaler Inhale 2 puffs into the lungs 2 times daily 13 g 1     omeprazole (PRILOSEC) 20 MG DR capsule Take 1 capsule (20 mg) by mouth daily as needed (GERD) 90 capsule 1     order for DME 1 glucometer per insurance formulary 1 Device 0     simvastatin (ZOCOR) 40 MG tablet Take 1 tablet (40 mg) by mouth At Bedtime 90 tablet 1     traZODone (DESYREL) 100 MG tablet TAKE 2 TABLETS BY MOUTH AT BEDTIME 180 tablet 1       Allergies   Allergen Reactions     Quetiapine Anaphylaxis     Seroquel [Quetiapine Fumarate]      Insomnia  , anhedonia       Ambien [Zolpidem Tartrate] Difficulty breathing     Reaction after taking 5 mg on Wed, Thurs, then drank Friday afternoon and developed symptoms,  Irrability.       Chantix [Varenicline]      Zolpidem Unknown        Social History     Tobacco Use     Smoking status:  "Current Every Day Smoker     Packs/day: 1.50     Years: 30.00     Pack years: 45.00     Types: Cigarettes     Smokeless tobacco: Never Used     Tobacco comment: 1/2 pack to 1 ppd, has an ecig   Substance Use Topics     Alcohol use: Yes     Alcohol/week: 5.0 - 10.0 standard drinks     Types: 5 - 10 Standard drinks or equivalent per week     Comment: 2beers every few fews       History   Drug Use     Comment: marijuana         Objective     /88 (BP Location: Right arm, Patient Position: Chair, Cuff Size: Adult Regular)   Pulse (!) 121   Temp 98  F (36.7  C) (Tympanic)   Ht 1.613 m (5' 3.5\")   Wt 84.4 kg (186 lb)   SpO2 93%   Breastfeeding No   BMI 32.43 kg/m      Physical Exam    GENERAL APPEARANCE: healthy, alert and no distress     EYES: EOMI, PERRL     HENT: ear canals and TM's normal and nose and mouth without ulcers or lesions     NECK: no adenopathy, no asymmetry, masses, or scars and thyroid normal to palpation     RESP: lungs clear to auscultation - no rales, rhonchi or wheezes     CV: regular rates and rhythm, normal S1 S2, no S3 or S4 and no murmur, click or rub     ABDOMEN:  soft, nontender, no HSM or masses and bowel sounds normal     MS: extremities normal- no gross deformities noted, no evidence of inflammation in joints, FROM in all extremities.     SKIN: no suspicious lesions or rashes     NEURO: Normal strength and tone, sensory exam grossly normal, mentation intact and speech normal     PSYCH: mentation appears normal. and affect normal/bright     LYMPHATICS: No cervical adenopathy    Recent Labs   Lab Test 06/21/21  0833 09/21/20  0910    139   POTASSIUM 5.0 4.3   CR 0.61 0.82   A1C 6.8* 6.7*        Diagnostics:  Recent Results (from the past 24 hour(s))   Hemoglobin A1c    Collection Time: 09/08/21  8:17 AM   Result Value Ref Range    Hemoglobin A1C 6.5 (H) 0.0 - 5.6 %   CBC with platelets    Collection Time: 09/08/21  8:17 AM   Result Value Ref Range    WBC Count 9.3 4.0 - 11.0 " 10e3/uL    RBC Count 4.59 3.80 - 5.20 10e6/uL    Hemoglobin 14.3 11.7 - 15.7 g/dL    Hematocrit 43.5 35.0 - 47.0 %    MCV 95 78 - 100 fL    MCH 31.2 26.5 - 33.0 pg    MCHC 32.9 31.5 - 36.5 g/dL    RDW 12.5 10.0 - 15.0 %    Platelet Count 254 150 - 450 10e3/uL      No EKG required, no history of coronary heart disease, significant arrhythmia, peripheral arterial disease or other structural heart disease.    Revised Cardiac Risk Index (RCRI):  The patient has the following serious cardiovascular risks for perioperative complications:   - No serious cardiac risks = 0 points     RCRI Interpretation: 0 points: Class I (very low risk - 0.4% complication rate)           Signed Electronically by: Kath Fierro PA-C  Copy of this evaluation report is provided to requesting physician.

## 2021-09-08 NOTE — H&P (VIEW-ONLY)
[facility] [place]  [id number]  [place] [address]  Phone: [phone]  Primary Provider: Jacoby Merritt  Pre-op Performing Provider: JACOBY MERRITT      PREOPERATIVE EVALUATION:  Today's date: 9/8/2021    Pily Roche is a 60 year old female who presents for a preoperative evaluation.    Surgical Information:  Surgery/Procedure: Lumbar 4 to Lumbar 5 laminectomy/discectomy  Surgery Location: [facility]  Surgeon: Armani  Surgery Date: 09/14/2021  Time of Surgery: 8:00AM   Where patient plans to recover: At home with family  Fax number for surgical facility: Note does not need to be faxed, will be available electronically in Epic.    Type of Anesthesia Anticipated: General    Assessment & Plan     The proposed surgical procedure is considered INTERMEDIATE risk.    Lumbar radiculopathy  Conservative treatments have not provided relief, surgical intervention to be pursued next.     Type 2 diabetes mellitus with stage 2 chronic kidney disease, without long-term current use of insulin (H)  Blood sugars have been well controlled.  Hold metformin the night before surgery.     Hyperlipidemia LDL goal <100  Not fasting today, will check an LDL.     Major depressive disorder, recurrent episode, moderate (H)  Stable.     GUDELIA (generalized anxiety disorder)  Stable.     CKD (chronic kidney disease) stage 2, GFR 60-89 ml/min  Stable, will monitor.            Risks and Recommendations:  The patient has the following additional risks and recommendations for perioperative complications:  Diabetes:  - Patient is not on insulin therapy: regular NPO guidelines can be followed.   Pulmonary:    - Incentive spirometry post-op   - Active nicotine user, advised smoking cessation    Medication Instructions:  see below:  Stop aspirin 1 week prior to surgery.    Hold metformin the night before.  Ok to take the day of surgery with dinner.    Hold morning meds as well.         RECOMMENDATION:  APPROVAL GIVEN to proceed with proposed procedure, without further diagnostic evaluation.    Review of external notes as documented above           20 minutes spent on the date of the encounter doing chart review, history and exam, documentation and further activities per the note        Subjective     HPI related to upcoming procedure: persistent low back pain    Unable to do her daily activities that she would like to do (like walk the dogs) due to back pain.  Has tried pool therapy, physical therapy, injections without relief.      Pain is mainly in right hip and shoots down right leg to knee.      Preop Questions 9/8/2021   1. Have you ever had a heart attack or stroke? No   2. Have you ever had surgery on your heart or blood vessels, such as a stent placement, a coronary artery bypass, or surgery on an artery in your head, neck, heart, or legs? No   3. Do you have chest pain with activity? No   4. Do you have a history of  heart failure? No   5. Do you currently have a cold, bronchitis or symptoms of other infection? No   6. Do you have a cough, shortness of breath, or wheezing? No   7. Do you or anyone in your family have previous history of blood clots? No   8. Do you or does anyone in your family have a serious bleeding problem such as prolonged bleeding following surgeries or cuts? No   9. Have you ever had problems with anemia or been told to take iron pills? No   10. Have you had any abnormal blood loss such as black, tarry or bloody stools, or abnormal vaginal bleeding? No   11. Have you ever had a blood transfusion? No   12. Are you willing to have a blood transfusion if it is medically needed before, during, or after your surgery? Yes   13. Have you or any of your relatives ever had problems with anesthesia? YES - Patients blood pressure lowers    14. Do you have sleep apnea, excessive snoring or daytime drowsiness? No   15. Do you have any artifical heart valves or other  implanted medical devices like a pacemaker, defibrillator, or continuous glucose monitor? No   16. Do you have artificial joints? No   17. Are you allergic to latex? No   18. Is there any chance that you may be pregnant? No       Health Care Directive:  Patient does not have a Health Care Directive or Living Will: Discussed advance care planning with patient; information given to patient to review.  2 blank copies given    Preoperative Review of :   reviewed - no record of controlled substances prescribed.      Status of Chronic Conditions:  DIABETES - Patient has a longstanding history of DiabetesType Type II . Patient is being treated with oral agents and denies significant side effects. Control has been good. Complicating factors include but are not limited to: hyperlipidemia and chronic kidney disease.     HYPERLIPIDEMIA - Patient has a long history of significant Hyperlipidemia requiring medication for treatment with recent good control. Patient reports no problems or side effects with the medication.       Review of Systems  Constitutional, neuro, ENT, endocrine, pulmonary, cardiac, gastrointestinal, genitourinary, musculoskeletal, integument and psychiatric systems are negative, except as otherwise noted.    Patient Active Problem List    Diagnosis Date Noted     Chronic pain syndrome 09/19/2016     Priority: High     Patient is followed by Kath Fierro PA-C for ongoing prescription of pain medication.  All refills should be approved by this provider, or covering partner.    No further narcotics to be refilled by me, UDS showed methamphetamine.  Referral to pain clinic done.     Controlled substance agreement:  Encounter-Level CSA:     There are no encounter-level csa.        Pain Clinic evaluation in the past: Yes       Date/Location:      DIRE Total Score(s):  No flowsheet data found.    Last [facility] website verification:  done on 9/19/16   [email or web address]               Type II  diabetes mellitus with stage 2 chronic kidney disease (H) 06/13/2016     Priority: High     Non morbid obesity due to excess calories 06/13/2016     Priority: High     Moderate major depression (H) 04/12/2010     Priority: High     Lumbar radiculopathy 08/19/2021     Priority: Medium     Added automatically from request for surgery [id number]       History of methamphetamine abuse (H) 11/02/2020     Priority: Medium     LPRD (laryngopharyngeal reflux disease) 05/11/2020     Priority: Medium     Major depressive disorder, recurrent episode, moderate (H) 05/11/2020     Priority: Medium     Ingrown toenail without infection 12/04/2017     Priority: Medium     H/O alcohol abuse 09/27/2017     Priority: Medium     GUDELIA (generalized anxiety disorder) 09/27/2017     Priority: Medium     Elevated blood pressure reading without diagnosis of hypertension 09/27/2017     Priority: Medium     Type 2 diabetes mellitus without retinopathy (H) 01/05/2017     Priority: Medium     Chronic bilateral low back pain without sciatica 09/19/2016     Priority: Medium     Menopausal symptoms 06/13/2016     Priority: Medium     Macular degeneration 12/08/2015     Priority: Medium     CKD (chronic kidney disease) stage 2, GFR 60-89 ml/min 10/29/2015     Priority: Medium     Chronic shoulder pain, unspecified laterality 07/13/2015     Priority: Medium     Anxiety 10/28/2014     Priority: Medium     Tobacco use disorder 03/19/2014     Priority: Medium     Rotator cuff tear 12/05/2013     Priority: Medium     AC separation 12/05/2013     Priority: Medium     Impingement syndrome, shoulder 08/15/2013     Priority: Medium     Type 2 diabetes, HbA1c goal < 7% (H) 07/18/2013     Priority: Medium     Hyperlipidemia LDL goal <100 07/18/2013     Priority: Medium     Chronic bronchitis, unspecified chronic bronchitis type (H) 11/05/2012     Priority: Medium     Mild, spirometry 11/5/12       Elevated liver enzymes 04/08/2010     Priority: Medium     Advance  "care planning 02/26/2013     Priority: Low     Advance Care Planning 2/26/2013: Patient does not have an Advance/Health Care Directive (HCD), given \"What is Advance Care Planning?\" codi. Maureen Iglesias. February 26, 2013                 Disorder of bursae and tendons in shoulder region 06/08/2010     Priority: Low     Problem list name updated by automated process. Provider to review       Chronic low back pain 04/08/2010     Priority: Low     Insomnia      Priority: Low      Past Medical History:   Diagnosis Date     Advanced directives, counseling/discussion 2/26/2013    Patient does not have an Advance/Health Care Directive (HCD), given \"What is Advance Care Planning?\" codi.  Maureen Iglesias February 26, 2013      Arthritis      Disorders of bursae and tendons in shoulder region, unspecified 6/8/2010     Dysfunctional uterine bleeding      History of substance abuse (H) 2006    cocaine, completed rehab     Hyperlipidemia      Insomnia      Lyme disease 1990     Pain in shoulder 3/16/2010     Seasonal allergies      Tobacco use disorders      Type 2 diabetes, HbA1c goal < 7% (H) 7/18/2013     Past Surgical History:   Procedure Laterality Date     C SHOULDER SURG PROC UNLISTED  1992    X 3 (broken clavicle and rotator cuff tear with impingement     HC SACROPLASTY  1990's    Herniated L4-L5 X 2     HYSTERECTOMY, PAP NO LONGER INDICATED  2005     HYSTERECTOMY, VAGINAL  6/28/05    Ovaries in Place     Current Outpatient Medications   Medication Sig Dispense Refill     albuterol (VENTOLIN HFA) 108 (90 Base) MCG/ACT inhaler INHALE 2 PUFFS EVERY 6 HOURS AS NEEDED FOR SHORTNESS OF BREATH OR DYSPNEA 36 g 1     alcohol swab prep pads Use to swab area of injection/mike as directed 100 each 3     aspirin 81 MG tablet Take 1 tablet (81 mg) by mouth At Bedtime 90 tablet 3     blood glucose (NO BRAND SPECIFIED) test strip Use to test blood sugar one time daily or as directed. To accompany: Blood Glucose Monitor Brands: " per insurance. 100 strip 6     blood glucose monitoring (NO BRAND SPECIFIED) meter device kit Use to test blood sugar one time daily or as directed. 1 kit 0     buPROPion (WELLBUTRIN SR) 150 MG 12 hr tablet Take 1 tablet (150 mg) by mouth 2 times daily 180 tablet 1     busPIRone (BUSPAR) 10 MG tablet Take 2 tablets (20 mg) by mouth 2 times daily 360 tablet 1     diclofenac (VOLTAREN) 75 MG EC tablet TAKE 1 TABLET BY MOUTH TWICE DAILY AS NEEDED FOR MODERATE PAIN. 180 tablet 1     fluticasone (FLONASE) 50 MCG/ACT nasal spray Spray 1 spray into both nostrils daily 9 mL 1     gabapentin (NEURONTIN) 300 MG capsule TAKE 3 CAPSULES BY MOUTH THREE TIMES DAILY 810 capsule 1     lisinopril (ZESTRIL) 10 MG tablet Take 1 tablet (10 mg) by mouth daily 90 tablet 1     loratadine (CLARITIN) 10 MG tablet TAKE 1 TABLET (10 MG) BY MOUTH DAILY 90 tablet 0     metFORMIN (GLUCOPHAGE-XR) 500 MG 24 hr tablet Take 4 tablets (2,000 mg) by mouth daily (with dinner) 360 tablet 1     methocarbamol (ROBAXIN) 500 MG tablet TAKE 2 TABLETS BY MOUTH THREE TIMES DAILY AS NEEDED MUSCLE SPASMS 180 tablet 1     mometasone-formoterol (DULERA) 100-5 MCG/ACT inhaler Inhale 2 puffs into the lungs 2 times daily 13 g 1     omeprazole (PRILOSEC) 20 MG DR capsule Take 1 capsule (20 mg) by mouth daily as needed (GERD) 90 capsule 1     order for DME 1 glucometer per insurance formulary 1 Device 0     simvastatin (ZOCOR) 40 MG tablet Take 1 tablet (40 mg) by mouth At Bedtime 90 tablet 1     traZODone (DESYREL) 100 MG tablet TAKE 2 TABLETS BY MOUTH AT BEDTIME 180 tablet 1       Allergies   Allergen Reactions     Quetiapine Anaphylaxis     Seroquel [Quetiapine Fumarate]      Insomnia  , anhedonia       Ambien [Zolpidem Tartrate] Difficulty breathing     Reaction after taking 5 mg on Wed, Thurs, then drank Friday afternoon and developed symptoms,  Irrability.       Chantix [Varenicline]      Zolpidem Unknown        Social History     Tobacco Use     Smoking status:  "Current Every Day Smoker     Packs/day: 1.50     Years: 30.00     Pack years: 45.00     Types: Cigarettes     Smokeless tobacco: Never Used     Tobacco comment: 1/2 pack to 1 ppd, has an ecig   Substance Use Topics     Alcohol use: Yes     Alcohol/week: 5.0 - 10.0 standard drinks     Types: 5 - 10 Standard drinks or equivalent per week     Comment: 2beers every few fews       History   Drug Use     Comment: marijuana         Objective     /88 (BP Location: Right arm, Patient Position: Chair, Cuff Size: Adult Regular)   Pulse (!) 121   Temp 98  F (36.7  C) (Tympanic)   Ht 1.613 m (5' 3.5\")   Wt 84.4 kg (186 lb)   SpO2 93%   Breastfeeding No   BMI 32.43 kg/m      Physical Exam    GENERAL APPEARANCE: healthy, alert and no distress     EYES: EOMI, PERRL     HENT: ear canals and TM's normal and nose and mouth without ulcers or lesions     NECK: no adenopathy, no asymmetry, masses, or scars and thyroid normal to palpation     RESP: lungs clear to auscultation - no rales, rhonchi or wheezes     CV: regular rates and rhythm, normal S1 S2, no S3 or S4 and no murmur, click or rub     ABDOMEN:  soft, nontender, no HSM or masses and bowel sounds normal     MS: extremities normal- no gross deformities noted, no evidence of inflammation in joints, FROM in all extremities.     SKIN: no suspicious lesions or rashes     NEURO: Normal strength and tone, sensory exam grossly normal, mentation intact and speech normal     PSYCH: mentation appears normal. and affect normal/bright     LYMPHATICS: No cervical adenopathy    Recent Labs   Lab Test 06/21/21  0833 09/21/20  0910    139   POTASSIUM 5.0 4.3   CR 0.61 0.82   A1C 6.8* 6.7*        Diagnostics:  Recent Results (from the past 24 hour(s))   Hemoglobin A1c    Collection Time: 09/08/21  8:17 AM   Result Value Ref Range    Hemoglobin A1C 6.5 (H) 0.0 - 5.6 %   CBC with platelets    Collection Time: 09/08/21  8:17 AM   Result Value Ref Range    WBC Count 9.3 4.0 - 11.0 " 10e3/uL    RBC Count 4.59 3.80 - 5.20 10e6/uL    Hemoglobin 14.3 11.7 - 15.7 g/dL    Hematocrit 43.5 35.0 - 47.0 %    MCV 95 78 - 100 fL    MCH 31.2 26.5 - 33.0 pg    MCHC 32.9 31.5 - 36.5 g/dL    RDW 12.5 10.0 - 15.0 %    Platelet Count 254 150 - 450 10e3/uL      No EKG required, no history of coronary heart disease, significant arrhythmia, peripheral arterial disease or other structural heart disease.    Revised Cardiac Risk Index (RCRI):  The patient has the following serious cardiovascular risks for perioperative complications:   - No serious cardiac risks = 0 points     RCRI Interpretation: 0 points: Class I (very low risk - 0.4% complication rate)           Signed Electronically by: Kath Fierro PA-C  Copy of this evaluation report is provided to requesting physician.

## 2021-09-10 ENCOUNTER — LAB (OUTPATIENT)
Dept: LAB | Facility: CLINIC | Age: 61
End: 2021-09-10
Payer: COMMERCIAL

## 2021-09-10 DIAGNOSIS — Z11.59 ENCOUNTER FOR SCREENING FOR OTHER VIRAL DISEASES: ICD-10-CM

## 2021-09-10 PROCEDURE — U0005 INFEC AGEN DETEC AMPLI PROBE: HCPCS

## 2021-09-10 PROCEDURE — U0003 INFECTIOUS AGENT DETECTION BY NUCLEIC ACID (DNA OR RNA); SEVERE ACUTE RESPIRATORY SYNDROME CORONAVIRUS 2 (SARS-COV-2) (CORONAVIRUS DISEASE [COVID-19]), AMPLIFIED PROBE TECHNIQUE, MAKING USE OF HIGH THROUGHPUT TECHNOLOGIES AS DESCRIBED BY CMS-2020-01-R: HCPCS

## 2021-09-11 LAB — SARS-COV-2 RNA RESP QL NAA+PROBE: NEGATIVE

## 2021-09-14 ENCOUNTER — ANESTHESIA (OUTPATIENT)
Dept: SURGERY | Facility: CLINIC | Age: 61
End: 2021-09-14
Payer: COMMERCIAL

## 2021-09-14 ENCOUNTER — HOSPITAL ENCOUNTER (OUTPATIENT)
Facility: CLINIC | Age: 61
Discharge: HOME | End: 2021-09-14
Attending: NEUROLOGICAL SURGERY | Admitting: NEUROLOGICAL SURGERY
Payer: COMMERCIAL

## 2021-09-14 ENCOUNTER — APPOINTMENT (OUTPATIENT)
Dept: GENERAL RADIOLOGY | Facility: CLINIC | Age: 61
End: 2021-09-14
Attending: NEUROLOGICAL SURGERY
Payer: COMMERCIAL

## 2021-09-14 ENCOUNTER — ANESTHESIA EVENT (OUTPATIENT)
Dept: SURGERY | Facility: CLINIC | Age: 61
End: 2021-09-14
Payer: COMMERCIAL

## 2021-09-14 VITALS
SYSTOLIC BLOOD PRESSURE: 112 MMHG | TEMPERATURE: 97.2 F | BODY MASS INDEX: 30.66 KG/M2 | HEART RATE: 87 BPM | DIASTOLIC BLOOD PRESSURE: 79 MMHG | RESPIRATION RATE: 16 BRPM | OXYGEN SATURATION: 95 % | WEIGHT: 184 LBS | HEIGHT: 65 IN

## 2021-09-14 DIAGNOSIS — M54.16 LUMBAR RADICULOPATHY: ICD-10-CM

## 2021-09-14 DIAGNOSIS — Z98.890 S/P LAMINECTOMY: Primary | ICD-10-CM

## 2021-09-14 LAB — GLUCOSE BLDC GLUCOMTR-MCNC: 114 MG/DL (ref 70–99)

## 2021-09-14 PROCEDURE — 63047 LAM FACETEC & FORAMOT LUMBAR: CPT | Performed by: NEUROLOGICAL SURGERY

## 2021-09-14 PROCEDURE — 250N000025 HC SEVOFLURANE, PER MIN: Performed by: NEUROLOGICAL SURGERY

## 2021-09-14 PROCEDURE — 710N000012 HC RECOVERY PHASE 2, PER MINUTE: Performed by: NEUROLOGICAL SURGERY

## 2021-09-14 PROCEDURE — 250N000009 HC RX 250: Performed by: NEUROLOGICAL SURGERY

## 2021-09-14 PROCEDURE — 250N000011 HC RX IP 250 OP 636: Performed by: ANESTHESIOLOGY

## 2021-09-14 PROCEDURE — 272N000001 HC OR GENERAL SUPPLY STERILE: Performed by: NEUROLOGICAL SURGERY

## 2021-09-14 PROCEDURE — 710N000009 HC RECOVERY PHASE 1, LEVEL 1, PER MIN: Performed by: NEUROLOGICAL SURGERY

## 2021-09-14 PROCEDURE — 63047 LAM FACETEC & FORAMOT LUMBAR: CPT | Mod: AS | Performed by: NURSE PRACTITIONER

## 2021-09-14 PROCEDURE — 250N000011 HC RX IP 250 OP 636: Performed by: NEUROLOGICAL SURGERY

## 2021-09-14 PROCEDURE — 370N000017 HC ANESTHESIA TECHNICAL FEE, PER MIN: Performed by: NEUROLOGICAL SURGERY

## 2021-09-14 PROCEDURE — 250N000009 HC RX 250: Performed by: NURSE ANESTHETIST, CERTIFIED REGISTERED

## 2021-09-14 PROCEDURE — 360N000084 HC SURGERY LEVEL 4 W/ FLUORO, PER MIN: Performed by: NEUROLOGICAL SURGERY

## 2021-09-14 PROCEDURE — 250N000011 HC RX IP 250 OP 636: Performed by: NURSE ANESTHETIST, CERTIFIED REGISTERED

## 2021-09-14 PROCEDURE — 999N000141 HC STATISTIC PRE-PROCEDURE NURSING ASSESSMENT: Performed by: NEUROLOGICAL SURGERY

## 2021-09-14 PROCEDURE — 250N000013 HC RX MED GY IP 250 OP 250 PS 637: Performed by: ANESTHESIOLOGY

## 2021-09-14 PROCEDURE — 999N000179 XR SURGERY CARM FLUORO LESS THAN 5 MIN W STILLS

## 2021-09-14 PROCEDURE — 250N000011 HC RX IP 250 OP 636: Performed by: NURSE PRACTITIONER

## 2021-09-14 PROCEDURE — 258N000003 HC RX IP 258 OP 636: Performed by: NURSE ANESTHETIST, CERTIFIED REGISTERED

## 2021-09-14 RX ORDER — SODIUM CHLORIDE, SODIUM LACTATE, POTASSIUM CHLORIDE, CALCIUM CHLORIDE 600; 310; 30; 20 MG/100ML; MG/100ML; MG/100ML; MG/100ML
INJECTION, SOLUTION INTRAVENOUS CONTINUOUS PRN
Status: DISCONTINUED | OUTPATIENT
Start: 2021-09-14 | End: 2021-09-14

## 2021-09-14 RX ORDER — FENTANYL CITRATE 50 UG/ML
INJECTION, SOLUTION INTRAMUSCULAR; INTRAVENOUS PRN
Status: DISCONTINUED | OUTPATIENT
Start: 2021-09-14 | End: 2021-09-14

## 2021-09-14 RX ORDER — GLYCOPYRROLATE 0.2 MG/ML
INJECTION, SOLUTION INTRAMUSCULAR; INTRAVENOUS PRN
Status: DISCONTINUED | OUTPATIENT
Start: 2021-09-14 | End: 2021-09-14

## 2021-09-14 RX ORDER — LABETALOL HYDROCHLORIDE 5 MG/ML
5 INJECTION, SOLUTION INTRAVENOUS EVERY 5 MIN PRN
Status: DISCONTINUED | OUTPATIENT
Start: 2021-09-14 | End: 2021-09-14 | Stop reason: HOSPADM

## 2021-09-14 RX ORDER — LIDOCAINE HYDROCHLORIDE 20 MG/ML
INJECTION, SOLUTION INFILTRATION; PERINEURAL PRN
Status: DISCONTINUED | OUTPATIENT
Start: 2021-09-14 | End: 2021-09-14

## 2021-09-14 RX ORDER — ONDANSETRON 2 MG/ML
INJECTION INTRAMUSCULAR; INTRAVENOUS PRN
Status: DISCONTINUED | OUTPATIENT
Start: 2021-09-14 | End: 2021-09-14

## 2021-09-14 RX ORDER — HYDROMORPHONE HYDROCHLORIDE 1 MG/ML
0.3 INJECTION, SOLUTION INTRAMUSCULAR; INTRAVENOUS; SUBCUTANEOUS EVERY 5 MIN PRN
Status: DISCONTINUED | OUTPATIENT
Start: 2021-09-14 | End: 2021-09-14 | Stop reason: HOSPADM

## 2021-09-14 RX ORDER — PROPOFOL 10 MG/ML
INJECTION, EMULSION INTRAVENOUS CONTINUOUS PRN
Status: DISCONTINUED | OUTPATIENT
Start: 2021-09-14 | End: 2021-09-14

## 2021-09-14 RX ORDER — BUPIVACAINE HYDROCHLORIDE AND EPINEPHRINE 5; 5 MG/ML; UG/ML
INJECTION, SOLUTION PERINEURAL PRN
Status: DISCONTINUED | OUTPATIENT
Start: 2021-09-14 | End: 2021-09-14 | Stop reason: HOSPADM

## 2021-09-14 RX ORDER — FENTANYL CITRATE 50 UG/ML
50 INJECTION, SOLUTION INTRAMUSCULAR; INTRAVENOUS EVERY 5 MIN PRN
Status: DISCONTINUED | OUTPATIENT
Start: 2021-09-14 | End: 2021-09-14 | Stop reason: HOSPADM

## 2021-09-14 RX ORDER — SODIUM CHLORIDE, SODIUM LACTATE, POTASSIUM CHLORIDE, CALCIUM CHLORIDE 600; 310; 30; 20 MG/100ML; MG/100ML; MG/100ML; MG/100ML
INJECTION, SOLUTION INTRAVENOUS CONTINUOUS
Status: DISCONTINUED | OUTPATIENT
Start: 2021-09-14 | End: 2021-09-14 | Stop reason: HOSPADM

## 2021-09-14 RX ORDER — AMOXICILLIN 250 MG
1-2 CAPSULE ORAL 2 TIMES DAILY PRN
Qty: 20 TABLET | Refills: 0 | Status: SHIPPED | OUTPATIENT
Start: 2021-09-14 | End: 2021-10-26

## 2021-09-14 RX ORDER — PROPOFOL 10 MG/ML
INJECTION, EMULSION INTRAVENOUS PRN
Status: DISCONTINUED | OUTPATIENT
Start: 2021-09-14 | End: 2021-09-14

## 2021-09-14 RX ORDER — MEPERIDINE HYDROCHLORIDE 25 MG/ML
12.5 INJECTION INTRAMUSCULAR; INTRAVENOUS; SUBCUTANEOUS EVERY 5 MIN PRN
Status: DISCONTINUED | OUTPATIENT
Start: 2021-09-14 | End: 2021-09-14 | Stop reason: HOSPADM

## 2021-09-14 RX ORDER — OXYCODONE HYDROCHLORIDE 5 MG/1
5 TABLET ORAL EVERY 4 HOURS PRN
Status: DISCONTINUED | OUTPATIENT
Start: 2021-09-14 | End: 2021-09-14 | Stop reason: HOSPADM

## 2021-09-14 RX ORDER — HYDROXYZINE HYDROCHLORIDE 25 MG/1
25 TABLET, FILM COATED ORAL EVERY 6 HOURS PRN
Status: DISCONTINUED | OUTPATIENT
Start: 2021-09-14 | End: 2021-09-14 | Stop reason: HOSPADM

## 2021-09-14 RX ORDER — DEXAMETHASONE SODIUM PHOSPHATE 4 MG/ML
INJECTION, SOLUTION INTRA-ARTICULAR; INTRALESIONAL; INTRAMUSCULAR; INTRAVENOUS; SOFT TISSUE PRN
Status: DISCONTINUED | OUTPATIENT
Start: 2021-09-14 | End: 2021-09-14

## 2021-09-14 RX ORDER — ONDANSETRON 2 MG/ML
4 INJECTION INTRAMUSCULAR; INTRAVENOUS EVERY 30 MIN PRN
Status: DISCONTINUED | OUTPATIENT
Start: 2021-09-14 | End: 2021-09-14 | Stop reason: HOSPADM

## 2021-09-14 RX ORDER — OXYCODONE HYDROCHLORIDE 5 MG/1
5-10 TABLET ORAL EVERY 4 HOURS PRN
Qty: 40 TABLET | Refills: 0 | Status: SHIPPED | OUTPATIENT
Start: 2021-09-14 | End: 2021-10-26

## 2021-09-14 RX ORDER — ONDANSETRON 4 MG/1
4 TABLET, ORALLY DISINTEGRATING ORAL EVERY 30 MIN PRN
Status: DISCONTINUED | OUTPATIENT
Start: 2021-09-14 | End: 2021-09-14 | Stop reason: HOSPADM

## 2021-09-14 RX ORDER — CEFAZOLIN SODIUM 2 G/100ML
2 INJECTION, SOLUTION INTRAVENOUS SEE ADMIN INSTRUCTIONS
Status: DISCONTINUED | OUTPATIENT
Start: 2021-09-14 | End: 2021-09-14 | Stop reason: HOSPADM

## 2021-09-14 RX ORDER — NEOSTIGMINE METHYLSULFATE 1 MG/ML
VIAL (ML) INJECTION PRN
Status: DISCONTINUED | OUTPATIENT
Start: 2021-09-14 | End: 2021-09-14

## 2021-09-14 RX ORDER — CEFAZOLIN SODIUM 2 G/100ML
2 INJECTION, SOLUTION INTRAVENOUS
Status: COMPLETED | OUTPATIENT
Start: 2021-09-14 | End: 2021-09-14

## 2021-09-14 RX ADMIN — DEXMEDETOMIDINE 12 MCG: 100 INJECTION, SOLUTION, CONCENTRATE INTRAVENOUS at 08:47

## 2021-09-14 RX ADMIN — DEXMEDETOMIDINE 8 MCG: 100 INJECTION, SOLUTION, CONCENTRATE INTRAVENOUS at 09:01

## 2021-09-14 RX ADMIN — PROPOFOL 30 MCG/KG/MIN: 10 INJECTION, EMULSION INTRAVENOUS at 08:39

## 2021-09-14 RX ADMIN — ONDANSETRON 4 MG: 2 INJECTION INTRAMUSCULAR; INTRAVENOUS at 09:34

## 2021-09-14 RX ADMIN — NEOSTIGMINE METHYLSULFATE 5 MG: 1 INJECTION, SOLUTION INTRAVENOUS at 09:59

## 2021-09-14 RX ADMIN — SODIUM CHLORIDE, POTASSIUM CHLORIDE, SODIUM LACTATE AND CALCIUM CHLORIDE: 600; 310; 30; 20 INJECTION, SOLUTION INTRAVENOUS at 08:04

## 2021-09-14 RX ADMIN — ROCURONIUM BROMIDE 10 MG: 10 INJECTION INTRAVENOUS at 09:07

## 2021-09-14 RX ADMIN — CEFAZOLIN SODIUM 2 G: 2 INJECTION, SOLUTION INTRAVENOUS at 08:01

## 2021-09-14 RX ADMIN — FENTANYL CITRATE 50 MCG: 50 INJECTION, SOLUTION INTRAMUSCULAR; INTRAVENOUS at 10:23

## 2021-09-14 RX ADMIN — MIDAZOLAM 1 MG: 1 INJECTION INTRAMUSCULAR; INTRAVENOUS at 08:09

## 2021-09-14 RX ADMIN — LIDOCAINE HYDROCHLORIDE 80 MG: 20 INJECTION, SOLUTION INFILTRATION; PERINEURAL at 08:09

## 2021-09-14 RX ADMIN — FENTANYL CITRATE 50 MCG: 50 INJECTION, SOLUTION INTRAMUSCULAR; INTRAVENOUS at 08:09

## 2021-09-14 RX ADMIN — ROCURONIUM BROMIDE 40 MG: 10 INJECTION INTRAVENOUS at 08:09

## 2021-09-14 RX ADMIN — DEXAMETHASONE SODIUM PHOSPHATE 4 MG: 4 INJECTION, SOLUTION INTRA-ARTICULAR; INTRALESIONAL; INTRAMUSCULAR; INTRAVENOUS; SOFT TISSUE at 08:41

## 2021-09-14 RX ADMIN — ROCURONIUM BROMIDE 10 MG: 10 INJECTION INTRAVENOUS at 08:31

## 2021-09-14 RX ADMIN — FENTANYL CITRATE 50 MCG: 50 INJECTION, SOLUTION INTRAMUSCULAR; INTRAVENOUS at 10:39

## 2021-09-14 RX ADMIN — PROPOFOL 170 MG: 10 INJECTION, EMULSION INTRAVENOUS at 08:09

## 2021-09-14 RX ADMIN — OXYCODONE HYDROCHLORIDE 5 MG: 5 TABLET ORAL at 11:10

## 2021-09-14 RX ADMIN — FENTANYL CITRATE 50 MCG: 50 INJECTION, SOLUTION INTRAMUSCULAR; INTRAVENOUS at 08:31

## 2021-09-14 RX ADMIN — GLYCOPYRROLATE 0.8 MG: 0.2 INJECTION, SOLUTION INTRAMUSCULAR; INTRAVENOUS at 09:59

## 2021-09-14 ASSESSMENT — COPD QUESTIONNAIRES: COPD: 1

## 2021-09-14 ASSESSMENT — MIFFLIN-ST. JEOR: SCORE: 1402.56

## 2021-09-14 NOTE — ANESTHESIA PREPROCEDURE EVALUATION
"Anesthesia Pre-Procedure Evaluation    Patient: Pily Roche   MRN: [id number] : 1960        Preoperative Diagnosis: Lumbar radiculopathy [M54.16]   Procedure : Procedure(s):  Lumbar 4 to Lumbar 5 laminectomy/discectomy     Past Medical History:   Diagnosis Date     Advanced directives, counseling/discussion 2013    Patient does not have an Advance/Health Care Directive (HCD), given \"What is Advance Care Planning?\" flyer.  Maureen Iglesias 2013      Arthritis      Complication of anesthesia     slow to wake up, low BP after surgery     Disorders of bursae and tendons in shoulder region, unspecified 2010     Dysfunctional uterine bleeding      History of substance abuse (H)     cocaine, completed rehab     Hyperlipidemia      Insomnia      Lyme disease      Pain in shoulder 3/16/2010     Seasonal allergies      Tobacco use disorders      Type 2 diabetes, HbA1c goal < 7% (H) 2013      Past Surgical History:   Procedure Laterality Date     C SHOULDER SURG PROC UNLISTED  1992    X 3 (broken clavicle and rotator cuff tear with impingement     HC SACROPLASTY  's    Herniated L4-L5 X 2     HYSTERECTOMY, PAP NO LONGER INDICATED       HYSTERECTOMY, VAGINAL  05    Ovaries in Place      Allergies   Allergen Reactions     Quetiapine Anaphylaxis     Seroquel [Quetiapine Fumarate]      Insomnia  , anhedonia       Ambien [Zolpidem Tartrate] Difficulty breathing     Reaction after taking 5 mg on Wed, Thurs, then drank Friday afternoon and developed symptoms,  Irrability.       Chantix [Varenicline]      Zolpidem Unknown      Social History     Tobacco Use     Smoking status: Current Every Day Smoker     Packs/day: 1.50     Years: 30.00     Pack years: 45.00     Types: Cigarettes     Smokeless tobacco: Never Used     Tobacco comment: 1/2 pack to 1 ppd, has an ecig   Substance Use Topics     Alcohol use: Yes     Alcohol/week: 5.0 - 10.0 standard drinks     Types: 5 - 10 " Standard drinks or equivalent per week     Comment: occasionally      Wt Readings from Last 1 Encounters:   09/14/21 83.5 kg (184 lb)        Anesthesia Evaluation            ROS/MED HX  ENT/Pulmonary: Comment: Has had trouble with chocking after anesthesia     (+) COPD,  (-) sleep apnea   Neurologic:    (-) migraines   Cardiovascular:     (+) Dyslipidemia hypertension-----    METS/Exercise Tolerance:     Hematologic:       Musculoskeletal:       GI/Hepatic:     (+) GERD,     Renal/Genitourinary:     (+) renal disease, type: CRI,     Endo:     (+) type II DM, Obesity,     Psychiatric/Substance Use:     (+) psychiatric history anxiety and depression     Infectious Disease:       Malignancy:       Other:      (+) , H/O Chronic Pain,        Physical Exam    Airway        Mallampati: III   TM distance: > 3 FB   Neck ROM: full     Respiratory Devices and Support         Dental       (+) missing      Cardiovascular   cardiovascular exam normal          Pulmonary           breath sounds clear to auscultation           OUTSIDE LABS:  CBC:   Lab Results   Component Value Date    WBC 9.3 09/08/2021    WBC 8.5 12/21/2016    HGB 14.3 09/08/2021    HGB 13.4 12/21/2016    HCT 43.5 09/08/2021    HCT 41.8 12/21/2016     09/08/2021     12/21/2016     BMP:   Lab Results   Component Value Date     09/08/2021     06/21/2021    POTASSIUM 4.2 09/08/2021    POTASSIUM 5.0 06/21/2021    CHLORIDE 108 09/08/2021    CHLORIDE 109 06/21/2021    CO2 26 09/08/2021    CO2 26 06/21/2021    BUN 18 09/08/2021    BUN 13 06/21/2021    CR 0.70 09/08/2021    CR 0.61 06/21/2021     (H) 09/14/2021     (H) 09/08/2021     COAGS:   Lab Results   Component Value Date    INR 1.00 03/15/2010     POC: No results found for: BGM, HCG, HCGS  HEPATIC:   Lab Results   Component Value Date    ALBUMIN 4.1 09/08/2021    PROTTOTAL 7.7 09/08/2021    ALT 48 09/08/2021    AST 31 09/08/2021    ALKPHOS 104 09/08/2021    BILITOTAL 0.3  09/08/2021     OTHER:   Lab Results   Component Value Date    A1C 6.5 (H) 09/08/2021    AFSHAN 9.3 09/08/2021    TSH 1.13 06/10/2019    SED 12 04/08/2010       Anesthesia Plan    ASA Status:  2      Anesthesia Type: General.     - Airway: ETT              Consents    Anesthesia Plan(s) and associated risks, benefits, and realistic alternatives discussed. Questions answered and patient/representative(s) expressed understanding.     - Discussed with:  Patient         Postoperative Care       PONV prophylaxis: Ondansetron (or other 5HT-3), Dexamethasone or Solumedrol, Background Propofol Infusion     Comments:                Cindy Cordoba

## 2021-09-14 NOTE — BRIEF OP NOTE
[facility] Brief Operative Note    Pre-operative diagnosis: Lumbar radiculopathy [M54.16]   Post-operative diagnosis Same as above   Procedure: Procedure(s):  Lumbar L4-L5 laminectomy/discectomy   Surgeon(s): Surgeon(s) and Role:     * Ian Lomeli MD - Primary   Estimated blood loss: 25 mL    Specimens: * No specimens in log *   Findings: See op note

## 2021-09-14 NOTE — ANESTHESIA CARE TRANSFER NOTE
Patient: Pily Roche    Procedure(s):  Lumbar L4-L5 laminectomy/discectomy    Diagnosis: Lumbar radiculopathy [M54.16]  Diagnosis Additional Information: No value filed.    Anesthesia Type:   General     Note:    Oropharynx: oropharynx clear of all foreign objects and spontaneously breathing  Level of Consciousness: awake  Oxygen Supplementation: face mask  Level of Supplemental Oxygen (L/min / FiO2): 6  Independent Airway: airway patency satisfactory and stable  Dentition: dentition unchanged  Vital Signs Stable: post-procedure vital signs reviewed and stable  Report to RN Given: handoff report given  Patient transferred to: PACU  Comments: Neuromuscular blockade reversed after TOF 4/4, spontaneous respirations, adequate tidal volumes, followed commands to voice, oropharynx suctioned with soft flexible catheter, extubated atraumatically, extubated with suction, airway patent after extubation.  Oxygen via facemask at 6 liters per minute to PACU. Oxygen tubing connected to wall O2 in PACU, SpO2, NiBP, and EKG monitors and alarms on and functioning, Saray Hugger warmer connected to patient gown, report on patient's clinical status given to PACU RN, RN questions answered.     Handoff Report: Identifed the Patient, Identified the Reponsible Provider, Reviewed the pertinent medical history, Discussed the surgical course, Reviewed Intra-OP anesthesia mangement and issues during anesthesia, Set expectations for post-procedure period and Allowed opportunity for questions and acknowledgement of understanding      Vitals:  Vitals Value Taken Time   /79 09/14/21 1010   Temp     Pulse 89 09/14/21 1013   Resp 22 09/14/21 1013   SpO2 98 % 09/14/21 1013   Vitals shown include unvalidated device data.    Electronically Signed By: BASSAM Richardson CRNA  September 14, 2021  10:13 AM

## 2021-09-14 NOTE — ANESTHESIA PROCEDURE NOTES
Airway       Patient location during procedure: OR       Procedure Start/Stop Times: 9/14/2021 8:11 AM  Staff -        CRNA: Desire Tam APRN CRNA       Performed By: CRNA  Consent for Airway        Urgency: elective  Indications and Patient Condition       Indications for airway management: aliyah-procedural       Induction type:intravenous       Mask difficulty assessment: 2 - vent by mask + OA or adjuvant +/- NMBA    Final Airway Details       Final airway type: endotracheal airway       Successful airway: ETT - single  Endotracheal Airway Details        ETT size (mm): 7.0       Cuffed: yes       Successful intubation technique: video laryngoscopy       VL Blade Size: Glidescope 3       Grade View of Cords: 1       Adjucts: stylet       Position: Right       Measured from: lips       Secured at (cm): 23       Bite block used: None    Post intubation assessment        Placement verified by: capnometry, equal breath sounds and chest rise        Number of attempts at approach: 1       Secured with: pink tape       Ease of procedure: easy       Dentition: Unchanged

## 2021-09-14 NOTE — OP NOTE
Date of surgery: 9/14/2021  Surgeon: Ian Lomeli MD  Assistant: Claire Little NP  Note: Claire Little was present for and assisted with the entire surgery, and his/her role as an assistant was crucial for aid in positioning, exposure, suctioning, retraction, and closure    Preoperative diagnosis: Lumbar stenosis  Postoperative diagnosis: Lumbar stenosis    Procedure:  1.  L4-5 bilateral laminectomy and medial facetectomy for decompression of central stenosis  3.  Use of intraoperative microscope and fluoroscopy    EBL: 25 mL    Indications: 60-year-old female who presented with intractable back and leg pain.  MRI showed severe stenosis at L4-5.  Underwent non-operative management with failure to improve.  Risks, benefits, indications, and alternatives were discussed with the patient in detail, and she wished to proceed.    Description of surgery: The patient was positioned prone.  Sterile prepping and draping procedures were performed.  Antibiotics were administered and timeout was performed.  A midline lumbar incision was performed.  The monopolar was used to expose the spinous processes, lamina, and medial facets at L4-5.  Fluoroscopy was used to verify the correct level.  The high speed drill was then used to perform bilateral laminectomies and medial facetectomies at L4-5 with preservation of the midline tension band.  The Kerrison rongeurs were then used to remove the Ligamentum flavum at L4-5, with decompression of the thecal sac and nerve roots.  Limited bilateral discectomy was performed with the pituitary rongeurs.  Antibiotic irrigation was performed and hemostasis achieved.  The fascia was closed with 0-Vicryl sutures, and the dermal layer was closed with 2-0 vicryl sutures.  There were no intraprocedural complications.

## 2021-09-14 NOTE — ANESTHESIA POSTPROCEDURE EVALUATION
Patient: Pily Roche    Procedure(s):  Lumbar L4-L5 laminectomy/discectomy    Diagnosis:Lumbar radiculopathy [M54.16]  Diagnosis Additional Information: No value filed.    Anesthesia Type:  General    Note:  Disposition: Outpatient   Postop Pain Control: Uneventful            Sign Out: Well controlled pain   PONV: No   Neuro/Psych: Uneventful            Sign Out: Acceptable/Baseline neuro status   Airway/Respiratory: Uneventful            Sign Out: Acceptable/Baseline resp. status   CV/Hemodynamics: Uneventful            Sign Out: Acceptable CV status   Other NRE:    DID A NON-ROUTINE EVENT OCCUR? No           Last vitals:  Vitals Value Taken Time   /67 09/14/21 1100   Temp 35.9  C (96.7  F) 09/14/21 1045   Pulse 87 09/14/21 1114   Resp 10 09/14/21 1114   SpO2 96 % 09/14/21 1114   Vitals shown include unvalidated device data.    Electronically Signed By: Cindy Cordoba  September 14, 2021  4:52 PM

## 2021-09-14 NOTE — DISCHARGE INSTRUCTIONS
Same Day Surgery Discharge Instructions for  Sedation and General Anesthesia       It's not unusual to feel dizzy, light-headed or faint for up to 24 hours after surgery or while taking pain medication.  If you have these symptoms: sit for a few minutes before standing and have someone assist you when you get up to walk or use the bathroom.      You should rest and relax for the next 24 hours. We recommend you make arrangements to have an adult stay with you for at least 24 hours after your discharge.  Avoid hazardous and strenuous activity.      DO NOT DRIVE any vehicle or operate mechanical equipment for 24 hours following the end of your surgery.  Even though you may feel normal, your reactions may be affected by the medication you have received.      Do not drink alcoholic beverages for 24 hours following surgery.       Slowly progress to your regular diet as you feel able. It's not unusual to feel nauseated and/or vomit after receiving anesthesia.  If you develop these symptoms, drink clear liquids (apple juice, ginger ale, broth, 7-up, etc. ) until you feel better.  If your nausea and vomiting persists for 24 hours, please notify your surgeon.        All narcotic pain medications, along with inactivity and anesthesia, can cause constipation. Drinking plenty of liquids and increasing fiber intake will help.      For any questions of a medical nature, call your surgeon.      Do not make important decisions for 24 hours.      If you had general anesthesia, you may have a sore throat for a couple of days related to the breathing tube used during surgery.  You may use Cepacol lozenges to help with this discomfort.  If it worsens or if you develop a fever, contact your surgeon.       If you feel your pain is not well managed with the pain medications prescribed by your surgeon, please contact your surgeon's office to let them know so they can address your concerns.       CoVid 19 Information    We want to give you  information regarding Covid. Please consult your primary care provider with any questions you might have.     Patient who have symptoms (cough, fever, or shortness of breath), need to isolate for 7 days from when symptoms started OR 72 hours after fever resolves (without fever reducing medications) AND improvement of respiratory symptoms (whichever is longer).      Isolate yourself at home (in own room/own bathroom if possible)    Do Not allow any visitors    Do Not go to work or school    Do Not go to Religion,  centers, shopping, or other public places.    Do Not shake hands.    Avoid close and intimate contact with others (hugging, kissing).    Follow CDC recommendations for household cleaning of frequently touched services.     After the initial 7 days, continue to isolate yourself from household members as much as possible. To continue decrease the risk of community spread and exposure, you and any members of your household should limit activities in public for 14 days after starting home isolation.     You can reference the following CDC link for helpful home isolation/care tips:  https://www.cdc.gov/coronavirus/2019-ncov/downloads/10Things.pdf    Protect Others:    Cover Your Mouth and Nose with a mask, disposable tissue or wash cloth to avoid spreading germs to others.    Wash your hands and face frequently with soap and water    Call Your Primary Doctor If: Breathing difficulty develops or you become worse.    For more information about COVID19 and options for caring for yourself at home, please visit the CDC website at https://www.cdc.gov/coronavirus/2019-ncov/about/steps-when-sick.html  For more options for care at [facility], please visit our website at [email or web address]    Spine and Brain Clinic at [facility]  Dr. Lomeli Discharge Instructions Following Spine Surgery  [phone]  Monday - Friday; 8:00 AM - 4:00 PM    In General:   After you  have had surgery on your spine, remember do not twist, or excessively flex or extend the area that you had surgery.  These activities can prevent healing.  Pain is normal and to be expected following surgery.  Please call our office to schedule your appointment follow up appointment.      Bowel Care:  Many people have constipation (hard stools) after surgery.  To help prevent constipation: Drink plenty of fluid (8-10 glasses/day); Eat more fiber, such as whole grain bread, bran cereal, and fruits and vegetables; Stay active by walking; Over the counter stool softener may also help.      Medications:  Spine surgery and pain management is unique to all patients.  You will generally be given medications for pain, muscle spasms or tightness, and for constipation during the immediate post op period.  It is important that you use these as prescribed.  Please remember to bring your pill bottles to all of your appointments. Avoid driving while taking narcotic pain medications.  Avoid alcoholic beverages while taking narcotic pain medications. You can use ice to areas of pain as needed, 20 minutes at a time.  Changing positions and walking will help loosen your muscles as well.  No NSAIDs (Ibuprofen, Advil, Motrin, Aleve, Naproxen) for 7 days.    Driving:  No driving while on narcotic pain medications.  It is state law not to drive while under the influence of a drug to a degree which renders you incapable of safely driving.  The narcotic medication you will be taking after surgery falls under this category.     Activity:   After surgery, most people feel less pain than they have had in a long time.  Walking and light activities will help you regain the use of your muscles.  You are encouraged to walk: start with short walks 5-10 minutes at a time for 4-5 times per day and increase as tolerated.  Stair climbing as tolerated, we recommend you use the railing. No lifting greater than 10 pounds: approximately equal to one  gallon of milk. No twisting, bending in the area you have had surgery. No housework, vacuuming, laundry, leaf raking, lawn mowing, or snow removal. Wear your brace (if ordered) as directed.    Showers:  If you have sutures or staples you may shower two days after surgery. It is ok to let water run over your incision but do not touch or scrub on the incision. Pat dry immediately after showering. If there is a dressing in place, you may remove it 2 days after surgery. If you were closed with Derma bangura (glue), you may shower without covering the incision. No baths, hot tubs, or pool activity for at least 6 weeks.     Nutrition:  In general, your diet restrictions will not change with your surgery.  You may need to eat small frequent meals initially until your appetite returns.  Eat plenty of high fiber foods and drink plenty of fluids. If you do not have a fluid restriction from or prior to surgery, we recommend 6-8 (8oz) glasses of water per day. Other fluids are fine, but water is best. Nausea is not uncommon; it is a common side effect to many pain medications.  We recommend that you take the pain medications with food, if this does not improve your symptoms, please call us.     Smoking:  For proper healing it is required that you quit using all tobacco products.  This includes smoking, chewing, nicotine gums, and nicotine patches.  Follow-Up Appointment  Neurosurgery Appointment with Deidra Davidson PA-C or Skinny Lawrence PA-C at the clinic in 6 weeks.  Call [phone] for appointment.  Call Dr. Lomeli at [phone] if these occur: Drainage from your incision, increased pain/redness/swelling, temperatures greater than 101.5, increased leg pain or swelling or unrelieved headaches    Go to the nearest Emergency Room if you experience: chest pain, shortness of breath, neck swelling or swallowing problems

## 2021-09-15 ENCOUNTER — TELEPHONE (OUTPATIENT)
Dept: NEUROSURGERY | Facility: CLINIC | Age: 61
End: 2021-09-15

## 2021-09-15 DIAGNOSIS — Z98.890 S/P LUMBAR LAMINECTOMY: Primary | ICD-10-CM

## 2021-09-15 RX ORDER — CYCLOBENZAPRINE HCL 5 MG
5-10 TABLET ORAL 3 TIMES DAILY PRN
Qty: 30 TABLET | Refills: 0 | Status: SHIPPED | OUTPATIENT
Start: 2021-09-15 | End: 2021-10-07

## 2021-09-15 NOTE — TELEPHONE ENCOUNTER
Post-Op Call    DOS: 9/14/21  Surgery:  L4-5 bilateral laminectomy and medial facetectomy for decompression of central stenosis  Surgeon: Dr. Lomeli    Called patient for post-operative follow-up.     Rates pain 7/10. Pain in low back, incisional pain, denies any leg pain. Denies any T/N or weakness to BLE.  Discussed weaning from pain medication as pain improves. Provided education about maximum acetaminophen dose in 24 hours from all sources. Reinforced need to hold NSAIDs for 2  weeks post-op. Encouraged to routinely apply ice and ok to take tylenol with or in between oxy doses. Patient had left over Robaxin but she said this is not effective. Will discuss with care team to switch her to Flexeril which she's had in the past and was helpful. If approved, would like it sent to [facility] in [place].     Patient is walking without difficulty. Encouraged short, frequent walks as tolerated.     Patient has not had a bowel movement since surgery. Discussed reasons of constipation after surgery and reinforced treatment options.     Patient's dressing has not been removed. Patient denies any signs or symptoms of infection. Incision care reinforced.     Reviewed follow up appointments and clinic contact information. Patient will call with any questions or concerns.

## 2021-09-15 NOTE — TELEPHONE ENCOUNTER
Ok per Lala Fountain, PASCUAL to prescribe flexeril 5-10 mg TID. Rx sent to pharmacy and patient notified.

## 2021-10-07 ENCOUNTER — OFFICE VISIT (OUTPATIENT)
Dept: NEUROSURGERY | Facility: CLINIC | Age: 61
End: 2021-10-07
Payer: COMMERCIAL

## 2021-10-07 VITALS
HEART RATE: 80 BPM | TEMPERATURE: 98.3 F | BODY MASS INDEX: 31.23 KG/M2 | SYSTOLIC BLOOD PRESSURE: 110 MMHG | OXYGEN SATURATION: 93 % | DIASTOLIC BLOOD PRESSURE: 72 MMHG | WEIGHT: 184.8 LBS

## 2021-10-07 DIAGNOSIS — Z98.890 S/P LUMBAR LAMINECTOMY: Primary | ICD-10-CM

## 2021-10-07 PROCEDURE — 99207 PR NO CHARGE NURSE ONLY: CPT

## 2021-10-07 RX ORDER — CYCLOBENZAPRINE HCL 5 MG
5 TABLET ORAL 3 TIMES DAILY PRN
Qty: 30 TABLET | Refills: 0 | Status: SHIPPED | OUTPATIENT
Start: 2021-10-07 | End: 2022-01-04

## 2021-10-07 ASSESSMENT — PAIN SCALES - GENERAL: PAINLEVEL: MILD PAIN (2)

## 2021-10-07 NOTE — NURSING NOTE
"Pily Roche is a 60 year old female who presents for:  Chief Complaint   Patient presents with     Neurologic Problem     2 week post op nurse visit        Initial Vitals:  Pulse 80   Temp 98.3  F (36.8  C)   Wt 184 lb 12.8 oz (83.8 kg)   SpO2 93%   BMI 31.23 kg/m   Estimated body mass index is 31.23 kg/m  as calculated from the following:    Height as of 9/14/21: 5' 4.5\" (1.638 m).    Weight as of this encounter: 184 lb 12.8 oz (83.8 kg).. Body surface area is 1.95 meters squared. BP completed using cuff size: NA (Not Taken) Patient preferred manual B/P. RN will take it and record the result.  Mild Pain (2)    Jarrod Jensen MA    "

## 2021-10-07 NOTE — PROGRESS NOTES
Post-op Nurse Visit:    Patient presents today s/p L4-5 laminectomy/discectomy on 9/14/21 per the order of Dr. Lomeli.     Pain/Neuro Assessment  3/10 to low back and anterior hip bones described as throbbing and aching. Low back pain increases when standing more than 10 minutes. New pain to anterior hip bone fading from surgery.   Numbness/tingling: denies. Was having right posterior n/t down to knee. Better now.  Strengh: equal strength to bilateral lower extremities     Pain Relief Measures:  Oxycodone: ran out last week  Tylenol: 1000mg q6h. Not taking regularly  Advil: 800mg  TID.  flexeril: ran out last week. Worked well.  Gabapentin: 3 tab TID  Asa: has not restarted. Spoke with Claire Diamond CNP who ok'd restart of this med.   Ice: makes it worse. Not using heat.     Incision   Incision inspected. Edges well-approximated. No redness, swelling, drainage, or warmth noted. There was a tale of a suture at the top of incision, writer snipped down to the skin.    Assessment  Activity: not always following restrictions. Has wedding this weekend. Reinforced restrictions.  Patient is walking frequently without difficulty.     Patient's appetite is normal  Bowel/bladder problems? No  Taking stool softeners? Yes     Refills given at this appointment? Yes  Return to work discussed at this appointment? Yes. Not employed    All of patient's questions addressed today. She was instructed to call with any additional questions/concerns.     Instructions for Patient    Refill for flexeril    Stop taking advil until 6 week office visit    Start taking tylenol on a schedule    Ok to restart aspirin    Keep your incision clean and dry at all times.     Showing is ok. No submerging incision under water such as baths, hot tubs, or swimming for the first 30 days and the incision is well healed.     No lifting greater than 10 pounds. No bending, twisting, or overhead reaching.    Walking: Walking is the best way to start exercise  after surgery. Take short frequent walks. You may gradually increase the distance as tolerated. If you feel pain, decrease your activity, but DO NOT stop walking. Walking will help you gain strength, prevent muscle soreness and spasms, and prevent blood clots.     Weaning from narcotic pain medications    When it is time, start weaning by extending the time between doses.     For example, if you're taking 2 tabs every 4 hours, spread it out to 2 tabs over 4.5, 5, 6 hours.     At that point you can certainly cut down to 1 tab, then wean to an as needed basis until completely done with them.    Medication refills: call our clinic 2-3 business days before you are out of medication. A nurse will call you back to obtain a pain assessment.     Don't take more than 3000mg of Acetaminophen in 24 hours    Encouraged icing for at least 1-2 times throughout the day for 20-30 minutes at a time. Avoid heat to the incision area.     Taking stool softeners regularly can reduce constipation commonly caused by narcotic pain medications.    Contact clinic right away if you develop:     Infection    New injury    Bladder or bowel changes or loss of control      Signs of blood clot:  o Swelling and/or warmth in one or both legs  o Pain or tenderness in your leg, ankle, foot, or arm   o Red or discolored skin     Go to the Emergency Room     If sudden onset of severe headache, weakness, confusion, change in level of consciousness, pain, or loss of movement.    Chest pain or trouble breathing     Post-operative appointments    6 week post op, 3 months post op  Jessenia BAEZ RN  [facility]  [facility]  [address] [place] [address]  Telephone:  [phone]   Fax:  [phone]

## 2021-10-07 NOTE — PATIENT INSTRUCTIONS
Instructions for Patient    Refill for flexeril    Stop taking advil until 6 week office visit    Start taking tylenol on a schedule    Ok to restart aspirin    Keep your incision clean and dry at all times.     Showing is ok. No submerging incision under water such as baths, hot tubs, or swimming for the first 30 days and the incision is well healed.     No lifting greater than 10 pounds. No bending, twisting, or overhead reaching.    Walking: Walking is the best way to start exercise after surgery. Take short frequent walks. You may gradually increase the distance as tolerated. If you feel pain, decrease your activity, but DO NOT stop walking. Walking will help you gain strength, prevent muscle soreness and spasms, and prevent blood clots.     Weaning from narcotic pain medications    When it is time, start weaning by extending the time between doses.     For example, if you're taking 2 tabs every 4 hours, spread it out to 2 tabs over 4.5, 5, 6 hours.     At that point you can certainly cut down to 1 tab, then wean to an as needed basis until completely done with them.    Medication refills: call our clinic 2-3 business days before you are out of medication. A nurse will call you back to obtain a pain assessment.     Don't take more than 3000mg of Acetaminophen in 24 hours    Encouraged icing for at least 1-2 times throughout the day for 20-30 minutes at a time. Avoid heat to the incision area.     Taking stool softeners regularly can reduce constipation commonly caused by narcotic pain medications .    Contact clinic right away if you develop:     Infection    New injury    Bladder or bowel changes or loss of control      Signs of blood clot:  o Swelling and/or warmth in one or both legs  o Pain or tenderness in your leg, ankle, foot, or arm   o Red or discolored skin     Go to the Emergency Room     If sudden onset of severe headache, weakness, confusion, change in level of consciousness, pain, or loss of  movement.    Chest pain or trouble breathing     Post-operative appointments    6 week post op, 3 months post op  Jessenia BAEZ RN  [facility]  [facility]  [address] [place] [address]  Telephone:  [phone]   Fax:  [phone]

## 2021-10-26 ENCOUNTER — OFFICE VISIT (OUTPATIENT)
Dept: NEUROSURGERY | Facility: CLINIC | Age: 61
End: 2021-10-26
Payer: COMMERCIAL

## 2021-10-26 VITALS
OXYGEN SATURATION: 92 % | HEIGHT: 65 IN | HEART RATE: 120 BPM | BODY MASS INDEX: 31.25 KG/M2 | DIASTOLIC BLOOD PRESSURE: 106 MMHG | SYSTOLIC BLOOD PRESSURE: 169 MMHG | WEIGHT: 187.6 LBS

## 2021-10-26 DIAGNOSIS — Z98.890 S/P LUMBAR LAMINECTOMY: Primary | ICD-10-CM

## 2021-10-26 PROCEDURE — 99024 POSTOP FOLLOW-UP VISIT: CPT | Performed by: NURSE PRACTITIONER

## 2021-10-26 ASSESSMENT — MIFFLIN-ST. JEOR: SCORE: 1418.89

## 2021-10-26 ASSESSMENT — PAIN SCALES - GENERAL: PAINLEVEL: MILD PAIN (2)

## 2021-10-26 NOTE — LETTER
"    10/26/2021         RE: Pily Roche  [address] [address] [facility] [address]        Dear Colleague,    Thank you for referring your patient, Pily Roche, to the [facility]. Please see a copy of my visit note below.    [facility] Neurosurgery  Neurosurgery Follow Up:    HPI: 6 weeks s/p L4-5 laminectomy/discectomy with Dr. Lomeli on 9/14/2021. Doing \"great.\" Some back pain when she overdoes it. Minimal leg pain. No paresthesias or weakness. Incision intact. Ready to increase activities.     Medical, surgical, family, and social history unchanged since prior exam.  Exam:  Constitutional:  Alert, well nourished, NAD.  HEENT: Normocephalic, atraumatic.   Pulm:  Without shortness of breath   CV:  No pitting edema of BLE.      Vital Signs:  BP (!) 169/106   Pulse 120   Ht 5' 4.5\" (1.638 m)   Wt 187 lb 9.6 oz (85.1 kg)   SpO2 92%   BMI 31.70 kg/m      Neurological:  Awake  Alert  Oriented x 3  Motor exam:        IP Q DF PF EHL  R   5  5   5   5    5  L   5  5   5   5    5     Able to spontaneously move L/E bilaterally  Sensation intact throughout all L/E dermatomes     Incisions:  Healing nicely.  A/P: s/p lumbar laminectomy/discectomy. Routine post operative cares.   Patient Instructions   - May increase lifting restriction to 20-25 pounds.  - Follow up in 6 weeks.   - Call the clinic at [phone] for increased pain or any other questions and concerns.    Lala Fountain, SADIE  [facility]surgery  [address] [place] [address]  [address] [place] [address]  Tel [phone]  Fax [phone]        Again, thank you for allowing me to participate in the care of your patient.        Sincerely,        Lala Fountain, NP    "

## 2021-10-26 NOTE — PROGRESS NOTES
"[facility] Neurosurgery  Neurosurgery Follow Up:    HPI: 6 weeks s/p L4-5 laminectomy/discectomy with Dr. Lomeli on 9/14/2021. Doing \"great.\" Some back pain when she overdoes it. Minimal leg pain. No paresthesias or weakness. Incision intact. Ready to increase activities.     Medical, surgical, family, and social history unchanged since prior exam.  Exam:  Constitutional:  Alert, well nourished, NAD.  HEENT: Normocephalic, atraumatic.   Pulm:  Without shortness of breath   CV:  No pitting edema of BLE.      Vital Signs:  BP (!) 169/106   Pulse 120   Ht 5' 4.5\" (1.638 m)   Wt 187 lb 9.6 oz (85.1 kg)   SpO2 92%   BMI 31.70 kg/m      Neurological:  Awake  Alert  Oriented x 3  Motor exam:        IP Q DF PF EHL  R   5  5   5   5    5  L   5  5   5   5    5     Able to spontaneously move L/E bilaterally  Sensation intact throughout all L/E dermatomes     Incisions:  Healing nicely.  A/P: s/p lumbar laminectomy/discectomy. Routine post operative cares.   Patient Instructions   - May increase lifting restriction to 20-25 pounds.  - Follow up in 6 weeks.   - Call the clinic at [phone] for increased pain or any other questions and concerns.    Lala Fountain, SADIE  [facility]surgery  [address] [place] [address]  [address] [place] [address]  Tel [phone]  Fax [phone]    "

## 2021-10-26 NOTE — PATIENT INSTRUCTIONS
- May increase lifting restriction to 20-25 pounds.  - Follow up in 6 weeks.   - Call the clinic at [phone] for increased pain or any other questions and concerns.

## 2021-10-26 NOTE — NURSING NOTE
"Pily Roche is a 60 year old female who presents for:  Chief Complaint   Patient presents with     Surgical Followup     L4-5 laminectomy/discectomy. DOS 9/14/21, 6 wk s/p. Patient notes her back is doing fine. She may ice during the day once in a while. No issues or concerns.        Vitals:    Vitals:    10/26/21 0953   BP: (!) 169/106   Pulse: 120   SpO2: 92%   Weight: 187 lb 9.6 oz (85.1 kg)   Height: 5' 4.5\" (1.638 m)       BMI:  Estimated body mass index is 31.7 kg/m  as calculated from the following:    Height as of this encounter: 5' 4.5\" (1.638 m).    Weight as of this encounter: 187 lb 9.6 oz (85.1 kg).    Pain Score:  Mild Pain (2)        NAYANA Lao to follow up with Primary Care provider regarding elevated blood pressure.    "

## 2021-11-20 DIAGNOSIS — M25.519 CHRONIC SHOULDER PAIN, UNSPECIFIED LATERALITY: ICD-10-CM

## 2021-11-20 DIAGNOSIS — G89.29 CHRONIC SHOULDER PAIN, UNSPECIFIED LATERALITY: ICD-10-CM

## 2021-11-23 NOTE — TELEPHONE ENCOUNTER
Requested Prescriptions   Pending Prescriptions Disp Refills     methocarbamol (ROBAXIN) 500 MG tablet [Pharmacy Med Name: METHOCARBAM 500MG] 180 tablet 0     Sig: TAKE 2 TABLETS BY MOUTH THREE TIMES DAILY AS NEEDED MUSCLE SPASMS       There is no refill protocol information for this order        Routing refill request to provider for review/approval because:  Drug not on the G refill protocol

## 2021-11-24 RX ORDER — METHOCARBAMOL 500 MG/1
TABLET, FILM COATED ORAL
Qty: 180 TABLET | Refills: 1 | Status: SHIPPED | OUTPATIENT
Start: 2021-11-24 | End: 2022-05-04

## 2021-12-27 DIAGNOSIS — E11.22 TYPE 2 DIABETES MELLITUS WITH STAGE 2 CHRONIC KIDNEY DISEASE, WITHOUT LONG-TERM CURRENT USE OF INSULIN (H): ICD-10-CM

## 2021-12-27 DIAGNOSIS — N18.2 TYPE 2 DIABETES MELLITUS WITH STAGE 2 CHRONIC KIDNEY DISEASE, WITHOUT LONG-TERM CURRENT USE OF INSULIN (H): ICD-10-CM

## 2021-12-27 DIAGNOSIS — F33.1 MAJOR DEPRESSIVE DISORDER, RECURRENT EPISODE, MODERATE (H): ICD-10-CM

## 2021-12-28 NOTE — TELEPHONE ENCOUNTER
Routing refill request to provider for review/approval because:  BP Readings from Last 3 Encounters:   10/26/21 (!) 169/106   10/07/21 110/72   09/14/21 112/79

## 2021-12-31 ENCOUNTER — TELEPHONE (OUTPATIENT)
Dept: DERMATOLOGY | Facility: CLINIC | Age: 61
End: 2021-12-31
Payer: COMMERCIAL

## 2021-12-31 RX ORDER — METFORMIN HCL 500 MG
2000 TABLET, EXTENDED RELEASE 24 HR ORAL
Qty: 360 TABLET | Refills: 0 | Status: SHIPPED | OUTPATIENT
Start: 2021-12-31 | End: 2022-05-11

## 2021-12-31 RX ORDER — TRAZODONE HYDROCHLORIDE 100 MG/1
TABLET ORAL
Qty: 180 TABLET | Refills: 0 | Status: SHIPPED | OUTPATIENT
Start: 2021-12-31 | End: 2022-05-10

## 2021-12-31 NOTE — TELEPHONE ENCOUNTER
12/31 2nd attempt. Provided phone number [phone] to schedule follow up in about 1 year (around 2/24/2022).    Ashanti [facility] Procedure   Orthopedics, Podiatry, Sports Medicine, ENT/Eye Specialties  [facility]   [phone]

## 2022-01-04 ENCOUNTER — OFFICE VISIT (OUTPATIENT)
Dept: NEUROSURGERY | Facility: CLINIC | Age: 62
End: 2022-01-04
Payer: COMMERCIAL

## 2022-01-04 VITALS
HEART RATE: 88 BPM | SYSTOLIC BLOOD PRESSURE: 142 MMHG | DIASTOLIC BLOOD PRESSURE: 83 MMHG | WEIGHT: 185 LBS | RESPIRATION RATE: 18 BRPM | HEIGHT: 65 IN | BODY MASS INDEX: 30.82 KG/M2

## 2022-01-04 DIAGNOSIS — Z98.890 S/P LUMBAR LAMINECTOMY: ICD-10-CM

## 2022-01-04 DIAGNOSIS — M53.3 SACROILIAC JOINT PAIN: Primary | ICD-10-CM

## 2022-01-04 PROCEDURE — 99024 POSTOP FOLLOW-UP VISIT: CPT | Performed by: NURSE PRACTITIONER

## 2022-01-04 RX ORDER — CYCLOBENZAPRINE HCL 5 MG
5-10 TABLET ORAL 3 TIMES DAILY PRN
Qty: 30 TABLET | Refills: 0 | Status: SHIPPED | OUTPATIENT
Start: 2022-01-04 | End: 2022-06-01

## 2022-01-04 ASSESSMENT — MIFFLIN-ST. JEOR: SCORE: 1402.09

## 2022-01-04 NOTE — PROGRESS NOTES
"[facility]  Neurosurgery Follow Up:    HPI: 3.5 months s/p L4-5 laminectomy/discectomy with Dr. Lomeli on 9/14/2021.  Doing well in regards to preoperative symptoms. Since surgery has had progressive right hip and right buttock pain with some radicular symptoms to her right posterior thigh. She states the pain is moderate to severe. She denies paresthesias and overt weakness. Incisions intact.   Medical, surgical, family, and social history unchanged since prior exam.  Exam:  Constitutional:  Alert, well nourished, NAD.  HEENT: Normocephalic, atraumatic.   Pulm:  Without shortness of breath   CV:  No pitting edema of BLE.      Vital Signs:  BP (!) 142/83   Pulse 88   Resp 18   Ht 5' 4.5\" (1.638 m)   Wt 185 lb (83.9 kg)   BMI 31.26 kg/m      Neurological:  Awake  Alert  Oriented x 3  Motor exam:        IP Q DF PF EHL  R   5  5   5   5    5  L   5  5   5   5    5     Able to spontaneously move L/E bilaterally  Sensation intact throughout all L/E dermatomes  Right SI joint tenderness.     Incisions:  Healing nicely.    A/P: s/p lumbar laminectomy, right SI joint pain. Due to new pain, would recommend diagnostic/therapeutic right SI joint injection. Refill for flexeril sent to her pharmacy. She will follow up on an as needed basis. She verbalized understanding and agreement.  Patient Instructions   - Continue to increase activity as tolerated.  - Follow up as needed.   - Call the clinic at [phone] for increased pain or any other questions and concerns.    SMOKING CESSATION  What's in cigarette smoke? - Cigarette smoke contains over 4,000 chemicals. Nicotine is one of the main ingredients which is an insecticide/herbicide. It is poisonous to our nervous system, increases blood clotting risk, and decreases the body's defenses to fight off infection. Another chemical is Carbon Monoxide is an asphyxiating gas that permanently binds to blood cells and blocks the transport of oxygen. This leads to " tissue death and decreases your metabolism. Tar is a chemical that coats your lungs and trachea which impairs new oxygen coming in and carbon dioxide getting out of your body.   How does smoking impact surgery? - Smoking is particularly hazardous with regards to surgery. Surgery puts stress on the body and a smoker's body is already under strain from these chemicals. Putting the two together, especially for an elective surgery, could be a recipe for disaster. Smoking before and after surgery increases your risk of heart problems, slow wound healing, delayed bone healing, blood clots, wound infection and anesthesia complications.   What are the benefits of quitting? - In 20 minutes your blood pressure will drop back down to normal. In 8 hours the carbon monoxide (a toxic gas) levels in your blood stream will drop by half, and oxygen levels will return to normal. In 48 hours your chance of having a heart attack will have decreased. All nicotine will have left your body. Your sense of taste and smell will return to a normal level. In 72 hours your bronchial tubes will relax, and your energy levels will increase. In 2 weeks your circulation will increase, and it will continue to improve for the next 10 weeks.    Recommendations for elective surgery - Ideally, patients should quit smoking 8 weeks before and at least 2 weeks after elective surgery in order to avoid complications. Simply cutting back on the amount of cigarettes smoked per day does not offer any benefit or decrease the risk of poor wound healing, heart problems, and infection. Smokers should also start taking Vitamin C and B for two weeks before surgery and two weeks after surgery.    Ways to Stop Smokin. Nicotine patches, lozenges, or gum  2. Support Groups  3. Medications (see below)    List of Medications:  1. Varenicline Tartrate (CHANTIX) (may be discontinued by FDA as of 2021)  2. Bupropion HCL (WELLBUTRIN, ZYBAN) - note: make sure  Wellbutrin is for smoking cessation and not other issues   3. Nicotine Patch (NICODERM)   4. Nicotine Inhaler (NICOTROL)   5. Nicotine Gum Nicotine Polacrilex   6. Nicotine Lozenge: Nicotine Polacrilex (COMMIT)   * [place] offers a smoking support group as well!  Please visit: [email or web address]  If you are interested in these, ask about getting a prescription or talk to your primary care doctor about what may be the best way for you to quit.         Lala Fountain, [address] [address]  Tel [phone]  Fax [phone]

## 2022-01-04 NOTE — LETTER
"    1/4/2022         RE: Pily Roche  [address] [facility] [address]        Dear Colleague,    Thank you for referring your patient, Pily Roche, to the [facility]. Please see a copy of my visit note below.    [facility]  Neurosurgery Follow Up:    HPI: 3.5 months s/p L4-5 laminectomy/discectomy with Dr. Lomeli on 9/14/2021.  Doing well in regards to preoperative symptoms. Since surgery has had progressive right hip and right buttock pain with some radicular symptoms to her right posterior thigh. She states the pain is moderate to severe. She denies paresthesias and overt weakness. Incisions intact.   Medical, surgical, family, and social history unchanged since prior exam.  Exam:  Constitutional:  Alert, well nourished, NAD.  HEENT: Normocephalic, atraumatic.   Pulm:  Without shortness of breath   CV:  No pitting edema of BLE.      Vital Signs:  BP (!) 142/83   Pulse 88   Resp 18   Ht 5' 4.5\" (1.638 m)   Wt 185 lb (83.9 kg)   BMI 31.26 kg/m      Neurological:  Awake  Alert  Oriented x 3  Motor exam:        IP Q DF PF EHL  R   5  5   5   5    5  L   5  5   5   5    5     Able to spontaneously move L/E bilaterally  Sensation intact throughout all L/E dermatomes  Right SI joint tenderness.     Incisions:  Healing nicely.    A/P: s/p lumbar laminectomy, right SI joint pain. Due to new pain, would recommend diagnostic/therapeutic right SI joint injection. Refill for flexeril sent to her pharmacy. She will follow up on an as needed basis. She verbalized understanding and agreement.  Patient Instructions   - Continue to increase activity as tolerated.  - Follow up as needed.   - Call the clinic at [phone] for increased pain or any other questions and concerns.    SMOKING CESSATION  What's in cigarette smoke? - Cigarette smoke contains over 4,000 chemicals. Nicotine is one of the main ingredients which is an insecticide/herbicide. It is " poisonous to our nervous system, increases blood clotting risk, and decreases the body's defenses to fight off infection. Another chemical is Carbon Monoxide is an asphyxiating gas that permanently binds to blood cells and blocks the transport of oxygen. This leads to tissue death and decreases your metabolism. Tar is a chemical that coats your lungs and trachea which impairs new oxygen coming in and carbon dioxide getting out of your body.   How does smoking impact surgery? - Smoking is particularly hazardous with regards to surgery. Surgery puts stress on the body and a smoker's body is already under strain from these chemicals. Putting the two together, especially for an elective surgery, could be a recipe for disaster. Smoking before and after surgery increases your risk of heart problems, slow wound healing, delayed bone healing, blood clots, wound infection and anesthesia complications.   What are the benefits of quitting? - In 20 minutes your blood pressure will drop back down to normal. In 8 hours the carbon monoxide (a toxic gas) levels in your blood stream will drop by half, and oxygen levels will return to normal. In 48 hours your chance of having a heart attack will have decreased. All nicotine will have left your body. Your sense of taste and smell will return to a normal level. In 72 hours your bronchial tubes will relax, and your energy levels will increase. In 2 weeks your circulation will increase, and it will continue to improve for the next 10 weeks.    Recommendations for elective surgery - Ideally, patients should quit smoking 8 weeks before and at least 2 weeks after elective surgery in order to avoid complications. Simply cutting back on the amount of cigarettes smoked per day does not offer any benefit or decrease the risk of poor wound healing, heart problems, and infection. Smokers should also start taking Vitamin C and B for two weeks before surgery and two weeks after surgery.    Ways to  Stop Smokin. Nicotine patches, lozenges, or gum  2. Support Groups  3. Medications (see below)    List of Medications:  1. Varenicline Tartrate (CHANTIX) (may be discontinued by FDA as of 2021)  2. Bupropion HCL (WELLBUTRIN, ZYBAN) - note: make sure Wellbutrin is for smoking cessation and not other issues   3. Nicotine Patch (NICODERM)   4. Nicotine Inhaler (NICOTROL)   5. Nicotine Gum Nicotine Polacrilex   6. Nicotine Lozenge: Nicotine Polacrilex (COMMIT)   * [place] offers a smoking support group as well!  Please visit: [email or web address]  If you are interested in these, ask about getting a prescription or talk to your primary care doctor about what may be the best way for you to quit.         Lala Fountain CNP  [address] [address]  Tel [phone]  Fax [phone]         Again, thank you for allowing me to participate in the care of your patient.        Sincerely,        Lala Fountain NP

## 2022-01-04 NOTE — PATIENT INSTRUCTIONS
- Continue to increase activity as tolerated.  - Right SI joint injection ordered. They will contact you to schedule.  - Refill for flexeril sent to your pharmacy.  - Follow up as needed.   - Call the clinic at [phone] for increased pain or any other questions and concerns.    SMOKING CESSATION  What's in cigarette smoke? - Cigarette smoke contains over 4,000 chemicals. Nicotine is one of the main ingredients which is an insecticide/herbicide. It is poisonous to our nervous system, increases blood clotting risk, and decreases the body's defenses to fight off infection. Another chemical is Carbon Monoxide is an asphyxiating gas that permanently binds to blood cells and blocks the transport of oxygen. This leads to tissue death and decreases your metabolism. Tar is a chemical that coats your lungs and trachea which impairs new oxygen coming in and carbon dioxide getting out of your body.   How does smoking impact surgery? - Smoking is particularly hazardous with regards to surgery. Surgery puts stress on the body and a smoker's body is already under strain from these chemicals. Putting the two together, especially for an elective surgery, could be a recipe for disaster. Smoking before and after surgery increases your risk of heart problems, slow wound healing, delayed bone healing, blood clots, wound infection and anesthesia complications.   What are the benefits of quitting? - In 20 minutes your blood pressure will drop back down to normal. In 8 hours the carbon monoxide (a toxic gas) levels in your blood stream will drop by half, and oxygen levels will return to normal. In 48 hours your chance of having a heart attack will have decreased. All nicotine will have left your body. Your sense of taste and smell will return to a normal level. In 72 hours your bronchial tubes will relax, and your energy levels will increase. In 2 weeks your circulation will increase, and it will continue to improve for the next 10  weeks.    Recommendations for elective surgery - Ideally, patients should quit smoking 8 weeks before and at least 2 weeks after elective surgery in order to avoid complications. Simply cutting back on the amount of cigarettes smoked per day does not offer any benefit or decrease the risk of poor wound healing, heart problems, and infection. Smokers should also start taking Vitamin C and B for two weeks before surgery and two weeks after surgery.    Ways to Stop Smokin. Nicotine patches, lozenges, or gum  2. Support Groups  3. Medications (see below)    List of Medications:  1. Varenicline Tartrate (CHANTIX) (may be discontinued by FDA as of 2021)  2. Bupropion HCL (WELLBUTRIN, ZYBAN) - note: make sure Wellbutrin is for smoking cessation and not other issues   3. Nicotine Patch (NICODERM)   4. Nicotine Inhaler (NICOTROL)   5. Nicotine Gum Nicotine Polacrilex   6. Nicotine Lozenge: Nicotine Polacrilex (COMMIT)   * [place] offers a smoking support group as well!  Please visit: [email or web address]  If you are interested in these, ask about getting a prescription or talk to your primary care doctor about what may be the best way for you to quit.

## 2022-01-05 ENCOUNTER — TELEPHONE (OUTPATIENT)
Dept: PALLIATIVE MEDICINE | Facility: CLINIC | Age: 62
End: 2022-01-05
Payer: COMMERCIAL

## 2022-01-05 NOTE — TELEPHONE ENCOUNTER
Screening Questions for Radiology Injections:    Injection to be done at which interventional clinic site? [facility]    Remind [place] Pt's that Covid test is no longer required except for sedation procedure Pt's.    Instruct patient to arrive as directed prior to the scheduled appointment time:    [facility]: 30 minutes before      [place]: 30 minutes before; if IV needed 1 hour before     Procedure ordered by Александр    Procedure ordered? Right SI joint injection      Transforaminal Cervical TRIPP -  [place] does NOT have providers that do these- [facility] and [facility] do have providers that do    As a reminder, receiving steroids can decrease your body's ability to fight infection.   Would you still like to move forward with scheduling the injection?  Yes    What insurance would patient like us to bill for this procedure? [employer]      Worker's comp or MVA (motor vehicle accident) -Any injection DO NOT SCHEDULE and route to Debi Mccrary.      [employer] insurance - For SI joint injections, DO NOT SCHEDULE and route Debi Mccrary.       ALL BCBS, [employer] and [employer]-Route to Debi for review DO NOT SCHEDULE      IF SCHEDULING IN [place] AND NEEDS A PA, IT IS OKAY TO SCHEDULE. [place] HANDLES THEIR OWN PA'S AFTER THE PATIENT IS SCHEDULED. PLEASE SCHEDULE AT LEAST 1 WEEK OUT SO A PA CAN BE OBTAINED.    Any chance of pregnancy? NO   If YES, do NOT schedule and route to RN pool    Is an  needed? No     Patient has a drive home? (mandatory) YES: INFORMED    Is patient taking any blood thinners (That is: Coumadin, Warfarin, Jantoven, Pradaxa Xarelto, Eliquis, Edoxaban, Enoxaparin, Lovenox, Heparin, Arixtra, Fondaparinux, or Fragmin? OR Antiplatelet medication such as Plavix, Brilinta, or Effient? )? No   If hold needed, do NOT schedule, route to RN pool     Is patient taking any aspirin products (includes Excedrin and Fiorinal)? Yes - Pt takes 81mg daily; instructed to hold 0  day(s) prior to procedure.      If more than 325mg/day, OK to schedule; Instruct pt to decrease to less than 325 mg for 7 days AND route to RN pool    For CERVICAL procedures, hold all aspirin products for 6 days.     Tell pt that if aspirin product is not held for 6 days, the procedure WILL BE cancelled.      Does the patient have a bleeding or clotting disorder? No     If YES, okay to schedule AND route to RN nurse pool    For any patients with platelet count <100, must be forwarded to provider    Any allergies to contrast dye, iodine, shellfish, or numbing and steroid medications? No    If YES, add allergy information to appointment notes AND route to the RN pool     If TRIPP and Contrast Dye / Iodine Allergy? DO NOT SCHEDULE, route to RN pool    Allergies: Quetiapine, Seroquel [quetiapine fumarate], Ambien [zolpidem tartrate], Chantix [varenicline], and Zolpidem     Is patient diabetic?  No  If YES, instruct them to bring their glucometer.    Does patient have an active infection or treated for one within the past week? No     Is patient currently taking any antibiotics?  No     For patients on chronic, preventative, or prophylactic antibiotics, procedures may be scheduled.     For patients on antibiotics for active or recent infection:antibiotic course must have been completed for 4 days    Is patient currently taking any steroid medications? (i.e. Prednisone, Medrol)  No     For patients on steroid medications, course must have been completed for 4 days    Is patient actively being treated for cancer or immunocompromised? No  If YES, do NOT schedule and route to RN pool     Are you able to get on and off an exam table with minimal or no assistance? Yes  If NO, do NOT schedule and route to RN pool    Are you able to roll over and lay on your stomach with minimal or no assistance? Yes  If NO, do NOT schedule and route to RN pool     Has the patient had a flu shot or any other vaccinations within 7 days before or  after the procedure.  No     Have you recently had a COVID vaccine or have plans to get it in the near future? No    If yes, explain that for the vaccine to work best they need to:       wait 1 week before and 1 week after getting Vaccine #1    wait 1 week before and 2 weeks after getting Vaccine #2    wait 1 week before and 2 weeks after getting Vaccine BOOSTER    If patient has concerns about the timing, send to RN pool     Does patient have an MRI/CT?  Not Applicable  Check Procedure Scheduling Grid to see if required.      Was the MRI done within the last 3 years?  NA    If yes, where was the MRI done i.e.[facility], [facility], [place] [facility], [facility] etc?       If no, do not schedule and route to RN pool    If MRI was not done at [place], [facility] or [facility] do NOT schedule and route to RN pool.      If pt has an imaging disc, the injection MAY be scheduled but pt has to bring disc to appt.     If they show up without the disc the injection cannot be done    Procedure Specific Instructions:      If celiac plexus block, informed patient NPO for 6 hours and that it is okay to take medications with sips of water, especially blood pressure medications  Not Applicable         If this is for a cervical procedure, informed patient that aspirin needs to be held for 6 days.   Not Applicable      If IV needed:    Do not schedule procedures requiring IV placement in the first appointment of the day or first appointment after lunch. Do NOT schedule at 0745, 0815 or 1245.     Instructed pt to arrive 30 minutes early for IV start if required. (Check Procedure Scheduling Grid)  Not Applicable    Reminders:      If you are started on any steroids or antibiotics between now and your appointment, you must contact us because the procedure may need to be cancelled.  Yes      For all procedures except radiofrequency ablations (RFAs) and spinal cord stimulator (SCS) trials, informed patient:    IV sedation is not provided  for this procedure.  If you feel that an oral anti-anxiety medication is needed, you can discuss this further with your referring provider or primary care provider.  The Pain Clinic provider will discuss specifics of what the procedure includes at your appointment.  Most procedures last 10-20 minutes.  We use numbing medications to help with any discomfort during the procedure.  Not Applicable      For patients 85 or older we recommend having an adult stay w/ them for the remainder of the day.       Does the patient have any questions?  NO  Elizabeth Renteria  [facility]

## 2022-01-06 DIAGNOSIS — N18.2 TYPE 2 DIABETES MELLITUS WITH STAGE 2 CHRONIC KIDNEY DISEASE, WITHOUT LONG-TERM CURRENT USE OF INSULIN (H): ICD-10-CM

## 2022-01-06 DIAGNOSIS — E11.22 TYPE 2 DIABETES MELLITUS WITH STAGE 2 CHRONIC KIDNEY DISEASE, WITHOUT LONG-TERM CURRENT USE OF INSULIN (H): ICD-10-CM

## 2022-01-07 RX ORDER — LISINOPRIL 10 MG/1
10 TABLET ORAL DAILY
Qty: 90 TABLET | Refills: 0 | Status: SHIPPED | OUTPATIENT
Start: 2022-01-07 | End: 2022-05-04

## 2022-01-11 ENCOUNTER — RADIOLOGY INJECTION OFFICE VISIT (OUTPATIENT)
Dept: PALLIATIVE MEDICINE | Facility: CLINIC | Age: 62
End: 2022-01-11
Payer: COMMERCIAL

## 2022-01-11 VITALS — SYSTOLIC BLOOD PRESSURE: 149 MMHG | HEART RATE: 95 BPM | OXYGEN SATURATION: 98 % | DIASTOLIC BLOOD PRESSURE: 89 MMHG

## 2022-01-11 DIAGNOSIS — M53.3 SI (SACROILIAC) JOINT DYSFUNCTION: ICD-10-CM

## 2022-01-11 DIAGNOSIS — M53.3 SACROILIAC JOINT PAIN: ICD-10-CM

## 2022-01-11 PROCEDURE — 27096 INJECT SACROILIAC JOINT: CPT | Mod: RT | Performed by: PAIN MEDICINE

## 2022-01-11 RX ORDER — TRIAMCINOLONE ACETONIDE 40 MG/ML
40 INJECTION, SUSPENSION INTRA-ARTICULAR; INTRAMUSCULAR ONCE
Status: COMPLETED | OUTPATIENT
Start: 2022-01-11 | End: 2022-01-11

## 2022-01-11 RX ADMIN — TRIAMCINOLONE ACETONIDE 40 MG: 40 INJECTION, SUSPENSION INTRA-ARTICULAR; INTRAMUSCULAR at 09:18

## 2022-01-11 ASSESSMENT — PAIN SCALES - GENERAL: PAINLEVEL: SEVERE PAIN (7)

## 2022-01-11 NOTE — NURSING NOTE
Pre-procedure Intake  If YES to any questions or NO to having a   Please complete laminated checklist and leave on the computer keyboard for Provider, verbally inform provider if able.    For SCS Trial, RFA's or any sedation procedure:  Have you been fasting? NA    If yes, for how long? NA    Are you taking any any blood thinners such as Coumadin, Warfarin, Jantoven, Pradaxa Xarelto, Eliquis, Edoxaban, Enoxaparin, Lovenox, Heparin, Arixtra, Fondaparinux, or Fragmin? OR Antiplatelet medication such as Plavix, Brilinta, or Effient?   No     If yes, when did you take your last dose? NA    Do you take aspirin?  Yes -   ASA    If cervical procedure, have you held aspirin for 6 days?   NA    Do you have any allergies to contrast dye, iodine, steroid and/or numbing medications?  NO    Are you currently taking antibiotics or have an active infection?  NO    Have you had a fever/elevated temperature within the past week? NO    Are you currently taking oral steroids? NO    Do you have a ? Yes    Are you pregnant or breastfeeding?  NO    Have you received the COVID-19 vaccine? Yes    If yes, was it your 1st, 2nd or only dose needed? 2nd    Date of most recent vaccine: 07/20/21    Notify provider and RNs if systolic BP >170, diastolic BP >100, P >100 or O2 sats < 90%      Marianna Dale CMA ([facility])

## 2022-01-11 NOTE — PATIENT INSTRUCTIONS
[facility]   Procedure Discharge Instructions    Today you saw:    Dr. Louis Trinidad      You had an:    sacroiliac joint injection         Be cautious when walking. Numbness and/or weakness in the lower extremities may occur for up to 6-8 hours after the procedure due to effect of the local anesthetic    Do not drive for 6 hours. The effect of the local anesthetic could slow your reflexes.     You may resume your regular activities after 24 hours    Avoid strenuous activity for the first 24 hours    You may shower, however avoid swimming, tub baths or hot tubs for 24 hours following your procedure    You may have a mild to moderate increase in pain for several days following the injection.    It may take up to 14 days for the steroid medication to start working although you may feel the effect as early as a few days after the procedure.       You may use ice packs for 10-15 minutes, 3 to 4 times a day at the injection site for comfort    Do not use heat to painful areas for 6 to 8 hours. This will give the local anesthetic time to wear off and prevent you from accidentally burning your skin.     Unless you have been directed to avoid the use of anti-inflammatory medications (NSAIDS), you may use medications such as ibuprofen, Aleve or Tylenol for pain control if needed.     If you were fasting, you may resume your normal diet and medications after the procedure    If you have diabetes, check your blood sugar more frequently than usual as your blood sugar may be higher than normal for 10-14 days following a steroid injection. Contact your doctor who manages your diabetes if your blood sugar is higher than usual    Possible side effects of steroids that you may experience include flushing, elevated blood pressure, increased appetite, mild headaches and restlessness.  All of these symptoms will get better with time.    If you experience any of the following, call the Pain Clinic during  work hours (Mon-Friday 8-4:30 pm) at [phone] or the Provider Line after hours at [phone]:  -Fever over 100 degree F  -Swelling, bleeding, redness, drainage, warmth at the injection site  -Progressive weakness or numbness in your legs or arms  -Loss of bowel or bladder function  -Unusual headache that is not relieved by Tylenol or other pain reliever  -Unusual new onset of pain that is not improving

## 2022-01-11 NOTE — NURSING NOTE
Discharge Information    IV Discontiued Time:  NA    Amount of Fluid Infused:  NA    Discharge Criteria = When patient returns to baseline or as per MD order    Consciousness:  Pt is fully awake    Circulation:  BP +/- 20% of pre-procedure level    Respiration:  Patient is able to breathe deeply    O2 Sat:  Patient is able to maintain O2 Sat >92% on room air    Activity:  Moves 4 extremities on command    Ambulation:  Patient is able to stand and walk or stand and pivot into wheelchair    Dressing:  Clean/dry or No Dressing    Notes:   Discharge instructions and AVS given to patient    Patient meets criteria for discharge?  YES    Admitted to PCU?  No    Responsible adult present to accompany patient home?  Yes    Signature/Title:    kb bruce RN  RN Care Coordinator  [facility] [place]

## 2022-01-11 NOTE — PROGRESS NOTES
Pre procedure Diagnosis: SI joint dysfunction    Post procedure Diagnosis: Same  Procedure performed: Right SI joint injection  Anesthesia: none  Complications: none  Operators: Louis Trinidad MD     Indications:   Pily Roche is a 61 year old female. The patient has a history of right buttocks pain in setting of prior lumbar surgery . Other conservative treatments prior to injection include meds/pt/surgery.injection.    Options/alternatives, benefits and risks were discussed with the patient including bleeding, infection, tissue trauma, exposure to radiation, reaction to medications including seizure, nerve injury, weakness, and numbness.  Questions were answered to her satisfaction and she agrees to proceed. Voluntary informed consent was obtained and signed.     Vitals were reviewed: Yes  Allergies were reviewed:  Yes   Medications were reviewed:  Yes   Pre-procedure pain score: 7/10    Procedure:  After obtaining signed informed consent, the patient was brought into the procedure suite and was placed in a prone position on the procedure table.   A Pause for the Cause was performed.  The patient was prepped and draped in the usual sterile fashion.     After identifying the right SI joint, the C-arm was rotated obliquely to obtain the best view of the inferior angle of the joint.  Then 4 ml of Lidocaine 1%  was used to anesthetize the skin at a skin entry site coaxial with the fluoroscopy beam at this location.  A 22 gauge 3.5 inch needle was advanced under intermittent fluoroscopy until it was felt to enter the SI joint.  Aspiration was negative.    A total of 1ml of Omnipaque-300 was injected, confirming appropriate position, with spread into the intraarticular space, with no intravascular uptake noted.  9ml of Omnipaque was wasted. Location was verified in lateral view.    2 ml of 0.5% bupivacaine with 40mg of Kenalog was injected.  The needle was removed.     Hemostasis was achieved, the area was  cleaned, and bandaids were placed when appropriate.  The patient tolerated the procedure well, and was taken to the recovery room.  Images were saved to PACS.    Post-procedure pain score: 6/10  Follow-up includes:   -f/u phone call in one week  -f/u with referring Physician     Louis Trinidad MD  [facility]

## 2022-01-31 ENCOUNTER — TELEPHONE (OUTPATIENT)
Dept: NEUROSURGERY | Facility: OTHER | Age: 62
End: 2022-01-31
Payer: COMMERCIAL

## 2022-01-31 NOTE — TELEPHONE ENCOUNTER
Pily called for Lala to report that the injection is not working. Please reach her at [phone].

## 2022-02-01 NOTE — TELEPHONE ENCOUNTER
Patient called clinic requesting to repeat injection.    Type of injection: 1/11/22 Right SI joint injection    Most recent injection date: 1/11/22    Most recent MRI: 7/29/21    How long did injection provide symptomatic relief: 1-2 days    Current symptoms: Right buttock pain radiating to right hip and down posterior leg(same as before)    Number of injections in last 12 months: 1    Plan: Route to Provider for recommendations      Patient voiced understanding and agreement with plan.     Advised patient to call back with any questions or concerns.

## 2022-02-03 NOTE — TELEPHONE ENCOUNTER
As per Providers, an L5-S3 ablation may benefit patient. Patient is in agreement to proceed. Once order is placed, will notify patient of details.

## 2022-02-04 DIAGNOSIS — M53.3 SACROILIAC JOINT PAIN: Primary | ICD-10-CM

## 2022-02-07 ENCOUNTER — TELEPHONE (OUTPATIENT)
Dept: PALLIATIVE MEDICINE | Facility: CLINIC | Age: 62
End: 2022-02-07
Payer: COMMERCIAL

## 2022-02-07 DIAGNOSIS — M53.3 SACROILIAC JOINT PAIN: Primary | ICD-10-CM

## 2022-02-07 NOTE — TELEPHONE ENCOUNTER
Called patient to schedule Right SI Joint Injection. Pt states this should not be a repeat of the last procedure and it was supposed to be a different injection.    Routing back to referring to review and advise.    Elizabeth BLAKELY    [facility]

## 2022-02-08 NOTE — TELEPHONE ENCOUNTER
This is a sacral lateral branch radiofrequency ablation to treat sacroiliac joint pain. Levels are correct for treatment of sacroiliac joint pain.   Insurance coverage would need to be verified, as I have not yet received approval for a sacral lateral branch radiofrequency ablation.     Aneta Roblero MD  [facility]

## 2022-02-08 NOTE — TELEPHONE ENCOUNTER
Order received for L5-S3 ablation.    Routing to review levels.    Elizabeth BLAKELY    [facility]

## 2022-02-09 NOTE — TELEPHONE ENCOUNTER
Spoke to Shaye at [facility], she states that there is no PA and as long as it is considered medically necessary it is covered.     Reference # for call - [id number]        Routing to schedule lateral MBB #1      Debi GONZALES    [facility]

## 2022-02-09 NOTE — TELEPHONE ENCOUNTER
Screening questions for MBB Injections:    Injection to be done at which interventional clinic site? [facility]     Instruct patient to arrive as directed prior to the scheduled appointment time:    [place] and [place]: 30 minutes before      Procedure ordered by     Procedure ordered? Lumbar Medial Branch Block    What insurance would patient like us to bill for this procedure? [employer]      [employer]: REQUIRES A PA FOR THE FIRST MBB     Worker's comp- Any injection DO NOT SCHEDULE and route to Brandy Higuera.      [employer] insurance - If scheduling an SI joint injection DO NOT SCHEDULE and route to Debi Mccrary.       MBBs must be scheduled with elapsed time interval of at least 2 weeks and not more than 6 months between the First MBB and the Second MBB for insurance purposes       [employer] - Any injection besides hip/shoulder/knee joint DO NOT SCHEDULE and route to Debi Mccrary. She will obtain PA and call pt back to schedule procedure or notify pt of denial.       [employer]- PA required for all MBB's      **BCBS- ALL need to be routed to Debi for review if a PA is needed**    IF SCHEDULING IN [place] AND NEEDS A PA, IT IS OKAY TO SCHEDULE. [place] HANDLES THEIR OWN PA'S AFTER THE PATIENT IS SCHEDULED. PLEASE SCHEDULE AT LEAST 1 WEEK OUT SO A PA CAN BE OBTAINED.    Any chance of pregnancy? NO   If YES, do NOT schedule and route to RN pool    Is an  needed? No     Patient has a drive home? (mandatory) Yes     Is patient taking any blood thinners (plavix, coumadin, jantoven, warfarin, heparin, pradaxa or dabigatran )? No    If hold needed, do NOT schedule, route to RN pool     Is patient taking any aspirin products? No     If more than 325mg/day, OK to schedule; Instruct pt to decrease to less than 325 mg for 7 days AND route to RN pool    For CERVICAL procedures, hold all aspirin products for 6 days.     Tell pt that if aspirin product is not held for 6 days, the  procedure WILL BE cancelled.      Does the patient have a bleeding or clotting disorder? No    If YES, okay to schedule AND route to RN nurse pool    **For any patients with platelet count <100, must be forwarded to provider**    Is patient diabetic? NO If YES, have them bring their glucometer.    Does patient have an active infection or treated for one within the past week? No    Is patient currently taking any antibiotics?  No    For patients on chronic, preventative, or prophylactic antibiotics, procedures may be scheduled.     For patients on antibiotics for active or recent infection:antibiotic course must have been completed for 4 days    Is patient currently taking any steroid medications? (i.e. Prednisone, Medrol)  No     For patients on steroid medications, course must have been completed for 4 days    Is patient actively being treated for cancer or immunocompromised? No   If YES, do NOT schedule and route to RN    Are you able to get on and off an exam table with minimal or no assistance? Yes   If NO, do NOT schedule and route to RN    Are you able to roll over and lay on your stomach with minimal or no assistance? Yes   If NO, do NOT schedule and route to RN    Any allergies to contrast dye, iodine, shellfish, or numbing and steroid medications? No  (If so, inform nursing and note in scheduling comments.)    Allergies: Quetiapine, Seroquel [quetiapine fumarate], Ambien [zolpidem tartrate], Chantix [varenicline], and Zolpidem     Does patient have an MRI/CT?  Not Applicable  Check Procedure Scheduling Grid to see if required.      Was the MRI done within the last 3 years?  NA    If yes, where was the MRI done i.e.[facility], [facility], [facility], [facility] etc?       If no, do not schedule and route to nursing    If MRI was not done at [place], [facility] or [facility] do NOT schedule and route to nursing.      If pt has an imaging disc, the injection MAY be scheduled but pt has to bring disc to  appt.     If they show up without the disc the injection cannot be done      Medial Branch Block Pre-Procedure Instructions    It is okay to take long acting pain medications (if you are on them) the day of the procedure but try not to take any short acting medications unless absolutely necessary.  ok  Long acting meds would include: Gabapentin (Neurontin), MS Contin, Oxycontin        Short acting meds would include:  Percocet, Oxycodone, Vicodin, Ibuprofen     The day of the procedure, you should try to do things that provoke your pain, since the injection is being done to see if it will relieve your pain . YES: ok     If your pain level is a 4 out of 10 or less on the day of the procedu re, please call [phone] to reschedule.  YES: ok     Reminders:      If you are started on any steroids or antibiotics between now and your appointment, you must contact us because it may affect our ability to perform your procedure ok      Instructed pt to arrive 30 minutes early for IV start if required. (Check Procedure Scheduling Grid) SCOTT       If this is for a cervical MBB aspirin needs to be held for 6 days.  NO       Do not schedule procedures requiring IV placement in the first appointment of the day or first appointment after lunch. Do NOT schedule at 0745, 0815 or 1245.  ok      For patients 85 or older we recommend having an adult stay w/ them for the remainder of the day.      Does the patient have any questions? no

## 2022-02-14 DIAGNOSIS — K21.9 LPRD (LARYNGOPHARYNGEAL REFLUX DISEASE): ICD-10-CM

## 2022-02-15 NOTE — PROGRESS NOTES
[facility] Dermatology Note  Encounter Date: Feb 16, 2022  Office Visit     Dermatology Problem List:  1. Intradermal melanocytic nevus  - right mid cheek  -  bx 2/24/21     FHx: Unknown skin cancer in grandfather and father  ____________________________________________    Assessment & Plan:    #  Intradermal melanocytic nevus, Right mid cheek, bx 2/24/21  - No further intervention needed.  - Consider Hydroquinone 4% cream in the future if dyspigmentation is bothersome to patient      Procedures Performed:   None    Follow-up: prn for new or changing lesions    Staff and Scribe:     Scribe Disclosure:   I, Carlos Enrique Mesa, am serving as a scribe to document services personally performed by Jodee Fernandez PA-C, based on data collection and the provider's statements to me.    Provider Disclosure:   The documentation recorded by the scribe accurately reflects the services I personally performed and the decisions made by me.    All risks, benefits and alternatives were discussed with patient.  Patient is in agreement and understands the assessment and plan.  All questions were answered.    Jodee Fernandez PA-C, MPAS  [facility] [place]: Phone: [phone], Fax: [phone]  [facility]: Phone: [phone],  Fax: [phone]  [facility]: Phone: [phone], Fax: [phone]  ____________________________________________    CC: Derm Problem (Intradermal melanocytic nevus- R mid cheek)    HPI:  Ms. Pily Roche is a(n) 61 year old female who presents today as a return patient for a spot check.     Last seen on 2/24/21 for a skin check. At that time, a bx was performed on the mid right cheek. Results showed that it was an intradermal melanocytic nevus.     Today, patient has an area of concern on the right mid cheek. The site of bx was healing well.     Patient is otherwise feeling well,  without additional concerns.    Labs:  NA    Physical Exam:  Vitals: There were no vitals taken for this visit.  SKIN: Focused examination of right mid cheek was performed.  - Right mid cheek - well healed biopsy scar with mild, faint, hyperpigmentation   - Dilated vessels scattered on the cheeks  - No other lesions of concern on areas examined.     Medications:  Current Outpatient Medications   Medication     albuterol (VENTOLIN HFA) 108 (90 Base) MCG/ACT inhaler     alcohol swab prep pads     blood glucose (NO BRAND SPECIFIED) test strip     blood glucose monitoring (NO BRAND SPECIFIED) meter device kit     buPROPion (WELLBUTRIN SR) 150 MG 12 hr tablet     busPIRone (BUSPAR) 10 MG tablet     cyclobenzaprine (FLEXERIL) 5 MG tablet     fluticasone (FLONASE) 50 MCG/ACT nasal spray     gabapentin (NEURONTIN) 300 MG capsule     lisinopril (ZESTRIL) 10 MG tablet     loratadine (CLARITIN) 10 MG tablet     metFORMIN (GLUCOPHAGE-XR) 500 MG 24 hr tablet     methocarbamol (ROBAXIN) 500 MG tablet     mometasone-formoterol (DULERA) 100-5 MCG/ACT inhaler     omeprazole (PRILOSEC) 20 MG DR capsule     order for DME     simvastatin (ZOCOR) 40 MG tablet     traZODone (DESYREL) 100 MG tablet     Current Facility-Administered Medications   Medication     lidocaine 1% with EPINEPHrine 1:100,000 injection 3 mL      Past Medical History:   Patient Active Problem List   Diagnosis     Insomnia     Chronic low back pain     Elevated liver enzymes     Moderate major depression (H)     Disorder of bursae and tendons in shoulder region     Chronic bronchitis, unspecified chronic bronchitis type (H)     Advance care planning     Type 2 diabetes, HbA1c goal < 7% (H)     Hyperlipidemia LDL goal <100     Impingement syndrome, shoulder     Rotator cuff tear     AC separation     Tobacco use disorder     Anxiety     Chronic shoulder pain, unspecified laterality     CKD (chronic kidney disease) stage 2, GFR 60-89 ml/min     Macular degeneration      "Type II diabetes mellitus with stage 2 chronic kidney disease (H)     Non morbid obesity due to excess calories     Menopausal symptoms     Chronic bilateral low back pain without sciatica     Chronic pain syndrome     Type 2 diabetes mellitus without retinopathy (H)     H/O alcohol abuse     GUDELIA (generalized anxiety disorder)     Elevated blood pressure reading without diagnosis of hypertension     Ingrown toenail without infection     LPRD (laryngopharyngeal reflux disease)     Major depressive disorder, recurrent episode, moderate (H)     History of methamphetamine abuse (H)     Lumbar radiculopathy     Past Medical History:   Diagnosis Date     Advanced directives, counseling/discussion 2/26/2013    Patient does not have an Advance/Health Care Directive (HCD), given \"What is Advance Care Planning?\" flyer.  Maureen Iglesias February 26, 2013      Arthritis      Complication of anesthesia     slow to wake up, low BP after surgery     Disorders of bursae and tendons in shoulder region, unspecified 6/8/2010     Dysfunctional uterine bleeding      History of substance abuse (H) 2006    cocaine, completed rehab     Hyperlipidemia      Insomnia      Lyme disease 1990     Pain in shoulder 3/16/2010     Seasonal allergies      Tobacco use disorders      Type 2 diabetes, HbA1c goal < 7% (H) 7/18/2013          "

## 2022-02-16 ENCOUNTER — OFFICE VISIT (OUTPATIENT)
Dept: DERMATOLOGY | Facility: CLINIC | Age: 62
End: 2022-02-16
Payer: COMMERCIAL

## 2022-02-16 DIAGNOSIS — L90.5 SCAR: Primary | ICD-10-CM

## 2022-02-16 PROCEDURE — 99212 OFFICE O/P EST SF 10 MIN: CPT | Performed by: PHYSICIAN ASSISTANT

## 2022-02-16 RX ORDER — LORATADINE 10 MG/1
TABLET ORAL
Qty: 90 TABLET | Refills: 0 | Status: SHIPPED | OUTPATIENT
Start: 2022-02-16 | End: 2022-05-10

## 2022-02-16 NOTE — LETTER
2/16/2022         RE: Pily Roche  [id number] [place] [address]        Dear Colleague,    Thank you for referring your patient, Pily Roche, to the [facility]. Please see a copy of my visit note below.    [facility] Dermatology Note  Encounter Date: Feb 16, 2022  Office Visit     Dermatology Problem List:  1. Intradermal melanocytic nevus  - right mid cheek  -  bx 2/24/21     FHx: Unknown skin cancer in grandfather and father  ____________________________________________    Assessment & Plan:    #  Intradermal melanocytic nevus, Right mid cheek, bx 2/24/21  - No further intervention needed.  - Consider Hydroquinone 4% cream in the future if dyspigmentation is bothersome to patient      Procedures Performed:   None    Follow-up: prn for new or changing lesions    Staff and Scribe:     Scribe Disclosure:   I, Carlos Enrique Mesa, am serving as a scribe to document services personally performed by Jodee Fernandez PA-C, based on data collection and the provider's statements to me.    Provider Disclosure:   The documentation recorded by the scribe accurately reflects the services I personally performed and the decisions made by me.    All risks, benefits and alternatives were discussed with patient.  Patient is in agreement and understands the assessment and plan.  All questions were answered.    Jodee Fernandez PA-C, [facility]  [facility]: Phone: [phone], Fax: [phone]  [facility]: Phone: [phone],  Fax: [phone]  [facility]: Phone: [phone], Fax: [phone]  ____________________________________________    CC: Derm Problem (Intradermal melanocytic nevus- R mid cheek)    HPI:  Ms. Pily Roche is a(n) 61 year old female who presents today as a return patient for a spot check.     Last seen on 2/24/21 for a skin check. At  that time, a bx was performed on the mid right cheek. Results showed that it was an intradermal melanocytic nevus.     Today, patient has an area of concern on the right mid cheek. The site of bx was healing well.     Patient is otherwise feeling well, without additional concerns.    Labs:  NA    Physical Exam:  Vitals: There were no vitals taken for this visit.  SKIN: Focused examination of right mid cheek was performed.  - Right mid cheek - well healed biopsy scar with mild, faint, hyperpigmentation   - Dilated vessels scattered on the cheeks  - No other lesions of concern on areas examined.     Medications:  Current Outpatient Medications   Medication     albuterol (VENTOLIN HFA) 108 (90 Base) MCG/ACT inhaler     alcohol swab prep pads     blood glucose (NO BRAND SPECIFIED) test strip     blood glucose monitoring (NO BRAND SPECIFIED) meter device kit     buPROPion (WELLBUTRIN SR) 150 MG 12 hr tablet     busPIRone (BUSPAR) 10 MG tablet     cyclobenzaprine (FLEXERIL) 5 MG tablet     fluticasone (FLONASE) 50 MCG/ACT nasal spray     gabapentin (NEURONTIN) 300 MG capsule     lisinopril (ZESTRIL) 10 MG tablet     loratadine (CLARITIN) 10 MG tablet     metFORMIN (GLUCOPHAGE-XR) 500 MG 24 hr tablet     methocarbamol (ROBAXIN) 500 MG tablet     mometasone-formoterol (DULERA) 100-5 MCG/ACT inhaler     omeprazole (PRILOSEC) 20 MG DR capsule     order for DME     simvastatin (ZOCOR) 40 MG tablet     traZODone (DESYREL) 100 MG tablet     Current Facility-Administered Medications   Medication     lidocaine 1% with EPINEPHrine 1:100,000 injection 3 mL      Past Medical History:   Patient Active Problem List   Diagnosis     Insomnia     Chronic low back pain     Elevated liver enzymes     Moderate major depression (H)     Disorder of bursae and tendons in shoulder region     Chronic bronchitis, unspecified chronic bronchitis type (H)     Advance care planning     Type 2 diabetes, HbA1c goal < 7% (H)     Hyperlipidemia LDL goal  "<100     Impingement syndrome, shoulder     Rotator cuff tear     AC separation     Tobacco use disorder     Anxiety     Chronic shoulder pain, unspecified laterality     CKD (chronic kidney disease) stage 2, GFR 60-89 ml/min     Macular degeneration     Type II diabetes mellitus with stage 2 chronic kidney disease (H)     Non morbid obesity due to excess calories     Menopausal symptoms     Chronic bilateral low back pain without sciatica     Chronic pain syndrome     Type 2 diabetes mellitus without retinopathy (H)     H/O alcohol abuse     GUDELIA (generalized anxiety disorder)     Elevated blood pressure reading without diagnosis of hypertension     Ingrown toenail without infection     LPRD (laryngopharyngeal reflux disease)     Major depressive disorder, recurrent episode, moderate (H)     History of methamphetamine abuse (H)     Lumbar radiculopathy     Past Medical History:   Diagnosis Date     Advanced directives, counseling/discussion 2/26/2013    Patient does not have an Advance/Health Care Directive (HCD), given \"What is Advance Care Planning?\" flyer.  Maureen Iglesias February 26, 2013      Arthritis      Complication of anesthesia     slow to wake up, low BP after surgery     Disorders of bursae and tendons in shoulder region, unspecified 6/8/2010     Dysfunctional uterine bleeding      History of substance abuse (H) 2006    cocaine, completed rehab     Hyperlipidemia      Insomnia      Lyme disease 1990     Pain in shoulder 3/16/2010     Seasonal allergies      Tobacco use disorders      Type 2 diabetes, HbA1c goal < 7% (H) 7/18/2013              Again, thank you for allowing me to participate in the care of your patient.        Sincerely,        Jodee Fernandez PA-C    "

## 2022-02-16 NOTE — NURSING NOTE
Pily Roche's goals for this visit include:   Chief Complaint   Patient presents with     Derm Problem     Intradermal melanocytic nevus- R mid cheek     She requests these members of her care team be copied on today's visit information:     PCP: Kath Fierro    Referring Provider:  Referred Self, MD  No address on file    There were no vitals taken for this visit.    Do you need any medication refills at today's visit? No  Kath Alvarez, CMA on 2/16/2022 at 7:26 AM

## 2022-02-18 ENCOUNTER — RADIOLOGY INJECTION OFFICE VISIT (OUTPATIENT)
Dept: PALLIATIVE MEDICINE | Facility: CLINIC | Age: 62
End: 2022-02-18
Payer: COMMERCIAL

## 2022-02-18 VITALS — SYSTOLIC BLOOD PRESSURE: 145 MMHG | OXYGEN SATURATION: 94 % | HEART RATE: 104 BPM | DIASTOLIC BLOOD PRESSURE: 92 MMHG

## 2022-02-18 DIAGNOSIS — M53.3 SACROILIAC JOINT PAIN: ICD-10-CM

## 2022-02-18 DIAGNOSIS — M53.3 SI (SACROILIAC) JOINT DYSFUNCTION: ICD-10-CM

## 2022-02-18 DIAGNOSIS — M47.816 SPONDYLOSIS WITHOUT MYELOPATHY OR RADICULOPATHY, LUMBAR REGION: ICD-10-CM

## 2022-02-18 PROCEDURE — 64451 NJX AA&/STRD NRV NRVTG SI JT: CPT | Performed by: PAIN MEDICINE

## 2022-02-18 ASSESSMENT — PAIN SCALES - GENERAL: PAINLEVEL: MODERATE PAIN (5)

## 2022-02-18 NOTE — NURSING NOTE
Pre-procedure Intake  If YES to any questions or NO to having a   Please complete laminated checklist and leave on the computer keyboard for Provider, verbally inform provider if able.    For SCS Trial, RFA's or any sedation procedure:  Have you been fasting? NA    If yes, for how long?     Are you taking any any blood thinners such as Coumadin, Warfarin, Jantoven, Pradaxa Xarelto, Eliquis, Edoxaban, Enoxaparin, Lovenox, Heparin, Arixtra, Fondaparinux, or Fragmin? OR Antiplatelet medication such as Plavix, Brilinta, or Effient?   No     If yes, when did you take your last dose?     Do you take aspirin?  Yes    If cervical procedure, have you held aspirin for 6 days?   NA    Do you have any allergies to contrast dye, iodine, steroid and/or numbing medications?  NO    Are you currently taking antibiotics or have an active infection?  NO    Have you had a fever/elevated temperature within the past week? NO    Are you currently taking oral steroids? NO    Do you have a ? Yes    Are you pregnant or breastfeeding?  NO    Have you received the COVID-19 vaccine? Yes    If yes, was it your 1st, 2nd or only dose needed? 2nd dose     Date of most recent vaccine: 7/20/2021    Notify provider and RNs if systolic BP >170, diastolic BP >100, P >100 or O2 sats < 90%

## 2022-02-18 NOTE — NURSING NOTE
Discharge Information    IV Discontiued Time:  NA    Amount of Fluid Infused:  NA    Discharge Criteria = When patient returns to baseline or as per MD order    Consciousness:  Pt is fully awake    Circulation:  BP +/- 20% of pre-procedure level    Respiration:  Patient is able to breathe deeply    O2 Sat:  Patient is able to maintain O2 Sat >92% on room air    Activity:  Moves 4 extremities on command    Ambulation:  Patient is able to stand and walk or stand and pivot into wheelchair    Dressing:  Clean/dry or No Dressing    Notes:   Discharge instructions and AVS given to patient    Patient meets criteria for discharge?  YES    Admitted to PCU?  No    Responsible adult present to accompany patient home?  Yes    Signature/Title:    kb bruce RN  RN Care Coordinator  [facility] [place]

## 2022-02-18 NOTE — PATIENT INSTRUCTIONS
[facility]   Lateral Block Discharge Instructions      Your procedure was performed by: Dr. Louis Trinidad       Medications used include:  Lidocaine  Bupivicaine      You will need to complete the Pain Scale Log form and return it to us as soon as possible.  Once we have received the form, we will review it and call you to determine the next steps.     The form can be faxed to [phone] or mailed to:   [facility]   [id number]   [place] [address]      You may resume your regular medications    You may resume your regular activities    Be cautious with walking as numbness and/or weakness in the lower extremities may occur for up to 6-8 hours due to the effects of the anesthetic.    Avoid driving for 6 hours. The local anesthetic could slow your reflexes.     You may shower, however no swimming or tub baths or hot tubs for 24 hours following your procedure.    Your pain will return after the numbing medications have worn off.  You may use your current pain medications as needed.    Unless you have been directed to avoid the use of anti-inflammatory medications (NSAIDS), you may use medications such as ibuprofen, Aleve or Tylenol for pain control if needed.  Some people find it helpful to alternate ibuprofen and Tylenol every 3 hours for a couple of days.    You may use ice packs 10-15 minutes three to four times a day at the injection site for comfort.     Do not use heat to painful areas for 6 to 8 hours. This will give the local anesthetic time to wear off and prevent you from accidentally burning your skin.     If you experience any of the following, call the Pain Clinic during work hours (Monday through Friday 8 am-4:30 pm) at [phone] or the Provider Line after hours at [phone]:  -Fever over 100 degree F  -Swelling, bleeding, redness, drainage, warmth at the injection site  -Progressive weakness or numbness in your legs or arms  -If  lumbar, call if you have a loss of bowel or bladder function  -If cervical, call if you have any unusual headache that is not relieved by Tylenol  -Unusual new onset of pain that is not improving

## 2022-02-24 NOTE — PROGRESS NOTES
Pre procedure Diagnosis: Sacroiliac joint dysfunction    Post procedure Diagnosis: Same  Procedure performed: right lateral branch block L5, S1,2,3  Indication:  Diagnostic   Anesthesia: none  Complications: none  Operators: Louis Trinidad MD   Indications:   Pily Roche is a 61 year old female. The patient has a history of right upper buttocks pain.  Exam shows +++ SI joint TTP and they have tried conservative treatment including PHYSICAL THERAPY  and meds.    Options/alternatives, benefits and risks were discussed with the patient including but not limited to bleeding, infection, tissue trauma, exposure to radiation, reaction to medications, spinal cord injury, weakness, numbness and paralysis.  Questions were answered to her satisfaction and she agrees to proceed. Voluntary informed consent was obtained and signed.     Vitals were reviewed: Yes  Allergies were reviewed:  Yes   Medications were reviewed:  Yes   Pre-procedure pain score: 8/10    Procedure:  After obtaining signed informed consent, the patient was brought into the procedure suite and was placed in a prone position on the procedure table.   A Pause for the Cause was performed.  The patient was prepped and draped in the usual sterile fashion.     Under AP fluoroscopic guidance the SA, S1,2,3 foramen were identified. The C-arm was rotated to the oblique view to afford optimal visualization the pedicles.  Lidocaine 1% was used to anesthetize the skin at each level.  Under intermittent fluoroscopy, 25G 3.5inch spinal needles were positioned lateral to the sacral foramen and at the SA for L5 medial branch. The needle positions were verified and optimized from the AP view.      Bupivacaine 0.25% 1 ml was injected at each location.  The needles were removed.      Hemostasis was achieved, the area was cleaned, and bandaids were placed when appropriate.  The patient tolerated the procedure well, and was taken to the recovery room.    Images were  saved to PACS.     The patient will continue to monitor progress, and they were given a pain diary to complete at home.  They will either fax or mail this back to us or bring it to their next appointment. We will determine the treatment plan after we review the diary.      Post-procedure pain score: 0/10  Follow-up includes:   -f/u phone call in one week  -will await diary for further planning.    Louis Trinidad MD  [facility]

## 2022-03-01 ENCOUNTER — MYC MEDICAL ADVICE (OUTPATIENT)
Dept: PALLIATIVE MEDICINE | Facility: CLINIC | Age: 62
End: 2022-03-01
Payer: COMMERCIAL

## 2022-03-01 NOTE — TELEPHONE ENCOUNTER
Pt had a rightLumbar  medial branch block # 1 on 2/18/22.  The post medial branch block form was received.    According to pain diary pt would not like to proceed with medial branch block #2.    Max relief from block is:    At rest:                 100%  Right facet load:  100%  Normal activity:    100%    Physical therapy: no     Last done in?:  n/a.         Routed to Debi to review. Does pt had enough relief insurance-wise to proceed to medial branch block #2?  If so please schedule.      Marjan Howard, RN-BSN  [facility]

## 2022-03-04 NOTE — TELEPHONE ENCOUNTER
Mariajose to schedule LMBB #2      Debi GONZALES    [facility]

## 2022-03-15 ENCOUNTER — RADIOLOGY INJECTION OFFICE VISIT (OUTPATIENT)
Dept: PALLIATIVE MEDICINE | Facility: CLINIC | Age: 62
End: 2022-03-15
Payer: COMMERCIAL

## 2022-03-15 VITALS — HEART RATE: 112 BPM | DIASTOLIC BLOOD PRESSURE: 80 MMHG | SYSTOLIC BLOOD PRESSURE: 128 MMHG

## 2022-03-15 DIAGNOSIS — M53.3 SI (SACROILIAC) JOINT DYSFUNCTION: ICD-10-CM

## 2022-03-15 PROCEDURE — 64451 NJX AA&/STRD NRV NRVTG SI JT: CPT | Performed by: PAIN MEDICINE

## 2022-03-15 ASSESSMENT — PAIN SCALES - GENERAL
PAINLEVEL: MODERATE PAIN (5)
PAINLEVEL: MILD PAIN (2)

## 2022-03-15 NOTE — PATIENT INSTRUCTIONS
[facility]   Medial Branch Block Discharge Instructions      Your procedure was performed by: Dr. Louis Trinidad       Medications used include:  Lidocaine  Bupivicaine      You will need to complete the Pain Scale Log form and return it to us as soon as possible.  Once we have received the form, we will review it and call you to determine the next steps.     The form can be faxed to [phone] or mailed to:   [facility]   [address]   [place] [address]      You may resume your regular medications    If you were holding your blood thinning medication, please restart taking it: N/A    You may resume your regular activities    Be cautious with walking as numbness and/or weakness in the lower extremities may occur for up to 6-8 hours due to the effects of the anesthetic.    Avoid driving for 6 hours. The local anesthetic could slow your reflexes.     You may shower, however no swimming or tub baths or hot tubs for 24 hours following your procedure.    Your pain will return after the numbing medications have worn off.  You may use your current pain medications as needed.    Unless you have been directed to avoid the use of anti-inflammatory medications (NSAIDS), you may use medications such as ibuprofen, Aleve or Tylenol for pain control if needed.  Some people find it helpful to alternate ibuprofen and Tylenol every 3 hours for a couple of days.    You may use ice packs 10-15 minutes three to four times a day at the injection site for comfort.     Do not use heat to painful areas for 6 to 8 hours. This will give the local anesthetic time to wear off and prevent you from accidentally burning your skin.     If you experience any of the following, call the Pain Clinic during work hours (Monday through Friday 8 am-4:30 pm) at [phone] or the Provider Line after hours at [phone]:  -Fever over 100 degree F  -Swelling, bleeding, redness,  drainage, warmth at the injection site  -Progressive weakness or numbness in your legs   -If lumbar, call if you have a loss of bowel or bladder function  -If cervical, call if you have any unusual headache that is not relieved by Tylenol  -Unusual new onset of pain that is not improving

## 2022-03-15 NOTE — NURSING NOTE
Discharge Information    IV Discontiued Time:  NA    Amount of Fluid Infused:  NA    Discharge Criteria = When patient returns to baseline or as per MD order    Consciousness:  Pt is fully awake    Circulation:  BP +/- 20% of pre-procedure level    Respiration:  Patient is able to breathe deeply    O2 Sat:  Patient is able to maintain O2 Sat >92% on room air    Activity:  Moves 4 extremities on command    Ambulation:  Patient is able to stand and walk or stand and pivot into wheelchair    Dressing:  Clean/dry or No Dressing    Notes:   Discharge instructions and AVS given to patient    Patient meets criteria for discharge?  YES    Admitted to PCU?  No    Responsible adult present to accompany patient home?  Yes    Signature/Title:    Paula Rodriguez RN  RN Care Coordinator  [facility]  '

## 2022-03-15 NOTE — PROGRESS NOTES
Pre procedure Diagnosis: Sacroiliac joint dysfunction    Post procedure Diagnosis: Same  Procedure performed: right lateral branch block L5, S1,2,3  Indication:  Diagnostic   Anesthesia: none  Complications: none  Operators: Louis Trinidad MD   Indications:   Pily Roche is a 61 year old female. The patient has a history of right upper buttocks pain.  Exam shows +++ SI joint TTP and they have tried conservative treatment including PHYSICAL THERAPY  and meds.    Options/alternatives, benefits and risks were discussed with the patient including but not limited to bleeding, infection, tissue trauma, exposure to radiation, reaction to medications, spinal cord injury, weakness, numbness and paralysis.  Questions were answered to her satisfaction and she agrees to proceed. Voluntary informed consent was obtained and signed.     Vitals were reviewed: Yes  Allergies were reviewed:  Yes   Medications were reviewed:  Yes   Pre-procedure pain score: 8/10    Procedure:  After obtaining signed informed consent, the patient was brought into the procedure suite and was placed in a prone position on the procedure table.   A Pause for the Cause was performed.  The patient was prepped and draped in the usual sterile fashion.     Under AP fluoroscopic guidance the SA, S1,2,3 foramen were identified. The C-arm was rotated to the oblique view to afford optimal visualization the pedicles.  Lidocaine 1% was used to anesthetize the skin at each level.  Under intermittent fluoroscopy, 25G 3.5inch spinal needles were positioned lateral to the sacral foramen and at the SA for L5 medial branch. The needle positions were verified and optimized from the AP view.      Bupivacaine 0.25% 1 ml was injected at each location.  The needles were removed.      Hemostasis was achieved, the area was cleaned, and bandaids were placed when appropriate.  The patient tolerated the procedure well, and was taken to the recovery room.    Images were  saved to PACS.     The patient will continue to monitor progress, and they were given a pain diary to complete at home.  They will either fax or mail this back to us or bring it to their next appointment. We will determine the treatment plan after we review the diary.      Post-procedure pain score: 1/10  Follow-up includes:   -f/u phone call in one week  -will await diary for further planning.    Louis Trinidad MD  [facility]

## 2022-03-15 NOTE — NURSING NOTE
Pre-procedure Intake  If YES to any questions or NO to having a   Please complete laminated checklist and leave on the computer keyboard for Provider, verbally inform provider if able.    For SCS Trial, RFA's or any sedation procedure:  Have you been fasting? NA    If yes, for how long?     Are you taking any any blood thinners such as Coumadin, Warfarin, Jantoven, Pradaxa Xarelto, Eliquis, Edoxaban, Enoxaparin, Lovenox, Heparin, Arixtra, Fondaparinux, or Fragmin? OR Antiplatelet medication such as Plavix, Brilinta, or Effient?   No     If yes, when did you take your last dose?     Do you take aspirin?  No    If cervical procedure, have you held aspirin for 6 days?       Do you have any allergies to contrast dye, iodine, steroid and/or numbing medications?  NO    Are you currently taking antibiotics or have an active infection?  NO    Have you had a fever/elevated temperature within the past week? NO    Are you currently taking oral steroids? NO    Do you have a ? Yes    Are you pregnant or breastfeeding?  NO    Have you received the COVID-19 vaccine? Yes    If yes, was it your 1st, 2nd or only dose needed? 2nd dose     Date of most recent vaccine: 7/20/21    Notify provider and RNs if systolic BP >170, diastolic BP >100, P >100 or O2 sats < 90%

## 2022-04-06 DIAGNOSIS — F41.1 GAD (GENERALIZED ANXIETY DISORDER): ICD-10-CM

## 2022-04-08 RX ORDER — BUSPIRONE HYDROCHLORIDE 10 MG/1
20 TABLET ORAL 2 TIMES DAILY
Qty: 360 TABLET | Refills: 0 | Status: SHIPPED | OUTPATIENT
Start: 2022-04-08 | End: 2022-06-01

## 2022-05-10 ENCOUNTER — TELEPHONE (OUTPATIENT)
Dept: FAMILY MEDICINE | Facility: CLINIC | Age: 62
End: 2022-05-10
Payer: COMMERCIAL

## 2022-05-10 DIAGNOSIS — E11.22 TYPE 2 DIABETES MELLITUS WITH STAGE 2 CHRONIC KIDNEY DISEASE, WITHOUT LONG-TERM CURRENT USE OF INSULIN (H): ICD-10-CM

## 2022-05-10 DIAGNOSIS — N18.2 TYPE 2 DIABETES MELLITUS WITH STAGE 2 CHRONIC KIDNEY DISEASE, WITHOUT LONG-TERM CURRENT USE OF INSULIN (H): ICD-10-CM

## 2022-05-10 DIAGNOSIS — F33.1 MAJOR DEPRESSIVE DISORDER, RECURRENT EPISODE, MODERATE (H): ICD-10-CM

## 2022-05-10 DIAGNOSIS — J44.89 CHRONIC BRONCHITIS WITH COPD (CHRONIC OBSTRUCTIVE PULMONARY DISEASE) (H): ICD-10-CM

## 2022-05-10 DIAGNOSIS — F17.200 TOBACCO USE DISORDER: ICD-10-CM

## 2022-05-10 DIAGNOSIS — M25.519 CHRONIC SHOULDER PAIN, UNSPECIFIED LATERALITY: ICD-10-CM

## 2022-05-10 DIAGNOSIS — E78.5 HYPERLIPIDEMIA LDL GOAL <100: ICD-10-CM

## 2022-05-10 DIAGNOSIS — K21.9 LPRD (LARYNGOPHARYNGEAL REFLUX DISEASE): ICD-10-CM

## 2022-05-10 DIAGNOSIS — G89.29 CHRONIC SHOULDER PAIN, UNSPECIFIED LATERALITY: ICD-10-CM

## 2022-05-10 NOTE — TELEPHONE ENCOUNTER
Patient was last seen 9/8/21 for pre op for back surgery.    See recent note from PCP:    Please call patient, due for recheck for further refills, please assist patient in scheduling appt (med check with non-fasting labs prior).  Zeynep refill given, orders placed.         Kath Fierro PA-C      I called patient, she wants to schedule a routine visit to get her meds filled.   Says this will be her last visit with Kath as she is in the process of moving permanently to [place] and has a clinic and provider determined but not scheduled yet, wants to see Kath one more time.    Advised we can send refills to get her by until seen.   [facility], June 1st is the first opening.   She does not want to do pre-visit labs, wants to do any needed labs at the visit (lives in [place]).    I sha'd the meds she says she is running out of.   I advised her of the ones recently sent to pharmacy.    Routed to Kath Fierro to address refills due to sheer number for RN to address via protocol.    Bailey Mata, CINTHIA  [facility]

## 2022-05-10 NOTE — TELEPHONE ENCOUNTER
Reason for Call:  Other appointment    Detailed comments: Patient would like to see primary prior to first available in June    Phone Number Patient can be reached at: Cell number on file:    Telephone Information:   Mobile [phone]       Best Time: any    Can we leave a detailed message on this number? YES    Call taken on 5/10/2022 at 3:00 PM by Marjan Vernon

## 2022-05-11 RX ORDER — GABAPENTIN 300 MG/1
CAPSULE ORAL
Qty: 810 CAPSULE | Refills: 0 | Status: SHIPPED | OUTPATIENT
Start: 2022-05-11 | End: 2022-06-01

## 2022-05-11 RX ORDER — LORATADINE 10 MG/1
1 TABLET ORAL DAILY
Qty: 90 TABLET | Refills: 0 | Status: SHIPPED | OUTPATIENT
Start: 2022-05-11

## 2022-05-11 RX ORDER — ALBUTEROL SULFATE 90 UG/1
AEROSOL, METERED RESPIRATORY (INHALATION)
Qty: 36 G | Refills: 0 | Status: SHIPPED | OUTPATIENT
Start: 2022-05-11

## 2022-05-11 RX ORDER — SIMVASTATIN 40 MG
40 TABLET ORAL AT BEDTIME
Qty: 90 TABLET | Refills: 0 | Status: SHIPPED | OUTPATIENT
Start: 2022-05-11 | End: 2022-06-01

## 2022-05-11 RX ORDER — TRAZODONE HYDROCHLORIDE 100 MG/1
200 TABLET ORAL AT BEDTIME
Qty: 180 TABLET | Refills: 0 | Status: SHIPPED | OUTPATIENT
Start: 2022-05-11 | End: 2022-06-01

## 2022-05-11 RX ORDER — METFORMIN HCL 500 MG
2000 TABLET, EXTENDED RELEASE 24 HR ORAL
Qty: 360 TABLET | Refills: 0 | Status: SHIPPED | OUTPATIENT
Start: 2022-05-11 | End: 2022-06-01

## 2022-05-11 RX ORDER — BUPROPION HYDROCHLORIDE 150 MG/1
150 TABLET, EXTENDED RELEASE ORAL 2 TIMES DAILY
Qty: 180 TABLET | Refills: 0 | Status: SHIPPED | OUTPATIENT
Start: 2022-05-11 | End: 2022-06-01

## 2022-05-11 NOTE — TELEPHONE ENCOUNTER
Please call Pily,     [place].  I recommend she come in fasting for her appt with me (no eating or drinking for 8 hours), orders placed.    She can take all her normal morning meds with water (I wouldn't expect her sugar to go low with metformin alone).     Kath Fierro PA-C

## 2022-05-12 NOTE — TELEPHONE ENCOUNTER
Called pt and informed of refills and to come in fasting for upcoming appointment, pt understanding and agreed. Closing encounter.     Fatmata Velez MA on 5/12/2022 at 8:38 AM

## 2022-06-01 ENCOUNTER — OFFICE VISIT (OUTPATIENT)
Dept: FAMILY MEDICINE | Facility: CLINIC | Age: 62
End: 2022-06-01
Payer: COMMERCIAL

## 2022-06-01 ENCOUNTER — PATIENT OUTREACH (OUTPATIENT)
Dept: CARE COORDINATION | Facility: CLINIC | Age: 62
End: 2022-06-01

## 2022-06-01 VITALS
HEART RATE: 105 BPM | WEIGHT: 188 LBS | OXYGEN SATURATION: 94 % | HEIGHT: 65 IN | TEMPERATURE: 97.9 F | DIASTOLIC BLOOD PRESSURE: 51 MMHG | SYSTOLIC BLOOD PRESSURE: 134 MMHG | BODY MASS INDEX: 31.32 KG/M2

## 2022-06-01 DIAGNOSIS — M25.519 CHRONIC SHOULDER PAIN, UNSPECIFIED LATERALITY: ICD-10-CM

## 2022-06-01 DIAGNOSIS — G89.29 CHRONIC SHOULDER PAIN, UNSPECIFIED LATERALITY: ICD-10-CM

## 2022-06-01 DIAGNOSIS — M54.16 LUMBAR RADICULOPATHY: ICD-10-CM

## 2022-06-01 DIAGNOSIS — Z12.11 SCREEN FOR COLON CANCER: ICD-10-CM

## 2022-06-01 DIAGNOSIS — N18.2 TYPE 2 DIABETES MELLITUS WITH STAGE 2 CHRONIC KIDNEY DISEASE, WITHOUT LONG-TERM CURRENT USE OF INSULIN (H): Primary | ICD-10-CM

## 2022-06-01 DIAGNOSIS — Z98.890 S/P LUMBAR LAMINECTOMY: ICD-10-CM

## 2022-06-01 DIAGNOSIS — F17.200 TOBACCO USE DISORDER: ICD-10-CM

## 2022-06-01 DIAGNOSIS — E11.22 TYPE 2 DIABETES MELLITUS WITH STAGE 2 CHRONIC KIDNEY DISEASE, WITHOUT LONG-TERM CURRENT USE OF INSULIN (H): Primary | ICD-10-CM

## 2022-06-01 DIAGNOSIS — E78.5 HYPERLIPIDEMIA LDL GOAL <100: ICD-10-CM

## 2022-06-01 DIAGNOSIS — F41.1 GAD (GENERALIZED ANXIETY DISORDER): ICD-10-CM

## 2022-06-01 DIAGNOSIS — J44.89 CHRONIC BRONCHITIS WITH COPD (CHRONIC OBSTRUCTIVE PULMONARY DISEASE) (H): ICD-10-CM

## 2022-06-01 DIAGNOSIS — F33.1 MAJOR DEPRESSIVE DISORDER, RECURRENT EPISODE, MODERATE (H): ICD-10-CM

## 2022-06-01 LAB
ALBUMIN SERPL-MCNC: 3.9 G/DL (ref 3.4–5)
ALP SERPL-CCNC: 101 U/L (ref 40–150)
ALT SERPL W P-5'-P-CCNC: 31 U/L (ref 0–50)
ANION GAP SERPL CALCULATED.3IONS-SCNC: 6 MMOL/L (ref 3–14)
AST SERPL W P-5'-P-CCNC: 30 U/L (ref 0–45)
BILIRUB SERPL-MCNC: 0.3 MG/DL (ref 0.2–1.3)
BUN SERPL-MCNC: 15 MG/DL (ref 7–30)
CALCIUM SERPL-MCNC: 9.5 MG/DL (ref 8.5–10.1)
CHLORIDE BLD-SCNC: 109 MMOL/L (ref 94–109)
CHOLEST SERPL-MCNC: 151 MG/DL
CO2 SERPL-SCNC: 27 MMOL/L (ref 20–32)
CREAT SERPL-MCNC: 0.74 MG/DL (ref 0.52–1.04)
CREAT UR-MCNC: 192 MG/DL
FASTING STATUS PATIENT QL REPORTED: YES
GFR SERPL CREATININE-BSD FRML MDRD: >90 ML/MIN/1.73M2
GLUCOSE BLD-MCNC: 129 MG/DL (ref 70–99)
HBA1C MFR BLD: 6.6 % (ref 0–5.6)
HDLC SERPL-MCNC: 45 MG/DL
LDLC SERPL CALC-MCNC: 82 MG/DL
MICROALBUMIN UR-MCNC: 16 MG/L
MICROALBUMIN/CREAT UR: 8.33 MG/G CR (ref 0–25)
NONHDLC SERPL-MCNC: 106 MG/DL
POTASSIUM BLD-SCNC: 5.1 MMOL/L (ref 3.4–5.3)
PROT SERPL-MCNC: 7.4 G/DL (ref 6.8–8.8)
SODIUM SERPL-SCNC: 142 MMOL/L (ref 133–144)
TRIGL SERPL-MCNC: 120 MG/DL

## 2022-06-01 PROCEDURE — 80053 COMPREHEN METABOLIC PANEL: CPT | Performed by: PHYSICIAN ASSISTANT

## 2022-06-01 PROCEDURE — 99214 OFFICE O/P EST MOD 30 MIN: CPT | Performed by: PHYSICIAN ASSISTANT

## 2022-06-01 PROCEDURE — 80061 LIPID PANEL: CPT | Performed by: PHYSICIAN ASSISTANT

## 2022-06-01 PROCEDURE — 83036 HEMOGLOBIN GLYCOSYLATED A1C: CPT | Performed by: PHYSICIAN ASSISTANT

## 2022-06-01 PROCEDURE — 36415 COLL VENOUS BLD VENIPUNCTURE: CPT | Performed by: PHYSICIAN ASSISTANT

## 2022-06-01 PROCEDURE — 82043 UR ALBUMIN QUANTITATIVE: CPT | Performed by: PHYSICIAN ASSISTANT

## 2022-06-01 RX ORDER — GABAPENTIN 300 MG/1
CAPSULE ORAL
Qty: 810 CAPSULE | Refills: 1 | Status: SHIPPED | OUTPATIENT
Start: 2022-06-01

## 2022-06-01 RX ORDER — METHOCARBAMOL 500 MG/1
TABLET, FILM COATED ORAL
Qty: 180 TABLET | Refills: 1 | Status: SHIPPED | OUTPATIENT
Start: 2022-06-01 | End: 2023-01-13

## 2022-06-01 RX ORDER — BUSPIRONE HYDROCHLORIDE 10 MG/1
20 TABLET ORAL 2 TIMES DAILY
Qty: 360 TABLET | Refills: 1 | Status: SHIPPED | OUTPATIENT
Start: 2022-06-01

## 2022-06-01 RX ORDER — SIMVASTATIN 40 MG
40 TABLET ORAL AT BEDTIME
Qty: 90 TABLET | Refills: 1 | Status: SHIPPED | OUTPATIENT
Start: 2022-06-01 | End: 2022-12-01

## 2022-06-01 RX ORDER — BUPROPION HYDROCHLORIDE 150 MG/1
150 TABLET, EXTENDED RELEASE ORAL 2 TIMES DAILY
Qty: 180 TABLET | Refills: 1 | Status: SHIPPED | OUTPATIENT
Start: 2022-06-01

## 2022-06-01 RX ORDER — METFORMIN HCL 500 MG
2000 TABLET, EXTENDED RELEASE 24 HR ORAL
Qty: 360 TABLET | Refills: 1 | Status: SHIPPED | OUTPATIENT
Start: 2022-06-01 | End: 2022-08-12

## 2022-06-01 RX ORDER — TRAZODONE HYDROCHLORIDE 100 MG/1
200 TABLET ORAL AT BEDTIME
Qty: 180 TABLET | Refills: 1 | Status: SHIPPED | OUTPATIENT
Start: 2022-06-01

## 2022-06-01 RX ORDER — CYCLOBENZAPRINE HCL 5 MG
5-10 TABLET ORAL 3 TIMES DAILY PRN
Qty: 30 TABLET | Refills: 4 | Status: SHIPPED | OUTPATIENT
Start: 2022-06-01

## 2022-06-01 RX ORDER — LISINOPRIL 10 MG/1
10 TABLET ORAL DAILY
Qty: 90 TABLET | Refills: 1 | Status: SHIPPED | OUTPATIENT
Start: 2022-06-01 | End: 2022-12-01

## 2022-06-01 ASSESSMENT — ANXIETY QUESTIONNAIRES
GAD7 TOTAL SCORE: 11
7. FEELING AFRAID AS IF SOMETHING AWFUL MIGHT HAPPEN: SEVERAL DAYS
8. IF YOU CHECKED OFF ANY PROBLEMS, HOW DIFFICULT HAVE THESE MADE IT FOR YOU TO DO YOUR WORK, TAKE CARE OF THINGS AT HOME, OR GET ALONG WITH OTHER PEOPLE?: SOMEWHAT DIFFICULT
7. FEELING AFRAID AS IF SOMETHING AWFUL MIGHT HAPPEN: SEVERAL DAYS
4. TROUBLE RELAXING: MORE THAN HALF THE DAYS
2. NOT BEING ABLE TO STOP OR CONTROL WORRYING: SEVERAL DAYS
3. WORRYING TOO MUCH ABOUT DIFFERENT THINGS: MORE THAN HALF THE DAYS
5. BEING SO RESTLESS THAT IT IS HARD TO SIT STILL: SEVERAL DAYS
GAD7 TOTAL SCORE: 11
1. FEELING NERVOUS, ANXIOUS, OR ON EDGE: MORE THAN HALF THE DAYS
GAD7 TOTAL SCORE: 11
6. BECOMING EASILY ANNOYED OR IRRITABLE: MORE THAN HALF THE DAYS

## 2022-06-01 ASSESSMENT — PATIENT HEALTH QUESTIONNAIRE - PHQ9
10. IF YOU CHECKED OFF ANY PROBLEMS, HOW DIFFICULT HAVE THESE PROBLEMS MADE IT FOR YOU TO DO YOUR WORK, TAKE CARE OF THINGS AT HOME, OR GET ALONG WITH OTHER PEOPLE: SOMEWHAT DIFFICULT
SUM OF ALL RESPONSES TO PHQ QUESTIONS 1-9: 10
SUM OF ALL RESPONSES TO PHQ QUESTIONS 1-9: 10

## 2022-06-01 NOTE — LETTER
[facility]  [address] [place] [address]      June 2, 2022    Pily Roche  [address] [place] [address]    Dear Pily,    I am a clinic community health worker who works with Kath Fierro PA-C with the [facility]. I wanted to introduce myself and provide you with my contact information to call me with any support and/or resources.  Below is a description of clinic care coordination and how we can further assist you.      The clinic care coordination team is made up of a registered nurse, , financial resource worker and community health worker who understand the health care system. The goal of clinic care coordination is to help you manage your health and improve access to the health care system. Our team works alongside your provider to assist you in determining your health and social needs. We can help you obtain health care and community resources, providing you with necessary information and education. We can work with you through any barriers and develop a care plan that helps coordinate and strengthen the communication between you and your care team.    Please feel free to contact me with any questions or concerns care coordination and what we can offer.      We are focused on providing you with the highest-quality healthcare experience possible.    Sincerely,       Daina Dejesus  Community Health Worker  [facility] Coordination   Office: [phone]

## 2022-06-01 NOTE — LETTER
June 1, 2022      Pily TORRES [facility]  [address] [place] [address]          Dear Pily,     Your labs look great!   Please follow-up if you have any questions or concerns.       Resulted Orders   Albumin Random Urine Quantitative with Creat Ratio   Result Value Ref Range    Creatinine Urine mg/dL 192 mg/dL    Albumin Urine mg/L 16 mg/L    Albumin Urine mg/g Cr 8.33 0.00 - 25.00 mg/g Cr   Lipid panel reflex to direct LDL Fasting   Result Value Ref Range    Cholesterol 151 <200 mg/dL    Triglycerides 120 <150 mg/dL    Direct Measure HDL 45 (L) >=50 mg/dL    LDL Cholesterol Calculated 82 <=100 mg/dL    Non HDL Cholesterol 106 <130 mg/dL    Patient Fasting > 8hrs? Yes     Narrative    Cholesterol  Desirable:  <200 mg/dL    Triglycerides  Normal:  Less than 150 mg/dL  Borderline High:  150-199 mg/dL  High:  200-499 mg/dL  Very High:  Greater than or equal to 500 mg/dL    Direct Measure HDL  Female:  Greater than or equal to 50 mg/dL   Male:  Greater than or equal to 40 mg/dL    LDL Cholesterol  Desirable:  <100mg/dL  Above Desirable:  100-129 mg/dL   Borderline High:  130-159 mg/dL   High:  160-189 mg/dL   Very High:  >= 190 mg/dL    Non HDL Cholesterol  Desirable:  130 mg/dL  Above Desirable:  130-159 mg/dL  Borderline High:  160-189 mg/dL  High:  190-219 mg/dL  Very High:  Greater than or equal to 220 mg/dL   Hemoglobin A1c   Result Value Ref Range    Hemoglobin A1C 6.6 (H) 0.0 - 5.6 %      Comment:      Normal <5.7%   Prediabetes 5.7-6.4%    Diabetes 6.5% or higher     Note: Adopted from ADA consensus guidelines.   Comprehensive metabolic panel (BMP + Alb, Alk Phos, ALT, AST, Total. Bili, TP)   Result Value Ref Range    Sodium 142 133 - 144 mmol/L    Potassium 5.1 3.4 - 5.3 mmol/L    Chloride 109 94 - 109 mmol/L    Carbon Dioxide (CO2) 27 20 - 32 mmol/L    Anion Gap 6 3 - 14 mmol/L    Urea Nitrogen 15 7 - 30 mg/dL    Creatinine 0.74 0.52 - 1.04 mg/dL    Calcium 9.5 8.5 - 10.1 mg/dL    Glucose 129 (H) 70 -  99 mg/dL    Alkaline Phosphatase 101 40 - 150 U/L    AST 30 0 - 45 U/L    ALT 31 0 - 50 U/L    Protein Total 7.4 6.8 - 8.8 g/dL    Albumin 3.9 3.4 - 5.0 g/dL    Bilirubin Total 0.3 0.2 - 1.3 mg/dL    GFR Estimate >90 >60 mL/min/1.73m2      Comment:      Effective December 21, 2021 eGFRcr in adults is calculated using the 2021 CKD-EPI creatinine equation which includes age and gender (Mahesh et al., NEJM, DOI: 10.1056/IFSIeg2004914)       If you have any questions or concerns, please call the clinic at the number listed above.       Sincerely,      Kath Fierro PA-C/aw

## 2022-06-01 NOTE — PATIENT INSTRUCTIONS
Schedule an annual physical with your new PCP in 4-6 months.  We'll want to recheck non-fasting labs then as well.   You are overdue for a colonoscopy.      Drop off handicap forms to [employer].     I wish you the best Pily, it has been a pleasure caring for you over the years.

## 2022-06-01 NOTE — PROGRESS NOTES
Assessment & Plan     Type 2 diabetes mellitus with stage 2 chronic kidney disease, without long-term current use of insulin (H)  A1C stable.  I recommend eye exam once per year, she will schedule this once she is established in [place].   Continue with medications as is and keep up the great work!  - Albumin Random Urine Quantitative with Creat Ratio  - Lipid panel reflex to direct LDL Fasting  - Hemoglobin A1c  - Comprehensive metabolic panel (BMP + Alb, Alk Phos, ALT, AST, Total. Bili, TP)  - blood glucose (NO BRAND SPECIFIED) test strip; Use to test blood sugar one time daily or as directed. To accompany: Blood Glucose Monitor Brands: per insurance.  - lisinopril (ZESTRIL) 10 MG tablet; Take 1 tablet (10 mg) by mouth daily  - metFORMIN (GLUCOPHAGE XR) 500 MG 24 hr tablet; Take 4 tablets (2,000 mg) by mouth daily (with dinner)  - Primary Care - Care Coordination Referral; Future    Screen for colon cancer  Due for a colonoscopy, she will get this done once she establishes care with new PCP    S/P lumbar laminectomy  She likely will need to establish care with a new pain clinic.    - cyclobenzaprine (FLEXERIL) 5 MG tablet; Take 1-2 tablets (5-10 mg) by mouth 3 times daily as needed for muscle spasms    GUDELIA (generalized anxiety disorder)  Reviewed GUDELIA-7 questionnaire.  Despite very high stress levels (with court proceedings and the move) her score is actually one of the lowest she has had.  Will leave meds as is.   - busPIRone (BUSPAR) 10 MG tablet; Take 2 tablets (20 mg) by mouth 2 times daily    Tobacco use disorder  Continue to work on cessation.   - buPROPion (WELLBUTRIN SR) 150 MG 12 hr tablet; Take 1 tablet (150 mg) by mouth 2 times daily    Major depressive disorder, recurrent episode, moderate (H)  Stable.   - buPROPion (WELLBUTRIN SR) 150 MG 12 hr tablet; Take 1 tablet (150 mg) by mouth 2 times daily  - traZODone (DESYREL) 100 MG tablet; Take 2 tablets (200 mg) by mouth At Bedtime    Chronic shoulder  "pain, unspecified laterality  Will leave medication as is.   - gabapentin (NEURONTIN) 300 MG capsule; TAKE 3 CAPSULES BY MOUTH THREE TIMES DAILY  - methocarbamol (ROBAXIN) 500 MG tablet; TAKE 2 TABLETS BY MOUTH THREE TIMES DAILY AS NEEDED MUSCLE SPASMS.    Chronic bronchitis with COPD (chronic obstructive pulmonary disease) (H)  Doing well.   - mometasone-formoterol (DULERA) 100-5 MCG/ACT inhaler; Inhale 2 puffs into the lungs 2 times daily    Hyperlipidemia LDL goal <100  Stable on statin  - simvastatin (ZOCOR) 40 MG tablet; Take 1 tablet (40 mg) by mouth At Bedtime    Lumbar radiculopathy  Has had lumbar surgery, medical management for now.         30 minutes spent on the date of the encounter doing chart review, history and exam, documentation and further activities per the note       Tobacco Cessation:   reports that she has been smoking cigarettes. She has a 45.00 pack-year smoking history. She has never used smokeless tobacco.  Tobacco Cessation Action Plan: Pharmacotherapies : Zyban/Wellbutrin    BMI:   Estimated body mass index is 31.77 kg/m  as calculated from the following:    Height as of this encounter: 1.638 m (5' 4.5\").    Weight as of this encounter: 85.3 kg (188 lb).   Weight management plan: Discussed healthy diet and exercise guidelines    Depression Screening Follow Up    PHQ 6/1/2022   PHQ-9 Total Score 10   Q9: Thoughts of better off dead/self-harm past 2 weeks Several days   F/U: Thoughts of suicide or self-harm No   F/U: Safety concerns No           Return in about 6 months (around 12/1/2022) for Physical Exam (with new PCP).    Kath Fierro PA-C  [facility] ELVIE Nascimento is a 61 year old who presents for the following health issues     History of Present Illness       Back Pain:  She presents for follow up of back pain. Patient's back pain is a chronic problem.  Location of back pain:  Right lower back, left lower back, right middle of back, left middle of " back, right shoulder, right hip, right side of waist and left side of waist  Description of back pain: sharp, shooting and stabbing  Back pain spreads: right buttocks, left buttocks, right thigh and left thigh    Since patient first noticed back pain, pain is: always present, but gets better and worse  Does back pain interfere with her job:  Not applicable      COPD:  She presents for follow up of COPD.  Overall, COPD symptoms are stable since last visit. She has no fatigue or shortness of breath with exertion and no shortness of breath at rest.She sometimes coughs and does not have change in sputum. No recent fever. She can walk less than a mile without stopping to rest. She can walk 3 or more flights of stairs without resting.The patient has had no ED, urgent care, or hospital admissions because of COPD since the last visit.     Mental Health Follow-up:  Patient presents to follow-up on Depression & Anxiety.Patient's depression since last visit has been:  Medium  The patient is not having other symptoms associated with depression.  Patient's anxiety since last visit has been:  Medium  The patient is not having other symptoms associated with anxiety.  Any significant life events: financial concerns and housing concerns  Patient is not feeling anxious or having panic attacks.  Patient has no concerns about alcohol or drug use.    Diabetes:   She presents for follow up of diabetes.  She is checking home blood glucose a few times a month. She checks blood glucose at bedtime.  Blood glucose is never over 200 and never under 70. When her blood glucose is low, the patient is asymptomatic for confusion, blurred vision, lethargy and reports not feeling dizzy, shaky, or weak.  She has no concerns regarding her diabetes at this time.  She is having excessive thirst. The patient has not had a diabetic eye exam in the last 12 months.         She eats 0-1 servings of fruits and vegetables daily.She consumes 1 sweetened  "beverage(s) daily.She exercises with enough effort to increase her heart rate 10 to 19 minutes per day.  She exercises with enough effort to increase her heart rate 3 or less days per week. She is missing 1 dose(s) of medications per week.    Today's PHQ-9         PHQ-9 Total Score: 10    PHQ-9 Q9 Thoughts of better off dead/self-harm past 2 weeks :   Several days  Thoughts of suicide or self harm: (P) No  Self-harm Plan:     Self-harm Action:       Safety concerns for self or others: (P) No    How difficult have these problems made it for you to do your work, take care of things at home, or get along with other people: Somewhat difficult  Today's GUDELIA-7 Score: 11       Needs handicap sticker for back pain/chronic pain.  Walker over 200 ft can really flare up pain.               Review of Systems   Constitutional, HEENT, cardiovascular, pulmonary, GI, , musculoskeletal, neuro, skin, endocrine and psych systems are negative, except as otherwise noted.      Objective    /51 (BP Location: Right arm, Patient Position: Chair, Cuff Size: Adult Large)   Pulse 105   Temp 97.9  F (36.6  C) (Tympanic)   Ht 1.638 m (5' 4.5\")   Wt 85.3 kg (188 lb)   SpO2 94%   Breastfeeding No   BMI 31.77 kg/m    Body mass index is 31.77 kg/m .  Physical Exam   GENERAL: healthy, alert and no distress  NECK: no adenopathy, no asymmetry, masses, or scars and thyroid normal to palpation  RESP: lungs clear to auscultation - no rales, rhonchi or wheezes  CV: regular rate and rhythm, normal S1 S2, no S3 or S4, no murmur, click or rub, no peripheral edema and peripheral pulses strong  ABDOMEN: soft, nontender, no hepatosplenomegaly, no masses and bowel sounds normal  MS: no gross musculoskeletal defects noted, no edema  Diabetic foot exam: normal DP and PT pulses, no trophic changes or ulcerative lesions and normal sensory exam    Results for orders placed or performed in visit on 06/01/22   Albumin Random Urine Quantitative with Creat " Ratio     Status: None   Result Value Ref Range    Creatinine Urine mg/dL 192 mg/dL    Albumin Urine mg/L 16 mg/L    Albumin Urine mg/g Cr 8.33 0.00 - 25.00 mg/g Cr   Lipid panel reflex to direct LDL Fasting     Status: Abnormal   Result Value Ref Range    Cholesterol 151 <200 mg/dL    Triglycerides 120 <150 mg/dL    Direct Measure HDL 45 (L) >=50 mg/dL    LDL Cholesterol Calculated 82 <=100 mg/dL    Non HDL Cholesterol 106 <130 mg/dL    Patient Fasting > 8hrs? Yes     Narrative    Cholesterol  Desirable:  <200 mg/dL    Triglycerides  Normal:  Less than 150 mg/dL  Borderline High:  150-199 mg/dL  High:  200-499 mg/dL  Very High:  Greater than or equal to 500 mg/dL    Direct Measure HDL  Female:  Greater than or equal to 50 mg/dL   Male:  Greater than or equal to 40 mg/dL    LDL Cholesterol  Desirable:  <100mg/dL  Above Desirable:  100-129 mg/dL   Borderline High:  130-159 mg/dL   High:  160-189 mg/dL   Very High:  >= 190 mg/dL    Non HDL Cholesterol  Desirable:  130 mg/dL  Above Desirable:  130-159 mg/dL  Borderline High:  160-189 mg/dL  High:  190-219 mg/dL  Very High:  Greater than or equal to 220 mg/dL   Hemoglobin A1c     Status: Abnormal   Result Value Ref Range    Hemoglobin A1C 6.6 (H) 0.0 - 5.6 %   Comprehensive metabolic panel (BMP + Alb, Alk Phos, ALT, AST, Total. Bili, TP)     Status: Abnormal   Result Value Ref Range    Sodium 142 133 - 144 mmol/L    Potassium 5.1 3.4 - 5.3 mmol/L    Chloride 109 94 - 109 mmol/L    Carbon Dioxide (CO2) 27 20 - 32 mmol/L    Anion Gap 6 3 - 14 mmol/L    Urea Nitrogen 15 7 - 30 mg/dL    Creatinine 0.74 0.52 - 1.04 mg/dL    Calcium 9.5 8.5 - 10.1 mg/dL    Glucose 129 (H) 70 - 99 mg/dL    Alkaline Phosphatase 101 40 - 150 U/L    AST 30 0 - 45 U/L    ALT 31 0 - 50 U/L    Protein Total 7.4 6.8 - 8.8 g/dL    Albumin 3.9 3.4 - 5.0 g/dL    Bilirubin Total 0.3 0.2 - 1.3 mg/dL    GFR Estimate >90 >60 mL/min/1.73m2

## 2022-06-02 NOTE — PROGRESS NOTES
Clinic Care Coordination Contact  [facility]/Voicemail    Clinical Data: Care Coordination Outreach  Outreach attempted x 2.  Left message on patient's voicemail with call back information and requested return call.  Plan: Covering CHW sent care coordination introduction letter on 6/2/22 via mail. CHW will do no further outreaches at this time.    Fallon ELMORE  Community Health Worker  [facility] Care Coordination  [facility]  [email or web address]   Office: [phone]

## 2022-06-21 ENCOUNTER — ANCILLARY PROCEDURE (OUTPATIENT)
Dept: MAMMOGRAPHY | Facility: CLINIC | Age: 62
End: 2022-06-21
Attending: PHYSICIAN ASSISTANT
Payer: COMMERCIAL

## 2022-06-21 DIAGNOSIS — Z12.31 VISIT FOR SCREENING MAMMOGRAM: ICD-10-CM

## 2022-06-21 PROCEDURE — 77067 SCR MAMMO BI INCL CAD: CPT | Mod: TC | Performed by: RADIOLOGY

## 2023-01-13 ENCOUNTER — TELEPHONE (OUTPATIENT)
Dept: FAMILY MEDICINE | Facility: CLINIC | Age: 63
End: 2023-01-13

## 2023-01-13 DIAGNOSIS — G89.29 CHRONIC SHOULDER PAIN, UNSPECIFIED LATERALITY: ICD-10-CM

## 2023-01-13 DIAGNOSIS — M25.519 CHRONIC SHOULDER PAIN, UNSPECIFIED LATERALITY: ICD-10-CM

## 2023-01-13 RX ORDER — METHOCARBAMOL 500 MG/1
TABLET, FILM COATED ORAL
Qty: 180 TABLET | Refills: 0 | Status: SHIPPED | OUTPATIENT
Start: 2023-01-13

## 2023-01-14 NOTE — TELEPHONE ENCOUNTER
Please call patient, due for recheck for further refills.  She was planning to establish care with a provider up in [facility], please have her request further refills through them.     I hope all is well and send my best wishes please.   Kath Fierro PA-C

## 2023-06-03 DIAGNOSIS — N18.2 TYPE 2 DIABETES MELLITUS WITH STAGE 2 CHRONIC KIDNEY DISEASE, WITHOUT LONG-TERM CURRENT USE OF INSULIN (H): ICD-10-CM

## 2023-06-03 DIAGNOSIS — Z98.890 S/P LUMBAR LAMINECTOMY: ICD-10-CM

## 2023-06-03 DIAGNOSIS — E11.22 TYPE 2 DIABETES MELLITUS WITH STAGE 2 CHRONIC KIDNEY DISEASE, WITHOUT LONG-TERM CURRENT USE OF INSULIN (H): ICD-10-CM

## 2023-06-06 RX ORDER — METFORMIN HCL 500 MG
2000 TABLET, EXTENDED RELEASE 24 HR ORAL
Qty: 360 TABLET | Refills: 0 | OUTPATIENT
Start: 2023-06-06

## 2023-06-06 RX ORDER — CYCLOBENZAPRINE HCL 5 MG
5-10 TABLET ORAL 3 TIMES DAILY PRN
Qty: 30 TABLET | Refills: 3 | OUTPATIENT
Start: 2023-06-06

## 2023-06-14 NOTE — NURSING NOTE
Pily Roche's goals for this visit include:   Chief Complaint   Patient presents with     Skin Check     recheck the 2 spots from the last virtual visit. Also new spot of concern on the back . Hx of SC in father and grandfather- unknown type     She requests these members of her care team be copied on today's visit information:     PCP: Kath Fierro    Referring Provider:  No referring provider defined for this encounter.    There were no vitals taken for this visit.    Do you need any medication refills at today's visit? No    Ruby Olmos LPN       Bcc Keratotic Histology Text: There were aggregates of basaloid cells demonstrating a pink keratotic pattern.

## 2023-08-08 ENCOUNTER — TELEPHONE (OUTPATIENT)
Dept: FAMILY MEDICINE | Facility: CLINIC | Age: 63
End: 2023-08-08
Payer: COMMERCIAL

## 2023-08-08 DIAGNOSIS — F41.1 GAD (GENERALIZED ANXIETY DISORDER): ICD-10-CM

## 2023-08-08 RX ORDER — BUSPIRONE HYDROCHLORIDE 10 MG/1
20 TABLET ORAL 2 TIMES DAILY
Refills: 0 | OUTPATIENT
Start: 2023-08-08

## 2023-08-09 NOTE — TELEPHONE ENCOUNTER
Please call pharmacy as they continue to send refill requests to me and she is no longer under my care.  Please have them stop sending refill requests.   Kath Fierro PA-C

## 2023-08-09 NOTE — TELEPHONE ENCOUNTER
Called pharmacy and spoke with pharmacist. He has removed Kath from the buspirone as this was the only medication her name was still attached to. He is reaching out to her new provider for refills.     Kath Juarez RN

## 2023-08-09 NOTE — TELEPHONE ENCOUNTER
Called pharmacy at [phone], voicemail picked up. Pharmacy may be closed at this time.     Kath Juarez RN

## (undated) DEVICE — TOOL DISSECT MIDAS MR8 14CM MATCH HEAD 3MM MR8-14MH30

## (undated) DEVICE — GLOVE PROTEXIS BLUE W/NEU-THERA 7.0  2D73EB70

## (undated) DEVICE — SYR 50ML CATH TIP W/O NDL 309620

## (undated) DEVICE — SYR 20ML SLIP TIP W/O NDL 302831

## (undated) DEVICE — DRAPE C-ARM 60X42" 1013

## (undated) DEVICE — LINEN TOWEL PACK X5 5464

## (undated) DEVICE — RX SURGIFLO HEMOSTATIC MATRIX W/THROMBIN 8ML 2994

## (undated) DEVICE — SOL WATER IRRIG 1000ML BOTTLE 2F7114

## (undated) DEVICE — DRAPE SHEET REV FOLD 3/4 9349

## (undated) DEVICE — DRSG GAUZE 4X4" 2187

## (undated) DEVICE — GLOVE PROTEXIS BLUE W/NEU-THERA 8.0  2D73EB80

## (undated) DEVICE — ESU GROUND PAD UNIVERSAL W/O CORD

## (undated) DEVICE — POSITIONER PT PRONESAFE HEAD REST W/DERMAPROX INSERT 40599

## (undated) DEVICE — DRAPE MICROSCOPE LEICA 54X150" AR8033650

## (undated) DEVICE — SU MONOCRYL 4-0 PS-2 18" UND Y496G

## (undated) DEVICE — SPONGE SURGIFOAM 50

## (undated) DEVICE — PACK SPINE SM CUSTOM SNE15SSFSK

## (undated) DEVICE — ESU PENCIL W/HOLSTER E2350H

## (undated) DEVICE — DRSG STERI STRIP 1/2X4" R1547

## (undated) DEVICE — DRAPE MAYO STAND 23X54 8337

## (undated) DEVICE — GOWN XLG DISP 9545

## (undated) DEVICE — GLOVE PROTEXIS BLUE W/NEU-THERA 8.5  2D73EB85

## (undated) DEVICE — GLOVE PROTEXIS W/NEU-THERA 7.5  2D73TE75

## (undated) DEVICE — TUBING SUCTION SOFT 20'X3/16" 0036570

## (undated) DEVICE — GLOVE PROTEXIS W/NEU-THERA 8.5  2D73TE85

## (undated) DEVICE — ESU ELEC BLADE 6" COATED/INSULATED E1455-6

## (undated) DEVICE — NDL SPINAL 18GA 3.5" 405184

## (undated) DEVICE — SU VICRYL 0 CT-2 CR 8X18" J727D

## (undated) DEVICE — SU VICRYL 2-0 CT-2 CR 8X18" J726D

## (undated) RX ORDER — OXYCODONE HYDROCHLORIDE 5 MG/1
TABLET ORAL
Status: DISPENSED
Start: 2021-09-14

## (undated) RX ORDER — CEFAZOLIN SODIUM 2 G/100ML
INJECTION, SOLUTION INTRAVENOUS
Status: DISPENSED
Start: 2021-09-14

## (undated) RX ORDER — FENTANYL CITRATE 50 UG/ML
INJECTION, SOLUTION INTRAMUSCULAR; INTRAVENOUS
Status: DISPENSED
Start: 2021-09-14

## (undated) RX ORDER — LIDOCAINE HYDROCHLORIDE 20 MG/ML
INJECTION, SOLUTION EPIDURAL; INFILTRATION; INTRACAUDAL; PERINEURAL
Status: DISPENSED
Start: 2021-09-14

## (undated) RX ORDER — BUPIVACAINE HYDROCHLORIDE AND EPINEPHRINE 5; 5 MG/ML; UG/ML
INJECTION, SOLUTION EPIDURAL; INTRACAUDAL; PERINEURAL
Status: DISPENSED
Start: 2021-09-14

## (undated) RX ORDER — GINSENG 100 MG
CAPSULE ORAL
Status: DISPENSED
Start: 2021-09-14

## (undated) RX ORDER — PROPOFOL 10 MG/ML
INJECTION, EMULSION INTRAVENOUS
Status: DISPENSED
Start: 2021-09-14

## (undated) RX ORDER — NEOSTIGMINE METHYLSULFATE 1 MG/ML
VIAL (ML) INJECTION
Status: DISPENSED
Start: 2021-09-14

## (undated) RX ORDER — GLYCOPYRROLATE 0.2 MG/ML
INJECTION, SOLUTION INTRAMUSCULAR; INTRAVENOUS
Status: DISPENSED
Start: 2021-09-14

## (undated) RX ORDER — FENTANYL CITRATE 0.05 MG/ML
INJECTION, SOLUTION INTRAMUSCULAR; INTRAVENOUS
Status: DISPENSED
Start: 2021-09-14

## (undated) RX ORDER — METHYLPREDNISOLONE ACETATE 40 MG/ML
INJECTION, SUSPENSION INTRA-ARTICULAR; INTRALESIONAL; INTRAMUSCULAR; SOFT TISSUE
Status: DISPENSED
Start: 2021-09-14

## (undated) RX ORDER — ONDANSETRON 2 MG/ML
INJECTION INTRAMUSCULAR; INTRAVENOUS
Status: DISPENSED
Start: 2021-09-14

## (undated) RX ORDER — DEXAMETHASONE SODIUM PHOSPHATE 4 MG/ML
INJECTION, SOLUTION INTRA-ARTICULAR; INTRALESIONAL; INTRAMUSCULAR; INTRAVENOUS; SOFT TISSUE
Status: DISPENSED
Start: 2021-09-14